# Patient Record
Sex: FEMALE | Race: WHITE | Employment: OTHER | URBAN - METROPOLITAN AREA
[De-identification: names, ages, dates, MRNs, and addresses within clinical notes are randomized per-mention and may not be internally consistent; named-entity substitution may affect disease eponyms.]

---

## 2017-01-06 ENCOUNTER — OFFICE VISIT (OUTPATIENT)
Dept: SURGERY | Age: 77
End: 2017-01-06

## 2017-01-06 VITALS
HEIGHT: 62 IN | WEIGHT: 139 LBS | BODY MASS INDEX: 25.58 KG/M2 | SYSTOLIC BLOOD PRESSURE: 160 MMHG | DIASTOLIC BLOOD PRESSURE: 88 MMHG

## 2017-01-06 DIAGNOSIS — D23.72: Primary | ICD-10-CM

## 2017-01-06 PROBLEM — D23.70: Status: ACTIVE | Noted: 2017-01-06

## 2017-01-06 PROCEDURE — 99202 OFFICE O/P NEW SF 15 MIN: CPT | Performed by: SURGERY

## 2017-01-06 ASSESSMENT — ENCOUNTER SYMPTOMS
GASTROINTESTINAL NEGATIVE: 1
RESPIRATORY NEGATIVE: 1
COLOR CHANGE: 1
EYES NEGATIVE: 1
ALLERGIC/IMMUNOLOGIC NEGATIVE: 1

## 2017-01-12 ENCOUNTER — PROCEDURE VISIT (OUTPATIENT)
Dept: SURGERY | Age: 77
End: 2017-01-12

## 2017-01-12 VITALS — SYSTOLIC BLOOD PRESSURE: 122 MMHG | DIASTOLIC BLOOD PRESSURE: 68 MMHG

## 2017-01-12 DIAGNOSIS — L98.9 SKIN LESION: Primary | ICD-10-CM

## 2017-01-12 DIAGNOSIS — D22.9 NEVUS: ICD-10-CM

## 2017-01-12 PROCEDURE — 11401 EXC TR-EXT B9+MARG 0.6-1 CM: CPT | Performed by: SURGERY

## 2017-01-12 PROCEDURE — 1036F TOBACCO NON-USER: CPT | Performed by: SURGERY

## 2017-01-12 PROCEDURE — 12031 INTMD RPR S/A/T/EXT 2.5 CM/<: CPT | Performed by: SURGERY

## 2017-01-23 ENCOUNTER — OFFICE VISIT (OUTPATIENT)
Dept: SURGERY | Age: 77
End: 2017-01-23

## 2017-01-23 VITALS — DIASTOLIC BLOOD PRESSURE: 60 MMHG | SYSTOLIC BLOOD PRESSURE: 112 MMHG

## 2017-01-23 DIAGNOSIS — D22.9 NEVUS: Primary | ICD-10-CM

## 2017-01-23 PROCEDURE — 99024 POSTOP FOLLOW-UP VISIT: CPT | Performed by: SURGERY

## 2017-01-30 RX ORDER — PROPRANOLOL HYDROCHLORIDE 40 MG/1
TABLET ORAL
Qty: 180 TABLET | Refills: 1 | Status: SHIPPED | OUTPATIENT
Start: 2017-01-30 | End: 2017-07-29 | Stop reason: SDUPTHER

## 2017-02-16 ENCOUNTER — TELEPHONE (OUTPATIENT)
Dept: INTERNAL MEDICINE CLINIC | Age: 77
End: 2017-02-16

## 2017-05-01 RX ORDER — ZOLPIDEM TARTRATE 5 MG/1
TABLET ORAL
Qty: 30 TABLET | Refills: 2 | Status: SHIPPED | OUTPATIENT
Start: 2017-05-01 | End: 2017-12-04 | Stop reason: SDUPTHER

## 2017-07-11 ENCOUNTER — OFFICE VISIT (OUTPATIENT)
Dept: INTERNAL MEDICINE CLINIC | Age: 77
End: 2017-07-11

## 2017-07-11 VITALS
HEIGHT: 62 IN | WEIGHT: 136.6 LBS | BODY MASS INDEX: 25.14 KG/M2 | SYSTOLIC BLOOD PRESSURE: 130 MMHG | HEART RATE: 60 BPM | DIASTOLIC BLOOD PRESSURE: 68 MMHG

## 2017-07-11 DIAGNOSIS — G47.62 NOCTURNAL LEG CRAMPS: ICD-10-CM

## 2017-07-11 DIAGNOSIS — E78.2 MIXED HYPERLIPIDEMIA: ICD-10-CM

## 2017-07-11 DIAGNOSIS — I10 ESSENTIAL HYPERTENSION: Primary | ICD-10-CM

## 2017-07-11 LAB
HCT VFR BLD CALC: 37 % (ref 36–48)
HEMOGLOBIN: 12.6 G/DL (ref 12–16)
MCH RBC QN AUTO: 32.8 PG (ref 26–34)
MCHC RBC AUTO-ENTMCNC: 34.1 G/DL (ref 31–36)
MCV RBC AUTO: 96.3 FL (ref 80–100)
PDW BLD-RTO: 14.7 % (ref 12.4–15.4)
PLATELET # BLD: 270 K/UL (ref 135–450)
PMV BLD AUTO: 9.9 FL (ref 5–10.5)
RBC # BLD: 3.84 M/UL (ref 4–5.2)
WBC # BLD: 7.8 K/UL (ref 4–11)

## 2017-07-11 PROCEDURE — 1036F TOBACCO NON-USER: CPT | Performed by: INTERNAL MEDICINE

## 2017-07-11 PROCEDURE — 4040F PNEUMOC VAC/ADMIN/RCVD: CPT | Performed by: INTERNAL MEDICINE

## 2017-07-11 PROCEDURE — G8427 DOCREV CUR MEDS BY ELIG CLIN: HCPCS | Performed by: INTERNAL MEDICINE

## 2017-07-11 PROCEDURE — G8399 PT W/DXA RESULTS DOCUMENT: HCPCS | Performed by: INTERNAL MEDICINE

## 2017-07-11 PROCEDURE — 1090F PRES/ABSN URINE INCON ASSESS: CPT | Performed by: INTERNAL MEDICINE

## 2017-07-11 PROCEDURE — G8420 CALC BMI NORM PARAMETERS: HCPCS | Performed by: INTERNAL MEDICINE

## 2017-07-11 PROCEDURE — 1123F ACP DISCUSS/DSCN MKR DOCD: CPT | Performed by: INTERNAL MEDICINE

## 2017-07-11 PROCEDURE — 99214 OFFICE O/P EST MOD 30 MIN: CPT | Performed by: INTERNAL MEDICINE

## 2017-07-12 LAB
ALBUMIN SERPL-MCNC: 3.9 G/DL (ref 3.4–5)
ANION GAP SERPL CALCULATED.3IONS-SCNC: 15 MMOL/L (ref 3–16)
BUN BLDV-MCNC: 19 MG/DL (ref 7–20)
CALCIUM SERPL-MCNC: 9.7 MG/DL (ref 8.3–10.6)
CHLORIDE BLD-SCNC: 96 MMOL/L (ref 99–110)
CHOLESTEROL, TOTAL: 168 MG/DL (ref 0–199)
CO2: 25 MMOL/L (ref 21–32)
CREAT SERPL-MCNC: 0.8 MG/DL (ref 0.6–1.2)
GFR AFRICAN AMERICAN: >60
GFR NON-AFRICAN AMERICAN: >60
GLUCOSE BLD-MCNC: 96 MG/DL (ref 70–99)
HDLC SERPL-MCNC: 31 MG/DL (ref 40–60)
LDL CHOLESTEROL CALCULATED: 83 MG/DL
PHOSPHORUS: 4.2 MG/DL (ref 2.5–4.9)
POTASSIUM SERPL-SCNC: 4.6 MMOL/L (ref 3.5–5.1)
SODIUM BLD-SCNC: 136 MMOL/L (ref 136–145)
TRIGL SERPL-MCNC: 272 MG/DL (ref 0–150)
TSH REFLEX FT4: 1.8 UIU/ML (ref 0.27–4.2)
VLDLC SERPL CALC-MCNC: 54 MG/DL

## 2017-07-31 RX ORDER — PROPRANOLOL HYDROCHLORIDE 40 MG/1
TABLET ORAL
Qty: 180 TABLET | Refills: 1 | Status: SHIPPED | OUTPATIENT
Start: 2017-07-31 | End: 2018-01-27 | Stop reason: SDUPTHER

## 2017-09-15 ENCOUNTER — TELEPHONE (OUTPATIENT)
Dept: INTERNAL MEDICINE CLINIC | Age: 77
End: 2017-09-15

## 2017-09-15 ENCOUNTER — OFFICE VISIT (OUTPATIENT)
Dept: INTERNAL MEDICINE CLINIC | Age: 77
End: 2017-09-15

## 2017-09-15 ENCOUNTER — HOSPITAL ENCOUNTER (OUTPATIENT)
Dept: OTHER | Age: 77
Discharge: HOME | End: 2017-09-15
Attending: INTERNAL MEDICINE | Admitting: INTERNAL MEDICINE

## 2017-09-15 VITALS
TEMPERATURE: 98.4 F | OXYGEN SATURATION: 94 % | BODY MASS INDEX: 24.11 KG/M2 | HEIGHT: 62 IN | HEART RATE: 60 BPM | SYSTOLIC BLOOD PRESSURE: 130 MMHG | DIASTOLIC BLOOD PRESSURE: 70 MMHG | WEIGHT: 131 LBS

## 2017-09-15 DIAGNOSIS — J18.9 CAP (COMMUNITY ACQUIRED PNEUMONIA): Primary | ICD-10-CM

## 2017-09-15 DIAGNOSIS — R05.9 COUGH: ICD-10-CM

## 2017-09-15 PROCEDURE — 1090F PRES/ABSN URINE INCON ASSESS: CPT | Performed by: INTERNAL MEDICINE

## 2017-09-15 PROCEDURE — G8427 DOCREV CUR MEDS BY ELIG CLIN: HCPCS | Performed by: INTERNAL MEDICINE

## 2017-09-15 PROCEDURE — 1036F TOBACCO NON-USER: CPT | Performed by: INTERNAL MEDICINE

## 2017-09-15 PROCEDURE — 4040F PNEUMOC VAC/ADMIN/RCVD: CPT | Performed by: INTERNAL MEDICINE

## 2017-09-15 PROCEDURE — 99213 OFFICE O/P EST LOW 20 MIN: CPT | Performed by: INTERNAL MEDICINE

## 2017-09-15 PROCEDURE — G8420 CALC BMI NORM PARAMETERS: HCPCS | Performed by: INTERNAL MEDICINE

## 2017-09-15 PROCEDURE — 1123F ACP DISCUSS/DSCN MKR DOCD: CPT | Performed by: INTERNAL MEDICINE

## 2017-09-15 PROCEDURE — G8399 PT W/DXA RESULTS DOCUMENT: HCPCS | Performed by: INTERNAL MEDICINE

## 2017-09-15 RX ORDER — DOXYCYCLINE HYCLATE 100 MG
100 TABLET ORAL 2 TIMES DAILY
Qty: 14 TABLET | Refills: 0 | Status: SHIPPED | OUTPATIENT
Start: 2017-09-15 | End: 2017-09-22

## 2017-09-15 RX ORDER — PREDNISONE 20 MG/1
40 TABLET ORAL DAILY
Qty: 10 TABLET | Refills: 0 | Status: SHIPPED | OUTPATIENT
Start: 2017-09-15 | End: 2017-09-20

## 2017-10-16 ENCOUNTER — OFFICE VISIT (OUTPATIENT)
Dept: INTERNAL MEDICINE CLINIC | Age: 77
End: 2017-10-16

## 2017-10-16 VITALS
DIASTOLIC BLOOD PRESSURE: 76 MMHG | HEIGHT: 62 IN | BODY MASS INDEX: 24.59 KG/M2 | WEIGHT: 133.6 LBS | HEART RATE: 56 BPM | SYSTOLIC BLOOD PRESSURE: 126 MMHG

## 2017-10-16 DIAGNOSIS — R40.20 LOSS OF CONSCIOUSNESS (HCC): Primary | ICD-10-CM

## 2017-10-16 PROCEDURE — G8484 FLU IMMUNIZE NO ADMIN: HCPCS | Performed by: INTERNAL MEDICINE

## 2017-10-16 PROCEDURE — 1123F ACP DISCUSS/DSCN MKR DOCD: CPT | Performed by: INTERNAL MEDICINE

## 2017-10-16 PROCEDURE — G8399 PT W/DXA RESULTS DOCUMENT: HCPCS | Performed by: INTERNAL MEDICINE

## 2017-10-16 PROCEDURE — G8427 DOCREV CUR MEDS BY ELIG CLIN: HCPCS | Performed by: INTERNAL MEDICINE

## 2017-10-16 PROCEDURE — G8420 CALC BMI NORM PARAMETERS: HCPCS | Performed by: INTERNAL MEDICINE

## 2017-10-16 PROCEDURE — 99214 OFFICE O/P EST MOD 30 MIN: CPT | Performed by: INTERNAL MEDICINE

## 2017-10-16 PROCEDURE — 4040F PNEUMOC VAC/ADMIN/RCVD: CPT | Performed by: INTERNAL MEDICINE

## 2017-10-16 PROCEDURE — 1036F TOBACCO NON-USER: CPT | Performed by: INTERNAL MEDICINE

## 2017-10-16 PROCEDURE — 1090F PRES/ABSN URINE INCON ASSESS: CPT | Performed by: INTERNAL MEDICINE

## 2017-10-16 PROCEDURE — 93000 ELECTROCARDIOGRAM COMPLETE: CPT | Performed by: INTERNAL MEDICINE

## 2017-10-16 RX ORDER — SIMVASTATIN 20 MG
TABLET ORAL
Qty: 90 TABLET | Refills: 3 | Status: SHIPPED | OUTPATIENT
Start: 2017-10-16 | End: 2018-10-11 | Stop reason: SDUPTHER

## 2017-10-16 RX ORDER — LISINOPRIL AND HYDROCHLOROTHIAZIDE 25; 20 MG/1; MG/1
TABLET ORAL
Qty: 90 TABLET | Refills: 3 | Status: SHIPPED | OUTPATIENT
Start: 2017-10-16 | End: 2018-10-22 | Stop reason: SDUPTHER

## 2017-10-16 NOTE — PROGRESS NOTES
Subjective:      Patient ID: Wilfred Barney is a 65 y.o. female. CC: loss of consciousness  HPI  The patient is presenting for evaluation for an episode of syncope. Onset: Saturday  Location: head  She was standing still when her symptoms occurred. She had been standing for at least several minutes. She had a prodrome where she felt funny. Associated symptoms: diaphoresis  She was unconscious for 5-10 seconds. She was immediately oriented after the event. She refused evaluation in the ER following the event. She had a prior episode of syncope about 3 years ago. Past Medical History:   Diagnosis Date    Asthma     Breast cyst 1983    Essential tremor     Hyperlipidemia     Ovarian cyst 1979    Pneumonia 2000    Stroke [facility])     mini      Review of Systems  She had incontinence of urine with the event  Neg for nausea or vomiting  Neg for chest pain or palpitations    Objective:   Physical Exam  /76 (Site: Left Arm, Position: Sitting, Cuff Size: Small Adult)   Pulse 56   Ht 5' 2\" (1.575 m)   Wt 133 lb 9.6 oz (60.6 kg)   BMI 24.44 kg/m²    Gen: WN/WD, no acute distress  Head: normocephalic, atraumatic  Eyes: no icterus, no conjunctival erythema. Pupils reactive to light. ENT: Moist mucous membranes, normal nose, the oropharynx is pink and moist  Neck: supple, no masses, no carotid bruits  CV: regular rate and rhythm, no murmurs, rubs, gallops. Peripheral pulses are 2+, symmetric  Resp: Normal effort, clear to auscultation bilaterally  Abd: +Bowel Sounds, soft, non tender to palpation. MSK: no joint swelling, no cyanosis or clubbing  Skin: warm, dry, no rashes visualized  Neuro: Cranial Nerves intact, sensation intact to light touch in all extremities, 5 out of 5 motor function in all extremities, there is no rigidity, there is no dysmetria with finger-nose-finger testing of the bilateral upper extremities  Psych: normal mood and affect. Appropriately oriented.      EKG obtained in the office and interpreted by me reveals NSR, rate 56 bpm, normal intervals, axis, t wave morphology, no ST segment deviation. Assessment:      1. Loss of consciousness (HCC)  EKG 12 Lead    Diff dx: orthostatic syncope, Arrhythmogenic syncope, vasovagal syncope. She thinks she may have been dehydrated that day which would favor orthostatic syncope. Holter monitor will be obtained to eval for arrhythmia. Encouraged fluid intake and calf exercises prior to standing. Plan:      See above.

## 2017-10-17 ENCOUNTER — HOSPITAL ENCOUNTER (OUTPATIENT)
Dept: NEUROLOGY | Age: 77
Discharge: HOME | End: 2017-10-17
Attending: INTERNAL MEDICINE | Admitting: INTERNAL MEDICINE

## 2017-10-17 DIAGNOSIS — R40.20 COMA (HCC): ICD-10-CM

## 2017-10-20 LAB
ACQUISITION DURATION: NORMAL S
AVERAGE HEART RATE: 58 BPM
EKG DIAGNOSIS: NORMAL
FASTEST SUPRAVENTRICULAR RATE: 113 BPM
HOLTER MAX HEART RATE: 85 BPM
HOOKUP DATE: NORMAL
HOOKUP TIME: NORMAL
LONGEST SUPRAVENTRICULAR RATE: 91 BPM
Lab: NORMAL
MAX HEART RATE TIME/DATE: NORMAL
MIN HEART RATE TIME/DATE: NORMAL
MIN HEART RATE: 48 BPM
NUMBER OF FASTEST SUPRAVENTRICULAR BEATS: 3
NUMBER OF LONGEST SUPRAVENTRICULAR BEATS: 23
NUMBER OF QRS COMPLEXES: NORMAL
NUMBER OF SUPRAVENTRICULAR BEATS IN RUNS: 35
NUMBER OF SUPRAVENTRICULAR COUPLETS: 9
NUMBER OF SUPRAVENTRICULAR ECTOPICS: 673
NUMBER OF SUPRAVENTRICULAR ISOLATED BEATS: 619
NUMBER OF SUPRAVENTRICULAR RUNS: 4
NUMBER OF VENTRICULAR BEATS IN RUNS: 0
NUMBER OF VENTRICULAR BIGEMINAL CYCLES: 0
NUMBER OF VENTRICULAR COUPLETS: 0
NUMBER OF VENTRICULAR ECTOPICS: 4
NUMBER OF VENTRICULAR ISOLATED BEATS: 4
NUMBER OF VENTRICULAR RUNS: 0

## 2018-01-05 ENCOUNTER — TELEPHONE (OUTPATIENT)
Dept: INTERNAL MEDICINE CLINIC | Age: 78
End: 2018-01-05

## 2018-01-05 RX ORDER — MV-MN/OM3/DHA/EPA/FISH/LUT/ZEA 250-5-1 MG
1 CAPSULE ORAL DAILY
Status: ON HOLD | COMMUNITY
End: 2020-08-12

## 2018-01-05 NOTE — TELEPHONE ENCOUNTER
Dr. Patricia Reyes: This patient has an upcoming appointment with you for Hyperlipidemia. In planning for that visit I have completed the following pre-visit planning:     Pre-Visit Planning Checklist:  Patient contacted: yes  Verified patient by name and date of birth: yes    Health Maintenance items reviewed:    No pre-visit planning health maintenance topics to review at this time    Labs and procedures pended:     Labs and procedures discussed with patient:   Reminded patient to check with their insurance company about coverage for lab tests and lab location:     Preliminary Medication Reconciliation: was performed. Reminded patient to bring medications to appointment: no    Reminded patient to arrive early: yes    Other notes:     Please complete the med-reconciliation and sign the appropriate labs as soon as possible.       Juve Nevarez  Pre-Services Specialist

## 2018-01-11 ENCOUNTER — OFFICE VISIT (OUTPATIENT)
Dept: INTERNAL MEDICINE CLINIC | Age: 78
End: 2018-01-11

## 2018-01-11 VITALS
HEART RATE: 64 BPM | DIASTOLIC BLOOD PRESSURE: 80 MMHG | WEIGHT: 134.6 LBS | HEIGHT: 62 IN | BODY MASS INDEX: 24.77 KG/M2 | SYSTOLIC BLOOD PRESSURE: 130 MMHG

## 2018-01-11 DIAGNOSIS — R82.90 URINE MALODOR: ICD-10-CM

## 2018-01-11 DIAGNOSIS — F51.01 PRIMARY INSOMNIA: ICD-10-CM

## 2018-01-11 DIAGNOSIS — Z23 NEED FOR INFLUENZA VACCINATION: ICD-10-CM

## 2018-01-11 DIAGNOSIS — E78.2 MIXED HYPERLIPIDEMIA: ICD-10-CM

## 2018-01-11 DIAGNOSIS — I10 ESSENTIAL HYPERTENSION: Primary | ICD-10-CM

## 2018-01-11 PROCEDURE — 90662 IIV NO PRSV INCREASED AG IM: CPT | Performed by: INTERNAL MEDICINE

## 2018-01-11 PROCEDURE — 99214 OFFICE O/P EST MOD 30 MIN: CPT | Performed by: INTERNAL MEDICINE

## 2018-01-11 PROCEDURE — G0008 ADMIN INFLUENZA VIRUS VAC: HCPCS | Performed by: INTERNAL MEDICINE

## 2018-01-11 PROCEDURE — 81002 URINALYSIS NONAUTO W/O SCOPE: CPT | Performed by: INTERNAL MEDICINE

## 2018-01-11 NOTE — PROGRESS NOTES
Chief Complaint   Patient presents with    Hypertension    Hyperlipidemia    Immunizations     would like to discuss influenza vaccine    Other     noticed her urine has an odor to it, no pressure, no burning or pain       HPI:  Shyanne Koch is a 65 y.o. (: 1940) here today for     Routine follow up for hypertension. Feels medications are working well. She does not routinely monitor her blood pressure at home. Routine follow up hyperlipidemia. Feels medication is working well without side effects. Denies myalgia. Chronic Insomnia. Zolpidem 2.5 - 5 mg most nights, provides relief. She feels it is working well without side effects. Patient reports malodor with urination for several months. She denies dysuria, urinary frequency or urgency. Social History   Substance Use Topics    Smoking status: Former Smoker     Packs/day: 1.00     Types: Cigarettes     Quit date: 2013    Smokeless tobacco: Never Used    Alcohol use Yes      Comment: very rare sometimes a glass of wine        ROS:  CV: Neg for chest pain  RESP: neg for dyspnea   GI: Neg for constipation or diarrhea  ENT: + Post nasal drainage for about 1 week, relieved with Advil sinus      OBJECTIVE:    /80   Pulse 64   Ht 5' 2\" (1.575 m)   Wt 134 lb 9.6 oz (61.1 kg)   BMI 24.62 kg/m²   BP Readings from Last 2 Encounters:   18 130/80   10/16/17 126/76     Wt Readings from Last 3 Encounters:   18 134 lb 9.6 oz (61.1 kg)   10/16/17 133 lb 9.6 oz (60.6 kg)   09/15/17 131 lb (59.4 kg)       GEN: WN/WD, NAD  CV: regular rate and rhythm, no murmurs rubs or gallops  Resp: normal effort, clear auscultation bilaterally  No peripheral edema   Abd: soft, nontender to palpation.   ENT: +erythema and drainage of the posterior OP  Neck: supple with no lymph nodes      Lab Results   Component Value Date    CREATININE 0.8 2017    BUN 19 2017     2017    K 4.6 2017    CL 96 (L) 2017    CO2 25

## 2018-01-29 RX ORDER — PROPRANOLOL HYDROCHLORIDE 40 MG/1
TABLET ORAL
Qty: 180 TABLET | Refills: 1 | Status: SHIPPED | OUTPATIENT
Start: 2018-01-29 | End: 2018-07-26 | Stop reason: SDUPTHER

## 2018-02-06 LAB
BILIRUBIN, POC: NORMAL
BLOOD URINE, POC: NORMAL
CLARITY, POC: NORMAL
COLOR, POC: NORMAL
GLUCOSE URINE, POC: NORMAL
KETONES, POC: NORMAL
LEUKOCYTE EST, POC: NORMAL
NITRITE, POC: POSITIVE
PH, POC: 5
PROTEIN, POC: NORMAL
SPECIFIC GRAVITY, POC: 1.02
UROBILINOGEN, POC: NORMAL

## 2018-02-06 RX ORDER — NITROFURANTOIN 25; 75 MG/1; MG/1
100 CAPSULE ORAL 2 TIMES DAILY
Qty: 10 CAPSULE | Refills: 0 | Status: SHIPPED | OUTPATIENT
Start: 2018-02-06 | End: 2018-02-11

## 2018-04-16 ENCOUNTER — TELEPHONE (OUTPATIENT)
Dept: INTERNAL MEDICINE CLINIC | Age: 78
End: 2018-04-16

## 2018-04-16 ENCOUNTER — OFFICE VISIT (OUTPATIENT)
Dept: INTERNAL MEDICINE CLINIC | Age: 78
End: 2018-04-16

## 2018-04-16 VITALS
DIASTOLIC BLOOD PRESSURE: 80 MMHG | OXYGEN SATURATION: 98 % | SYSTOLIC BLOOD PRESSURE: 160 MMHG | HEIGHT: 62 IN | BODY MASS INDEX: 24.77 KG/M2 | WEIGHT: 134.6 LBS | HEART RATE: 56 BPM

## 2018-04-16 DIAGNOSIS — R55 SYNCOPE, UNSPECIFIED SYNCOPE TYPE: Primary | ICD-10-CM

## 2018-04-16 DIAGNOSIS — R25.8 BRADYKINESIA: ICD-10-CM

## 2018-04-16 DIAGNOSIS — R25.1 TREMOR: ICD-10-CM

## 2018-04-16 PROCEDURE — 99214 OFFICE O/P EST MOD 30 MIN: CPT | Performed by: INTERNAL MEDICINE

## 2018-04-16 PROCEDURE — 93000 ELECTROCARDIOGRAM COMPLETE: CPT | Performed by: INTERNAL MEDICINE

## 2018-04-18 ENCOUNTER — OFFICE VISIT (OUTPATIENT)
Dept: NEUROLOGY | Age: 78
End: 2018-04-18

## 2018-04-18 VITALS
HEIGHT: 62 IN | BODY MASS INDEX: 24.84 KG/M2 | DIASTOLIC BLOOD PRESSURE: 87 MMHG | HEART RATE: 73 BPM | WEIGHT: 135 LBS | SYSTOLIC BLOOD PRESSURE: 167 MMHG

## 2018-04-18 DIAGNOSIS — R27.0 ATAXIA: ICD-10-CM

## 2018-04-18 DIAGNOSIS — R55 SYNCOPE AND COLLAPSE: ICD-10-CM

## 2018-04-18 DIAGNOSIS — G20 PARKINSONIAN TREMOR (HCC): Primary | ICD-10-CM

## 2018-04-18 DIAGNOSIS — R25.8 BRADYKINESIA: ICD-10-CM

## 2018-04-18 PROBLEM — G20.C PARKINSONIAN TREMOR: Status: ACTIVE | Noted: 2018-04-18

## 2018-04-18 PROCEDURE — 99215 OFFICE O/P EST HI 40 MIN: CPT | Performed by: PSYCHIATRY & NEUROLOGY

## 2018-04-26 ENCOUNTER — HOSPITAL ENCOUNTER (OUTPATIENT)
Dept: NEUROLOGY | Age: 78
Discharge: HOME | End: 2018-04-26
Admitting: PSYCHIATRY & NEUROLOGY

## 2018-04-26 DIAGNOSIS — R25.8 BRADYKINESIA: ICD-10-CM

## 2018-04-26 DIAGNOSIS — R27.0 ATAXIA: ICD-10-CM

## 2018-04-26 DIAGNOSIS — R55 SYNCOPE AND COLLAPSE: ICD-10-CM

## 2018-04-26 DIAGNOSIS — G20 PARKINSONIAN TREMOR (HCC): ICD-10-CM

## 2018-05-14 ENCOUNTER — OFFICE VISIT (OUTPATIENT)
Dept: INTERNAL MEDICINE CLINIC | Age: 78
End: 2018-05-14

## 2018-05-14 VITALS
DIASTOLIC BLOOD PRESSURE: 78 MMHG | BODY MASS INDEX: 25.03 KG/M2 | SYSTOLIC BLOOD PRESSURE: 158 MMHG | HEIGHT: 62 IN | HEART RATE: 60 BPM | WEIGHT: 136 LBS

## 2018-05-14 DIAGNOSIS — R53.82 CHRONIC FATIGUE: ICD-10-CM

## 2018-05-14 DIAGNOSIS — I10 ESSENTIAL HYPERTENSION: Primary | ICD-10-CM

## 2018-05-14 LAB
ALBUMIN SERPL-MCNC: 4.2 G/DL (ref 3.4–5)
ANION GAP SERPL CALCULATED.3IONS-SCNC: 20 MMOL/L (ref 3–16)
BUN BLDV-MCNC: 16 MG/DL (ref 7–20)
CALCIUM SERPL-MCNC: 9.2 MG/DL (ref 8.3–10.6)
CHLORIDE BLD-SCNC: 93 MMOL/L (ref 99–110)
CO2: 23 MMOL/L (ref 21–32)
CREAT SERPL-MCNC: 0.8 MG/DL (ref 0.6–1.2)
GFR AFRICAN AMERICAN: >60
GFR NON-AFRICAN AMERICAN: >60
GLUCOSE BLD-MCNC: 130 MG/DL (ref 70–99)
HCT VFR BLD CALC: 35.7 % (ref 36–48)
HEMOGLOBIN: 12.4 G/DL (ref 12–16)
MCH RBC QN AUTO: 33.4 PG (ref 26–34)
MCHC RBC AUTO-ENTMCNC: 34.7 G/DL (ref 31–36)
MCV RBC AUTO: 96.5 FL (ref 80–100)
PDW BLD-RTO: 14.6 % (ref 12.4–15.4)
PHOSPHORUS: 3.2 MG/DL (ref 2.5–4.9)
PLATELET # BLD: 252 K/UL (ref 135–450)
PMV BLD AUTO: 9.9 FL (ref 5–10.5)
POTASSIUM SERPL-SCNC: 3.9 MMOL/L (ref 3.5–5.1)
RBC # BLD: 3.69 M/UL (ref 4–5.2)
SODIUM BLD-SCNC: 136 MMOL/L (ref 136–145)
TSH SERPL DL<=0.05 MIU/L-ACNC: 1.82 UIU/ML (ref 0.27–4.2)
VITAMIN B-12: 269 PG/ML (ref 211–911)
WBC # BLD: 9 K/UL (ref 4–11)

## 2018-05-14 PROCEDURE — 99213 OFFICE O/P EST LOW 20 MIN: CPT | Performed by: INTERNAL MEDICINE

## 2018-05-14 RX ORDER — CETIRIZINE HYDROCHLORIDE 10 MG/1
10 TABLET ORAL DAILY
COMMUNITY
End: 2021-04-19

## 2018-05-14 RX ORDER — AMLODIPINE BESYLATE 5 MG/1
5 TABLET ORAL DAILY
Qty: 30 TABLET | Refills: 5 | Status: SHIPPED | OUTPATIENT
Start: 2018-05-14 | End: 2018-11-07 | Stop reason: SDUPTHER

## 2018-05-18 ENCOUNTER — OFFICE VISIT (OUTPATIENT)
Dept: NEUROLOGY | Age: 78
End: 2018-05-18

## 2018-05-18 VITALS
DIASTOLIC BLOOD PRESSURE: 72 MMHG | BODY MASS INDEX: 24.84 KG/M2 | HEIGHT: 62 IN | HEART RATE: 54 BPM | SYSTOLIC BLOOD PRESSURE: 145 MMHG | WEIGHT: 135 LBS

## 2018-05-18 DIAGNOSIS — G20 PARKINSONIAN TREMOR (HCC): ICD-10-CM

## 2018-05-18 DIAGNOSIS — R55 SYNCOPE AND COLLAPSE: Primary | ICD-10-CM

## 2018-05-18 PROCEDURE — 99213 OFFICE O/P EST LOW 20 MIN: CPT | Performed by: PSYCHIATRY & NEUROLOGY

## 2018-05-18 RX ORDER — LANOLIN ALCOHOL/MO/W.PET/CERES
1000 CREAM (GRAM) TOPICAL DAILY
COMMUNITY
End: 2021-04-19

## 2018-07-19 ENCOUNTER — OFFICE VISIT (OUTPATIENT)
Dept: INTERNAL MEDICINE CLINIC | Age: 78
End: 2018-07-19

## 2018-07-19 VITALS
HEART RATE: 56 BPM | HEIGHT: 62 IN | WEIGHT: 133.8 LBS | DIASTOLIC BLOOD PRESSURE: 70 MMHG | BODY MASS INDEX: 24.62 KG/M2 | SYSTOLIC BLOOD PRESSURE: 136 MMHG

## 2018-07-19 DIAGNOSIS — I10 ESSENTIAL HYPERTENSION: Primary | ICD-10-CM

## 2018-07-19 DIAGNOSIS — R73.09 ABNORMAL GLUCOSE: ICD-10-CM

## 2018-07-19 DIAGNOSIS — E53.8 VITAMIN B12 DEFICIENCY: ICD-10-CM

## 2018-07-19 DIAGNOSIS — E78.2 MIXED HYPERLIPIDEMIA: ICD-10-CM

## 2018-07-19 DIAGNOSIS — F51.01 PRIMARY INSOMNIA: ICD-10-CM

## 2018-07-19 LAB
CHOLESTEROL, TOTAL: 170 MG/DL (ref 0–199)
HDLC SERPL-MCNC: 37 MG/DL (ref 40–60)
LDL CHOLESTEROL CALCULATED: 99 MG/DL
TRIGL SERPL-MCNC: 170 MG/DL (ref 0–150)
VITAMIN B-12: 955 PG/ML (ref 211–911)
VLDLC SERPL CALC-MCNC: 34 MG/DL

## 2018-07-19 PROCEDURE — G8510 SCR DEP NEG, NO PLAN REQD: HCPCS | Performed by: INTERNAL MEDICINE

## 2018-07-19 PROCEDURE — 99214 OFFICE O/P EST MOD 30 MIN: CPT | Performed by: INTERNAL MEDICINE

## 2018-07-19 PROCEDURE — 3288F FALL RISK ASSESSMENT DOCD: CPT | Performed by: INTERNAL MEDICINE

## 2018-07-19 RX ORDER — ALBUTEROL SULFATE 90 UG/1
2 AEROSOL, METERED RESPIRATORY (INHALATION) EVERY 6 HOURS PRN
Qty: 1 INHALER | Refills: 1 | Status: SHIPPED | OUTPATIENT
Start: 2018-07-19 | End: 2019-11-12 | Stop reason: SDUPTHER

## 2018-07-19 ASSESSMENT — PATIENT HEALTH QUESTIONNAIRE - PHQ9
SUM OF ALL RESPONSES TO PHQ QUESTIONS 1-9: 0
SUM OF ALL RESPONSES TO PHQ9 QUESTIONS 1 & 2: 0
1. LITTLE INTEREST OR PLEASURE IN DOING THINGS: 0
2. FEELING DOWN, DEPRESSED OR HOPELESS: 0

## 2018-07-19 NOTE — PROGRESS NOTES
Chief Complaint   Patient presents with    Hypertension    Hyperlipidemia    Insomnia       HPI:  Andrae Mcginnis is a 65 y.o. (: 1940) here today   for management of hypertension, hyperlipidemia, and Insomnia. HTN: Taking Lisinopril and Amlodipine daily as directed. Does not check blood pressure outside the office. She reports she is noticing swelling in ankles and feet more in the evening. The swelling is better in the mornings. Reports no concerns with medications    HLD: She is taking and tolerating Simvastatin daily as directed. She denies any myalgia's. Insomnia: Chronic. Taking Zolpidem nightly which provides relief of her symptoms. She feels this works well with no side effects. PFSH: Smokes about 2/3 of a pack of cigs daily    She is taking oral B12 daily. Social History   Substance Use Topics    Smoking status: Current Every Day Smoker     Packs/day: 1.00     Types: Cigarettes     Last attempt to quit: 2013    Smokeless tobacco: Never Used    Alcohol use Yes      Comment: very rare sometimes a glass of wine        ROS:  CV: Neg for chest pain  RESP: neg for dyspnea   GI: + for constipation   Neg for diarrhea  : Neg for urinary problems  Denies any syncopal episodes     OBJECTIVE:    Ht 5' 2\" (1.575 m)   Wt 133 lb 12.8 oz (60.7 kg)   BMI 24.47 kg/m²   BP Readings from Last 2 Encounters:   18 (!) 145/72   18 (!) 158/78     Wt Readings from Last 3 Encounters:   18 133 lb 12.8 oz (60.7 kg)   18 135 lb (61.2 kg)   18 136 lb (61.7 kg)       GEN: WN/WD, NAD  CV: regular rate and rhythm, no murmurs rubs or gallops  Resp: normal effort, clear auscultation bilaterally  No peripheral edema   Abd: soft, nontender to palpation.      Lab Results   Component Value Date    CREATININE 0.8 2018    BUN 16 2018     2018    K 3.9 2018    CL 93 (L) 2018    CO2 23 2018      Lab Results   Component Value Date    TSHFT4 1.80 07/11/2017    TSH 1.82 05/14/2018      No results found for: LABA1C  No results found for: EAG   Lab Results   Component Value Date    CHOL 168 07/11/2017    CHOL 186 06/28/2016    CHOL 165 06/09/2015     Lab Results   Component Value Date    TRIG 272 (H) 07/11/2017    TRIG 198 (H) 06/28/2016    TRIG 208 (H) 06/09/2015     Lab Results   Component Value Date    HDL 31 (L) 07/11/2017    HDL 35 (L) 06/28/2016    HDL 34 (L) 06/09/2015     Lab Results   Component Value Date    LDLCALC 83 07/11/2017    LDLCALC 111 (H) 06/28/2016    LDLCALC 89 06/09/2015     Lab Results   Component Value Date    LABVLDL 54 07/11/2017    LABVLDL 40 06/28/2016    LABVLDL 42 06/09/2015     No results found for: CHOLHDLRATIO     Recommended Shingrix vaccine. ASSESSMENT/PLAN:    1. Essential hypertension  Blood pressure under good control. Continue current medications    2. Mixed hyperlipidemia  Lipids well controlled   Continue Simvastatin  Lipid panel due today     3. Primary insomnia  Tolerating Zolpidem well and receiving effective relief. Continue medication     4. Abnormal glucose:  Based no last blood test.  A1C today    5. VIt B12 deficiency:  On oral b12. Will recheck level today    6. Discussed bowel regimen for chronic constipation     RTO in 6 months       Scribe attestation: I, Debbie Haney MA, am scribing for and in the presence of Neel Shirley MD. Electronically signed by Debbie Haney MA on 7/19/2018 at 9:20 AM      Provider attestation: Raj Walters MD, personally performed the services scribed by the user listed above in my presence, and it is both accurate and complete. I agree with the Chief Complaint, ROS and Past Histories independently gathered by the clinical support staff and the remaining scribed note accurately describes my personal service to the patient.     Deirdre Sarkar MD   7/19/18   9:43 AM

## 2018-07-20 LAB
ESTIMATED AVERAGE GLUCOSE: 128.4 MG/DL
HBA1C MFR BLD: 6.1 %

## 2018-07-26 RX ORDER — PROPRANOLOL HYDROCHLORIDE 40 MG/1
TABLET ORAL
Qty: 180 TABLET | Refills: 1 | Status: SHIPPED | OUTPATIENT
Start: 2018-07-26 | End: 2019-01-22 | Stop reason: SDUPTHER

## 2018-09-19 DIAGNOSIS — F51.01 PRIMARY INSOMNIA: Primary | ICD-10-CM

## 2018-09-20 RX ORDER — ZOLPIDEM TARTRATE 5 MG/1
5 TABLET ORAL NIGHTLY PRN
Qty: 30 TABLET | Refills: 2 | Status: SHIPPED | OUTPATIENT
Start: 2018-09-20 | End: 2018-12-19 | Stop reason: SDUPTHER

## 2018-10-11 RX ORDER — SIMVASTATIN 20 MG
TABLET ORAL
Qty: 90 TABLET | Refills: 3 | Status: SHIPPED | OUTPATIENT
Start: 2018-10-11 | End: 2019-10-07 | Stop reason: SDUPTHER

## 2018-10-22 RX ORDER — LISINOPRIL AND HYDROCHLOROTHIAZIDE 25; 20 MG/1; MG/1
TABLET ORAL
Qty: 90 TABLET | Refills: 3 | Status: SHIPPED | OUTPATIENT
Start: 2018-10-22 | End: 2019-01-16

## 2018-11-07 RX ORDER — AMLODIPINE BESYLATE 5 MG/1
TABLET ORAL
Qty: 30 TABLET | Refills: 5 | Status: SHIPPED | OUTPATIENT
Start: 2018-11-07 | End: 2019-04-25 | Stop reason: SDUPTHER

## 2018-11-16 ENCOUNTER — OFFICE VISIT (OUTPATIENT)
Dept: NEUROLOGY | Age: 78
End: 2018-11-16
Payer: MEDICARE

## 2018-11-16 VITALS
HEIGHT: 62 IN | WEIGHT: 133 LBS | BODY MASS INDEX: 24.48 KG/M2 | DIASTOLIC BLOOD PRESSURE: 67 MMHG | SYSTOLIC BLOOD PRESSURE: 146 MMHG | HEART RATE: 58 BPM

## 2018-11-16 DIAGNOSIS — G25.0 ESSENTIAL TREMOR: Primary | ICD-10-CM

## 2018-11-16 PROCEDURE — 99213 OFFICE O/P EST LOW 20 MIN: CPT | Performed by: PSYCHIATRY & NEUROLOGY

## 2018-11-16 NOTE — PROGRESS NOTES
Shasha Merritt   Neurology followup    Subjective:   CC/HP  History was obtained from the patient and her son-in-law. Patient continues to have tremors in her hands. These get worse when she is under stress. There is no family history of similar tremors. No further episodes of syncope or TIA. REVIEW OF SYSTEMS    Constitutional:  []   Chills   [x]  Fatigue   []  Fevers   []  Malaise   []  Weight loss     [] Denies all of the above    Respiratory:   []  Cough    []  Shortness of breath         [x] Denies all of the above     Cardiovascular:   []  Chest pain    []  Exertional chest pressure/discomfort           [] Palpitations    []  Syncope     [x] Denies all of the above        Past Medical History:   Diagnosis Date    Asthma     Breast cyst 1983    Essential tremor     Hyperlipidemia     Ovarian cyst 1979    Pneumonia 2000    Stroke [facility])     mini     Family History   Problem Relation Age of Onset    Heart Disease Mother     Stroke Mother     Heart Disease Father     High Blood Pressure Father     Mental Illness Father      Social History     Social History    Marital status:      Spouse name: N/A    Number of children: N/A    Years of education: N/A     Social History Main Topics    Smoking status: Current Every Day Smoker     Packs/day: 0.75     Years: 2.00     Types: Cigarettes     Last attempt to quit: 4/5/2013    Smokeless tobacco: Never Used    Alcohol use Yes      Comment: very rare sometimes a glass of wine    Drug use: No    Sexual activity: Yes     Partners: Male      Comment:      Other Topics Concern    None     Social History Narrative    None        Objective:  Exam:  BP (!) 146/67   Pulse 58   Ht 5' 2\" (1.575 m)   Wt 133 lb (60.3 kg)   BMI 24.33 kg/m²   This is a well-nourished patient in no acute distress  Patient is awake, alert and oriented x3. Speech is normal.  Pupils are equal round reacting to light. Extraocular movements intact.  Face

## 2018-12-26 ENCOUNTER — TELEPHONE (OUTPATIENT)
Dept: INTERNAL MEDICINE CLINIC | Age: 78
End: 2018-12-26

## 2018-12-26 NOTE — TELEPHONE ENCOUNTER
Called and spoke to patients daughter. Did recommend ER if not feeling well. She would like the message to be held for Dr. Iesha Rhodes to review tomorrow.

## 2018-12-28 NOTE — TELEPHONE ENCOUNTER
Pt called questioning if it will be OK for her to be alone now.   Please give her a call back at [phone]

## 2018-12-28 NOTE — TELEPHONE ENCOUNTER
She reports she was sitting in the chair eating and she had not felt this before. Her stomach began hurting while she was eating and family tried to get her to the couch to lay her down. Negative for Nausea or vomiting but felt like it was in her throat ready to come up but never did. She reports her legs were not working and she passed out. Her son lowered her to the floor. BP was 120/66 P 63 a few minutes later it was 125/69 P 68. Was out a few seconds and then was taken to the couch. Just didn't feel good after the episode. [place] morning she experienced her stomach hurting again but never passed out. She has not had any the issue with her stomach hurting since katie day. Please advise what she should do and also if she is ok to go home by herself. She is currently staying at her sons home.

## 2019-01-03 ENCOUNTER — HOSPITAL ENCOUNTER (OUTPATIENT)
Age: 79
Discharge: HOME | End: 2019-01-03
Payer: MEDICARE

## 2019-01-03 ENCOUNTER — HOSPITAL ENCOUNTER (OUTPATIENT)
Dept: GENERAL RADIOLOGY | Age: 79
Discharge: HOME | End: 2019-01-03
Payer: MEDICARE

## 2019-01-03 ENCOUNTER — OFFICE VISIT (OUTPATIENT)
Dept: INTERNAL MEDICINE CLINIC | Age: 79
End: 2019-01-03
Payer: MEDICARE

## 2019-01-03 VITALS
HEIGHT: 62 IN | WEIGHT: 132 LBS | DIASTOLIC BLOOD PRESSURE: 56 MMHG | BODY MASS INDEX: 24.29 KG/M2 | HEART RATE: 64 BPM | SYSTOLIC BLOOD PRESSURE: 150 MMHG

## 2019-01-03 DIAGNOSIS — R40.20 LOSS OF CONSCIOUSNESS (HCC): ICD-10-CM

## 2019-01-03 DIAGNOSIS — G20 PARKINSON'S DISEASE (HCC): ICD-10-CM

## 2019-01-03 DIAGNOSIS — R40.20 LOSS OF CONSCIOUSNESS (HCC): Primary | ICD-10-CM

## 2019-01-03 LAB
A/G RATIO: 1.5 (ref 1.1–2.2)
ALBUMIN SERPL-MCNC: 4.3 G/DL (ref 3.4–5)
ALP BLD-CCNC: 56 U/L (ref 40–129)
ALT SERPL-CCNC: 11 U/L (ref 10–40)
ANION GAP SERPL CALCULATED.3IONS-SCNC: 15 MMOL/L (ref 3–16)
AST SERPL-CCNC: 19 U/L (ref 15–37)
BILIRUB SERPL-MCNC: <0.2 MG/DL (ref 0–1)
BUN BLDV-MCNC: 30 MG/DL (ref 7–20)
CALCIUM SERPL-MCNC: 9.6 MG/DL (ref 8.3–10.6)
CHLORIDE BLD-SCNC: 103 MMOL/L (ref 99–110)
CO2: 24 MMOL/L (ref 21–32)
CREAT SERPL-MCNC: 1 MG/DL (ref 0.6–1.2)
GFR AFRICAN AMERICAN: >60
GFR NON-AFRICAN AMERICAN: 53
GLOBULIN: 2.8 G/DL
GLUCOSE BLD-MCNC: 127 MG/DL (ref 70–99)
HCT VFR BLD CALC: 38.6 % (ref 36–48)
HEMOGLOBIN: 13.2 G/DL (ref 12–16)
MCH RBC QN AUTO: 33.6 PG (ref 26–34)
MCHC RBC AUTO-ENTMCNC: 34.3 G/DL (ref 31–36)
MCV RBC AUTO: 98.1 FL (ref 80–100)
PDW BLD-RTO: 14.2 % (ref 12.4–15.4)
PLATELET # BLD: 251 K/UL (ref 135–450)
PMV BLD AUTO: 10.3 FL (ref 5–10.5)
POTASSIUM SERPL-SCNC: 3.9 MMOL/L (ref 3.5–5.1)
RBC # BLD: 3.93 M/UL (ref 4–5.2)
SODIUM BLD-SCNC: 142 MMOL/L (ref 136–145)
TOTAL PROTEIN: 7.1 G/DL (ref 6.4–8.2)
WBC # BLD: 8.5 K/UL (ref 4–11)

## 2019-01-03 PROCEDURE — 99214 OFFICE O/P EST MOD 30 MIN: CPT | Performed by: INTERNAL MEDICINE

## 2019-01-03 PROCEDURE — 71046 X-RAY EXAM CHEST 2 VIEWS: CPT

## 2019-01-04 ENCOUNTER — TELEPHONE (OUTPATIENT)
Dept: INTERNAL MEDICINE CLINIC | Age: 79
End: 2019-01-04

## 2019-01-08 ENCOUNTER — OFFICE VISIT (OUTPATIENT)
Dept: NEUROLOGY | Age: 79
End: 2019-01-08
Payer: MEDICARE

## 2019-01-08 VITALS
BODY MASS INDEX: 24.84 KG/M2 | DIASTOLIC BLOOD PRESSURE: 59 MMHG | HEIGHT: 62 IN | SYSTOLIC BLOOD PRESSURE: 140 MMHG | HEART RATE: 60 BPM | WEIGHT: 135 LBS

## 2019-01-08 DIAGNOSIS — F41.9 ANXIETY: ICD-10-CM

## 2019-01-08 DIAGNOSIS — R55 SYNCOPE AND COLLAPSE: ICD-10-CM

## 2019-01-08 DIAGNOSIS — G25.0 BENIGN ESSENTIAL TREMOR: Primary | ICD-10-CM

## 2019-01-08 PROCEDURE — 99214 OFFICE O/P EST MOD 30 MIN: CPT | Performed by: PSYCHIATRY & NEUROLOGY

## 2019-01-15 ENCOUNTER — HOSPITAL ENCOUNTER (OUTPATIENT)
Dept: NON INVASIVE DIAGNOSTICS | Age: 79
Discharge: HOME | End: 2019-01-15
Payer: MEDICARE

## 2019-01-15 LAB
LV EF: 58 %
LVEF MODALITY: NORMAL

## 2019-01-15 PROCEDURE — 93306 TTE W/DOPPLER COMPLETE: CPT

## 2019-01-16 ENCOUNTER — OFFICE VISIT (OUTPATIENT)
Dept: CARDIOLOGY CLINIC | Age: 79
End: 2019-01-16
Payer: MEDICARE

## 2019-01-16 VITALS
HEIGHT: 62 IN | HEART RATE: 56 BPM | OXYGEN SATURATION: 94 % | SYSTOLIC BLOOD PRESSURE: 130 MMHG | DIASTOLIC BLOOD PRESSURE: 56 MMHG | BODY MASS INDEX: 25.17 KG/M2 | WEIGHT: 136.8 LBS

## 2019-01-16 DIAGNOSIS — R40.20 LOC (LOSS OF CONSCIOUSNESS) (HCC): Primary | ICD-10-CM

## 2019-01-16 DIAGNOSIS — G20 PARKINSONIAN TREMOR (HCC): ICD-10-CM

## 2019-01-16 PROCEDURE — 99204 OFFICE O/P NEW MOD 45 MIN: CPT | Performed by: INTERNAL MEDICINE

## 2019-01-16 RX ORDER — HYDROCHLOROTHIAZIDE 12.5 MG/1
12.5 TABLET ORAL DAILY PRN
Qty: 30 TABLET | Refills: 3 | Status: SHIPPED | OUTPATIENT
Start: 2019-01-16 | End: 2019-03-11 | Stop reason: SDUPTHER

## 2019-01-16 RX ORDER — LISINOPRIL 40 MG/1
40 TABLET ORAL DAILY
Qty: 30 TABLET | Refills: 5 | Status: SHIPPED | OUTPATIENT
Start: 2019-01-16 | End: 2019-06-01 | Stop reason: SDUPTHER

## 2019-01-17 ENCOUNTER — TELEPHONE (OUTPATIENT)
Dept: INTERNAL MEDICINE CLINIC | Age: 79
End: 2019-01-17

## 2019-01-21 ENCOUNTER — OFFICE VISIT (OUTPATIENT)
Dept: INTERNAL MEDICINE CLINIC | Age: 79
End: 2019-01-21
Payer: MEDICARE

## 2019-01-21 VITALS
HEIGHT: 62 IN | DIASTOLIC BLOOD PRESSURE: 70 MMHG | HEART RATE: 60 BPM | SYSTOLIC BLOOD PRESSURE: 122 MMHG | WEIGHT: 138.6 LBS | BODY MASS INDEX: 25.51 KG/M2

## 2019-01-21 DIAGNOSIS — F51.01 PRIMARY INSOMNIA: ICD-10-CM

## 2019-01-21 DIAGNOSIS — E78.2 MIXED HYPERLIPIDEMIA: ICD-10-CM

## 2019-01-21 DIAGNOSIS — F41.9 ANXIETY: ICD-10-CM

## 2019-01-21 DIAGNOSIS — I10 ESSENTIAL HYPERTENSION: Primary | ICD-10-CM

## 2019-01-21 PROBLEM — R40.20 LOC (LOSS OF CONSCIOUSNESS) (HCC): Status: RESOLVED | Noted: 2019-01-16 | Resolved: 2019-01-21

## 2019-01-21 PROBLEM — D23.70: Status: RESOLVED | Noted: 2017-01-06 | Resolved: 2019-01-21

## 2019-01-21 PROBLEM — D22.9 NEVUS: Status: RESOLVED | Noted: 2017-01-12 | Resolved: 2019-01-21

## 2019-01-21 PROCEDURE — 99214 OFFICE O/P EST MOD 30 MIN: CPT | Performed by: INTERNAL MEDICINE

## 2019-01-22 RX ORDER — PROPRANOLOL HYDROCHLORIDE 40 MG/1
TABLET ORAL
Qty: 180 TABLET | Refills: 3 | Status: SHIPPED | OUTPATIENT
Start: 2019-01-22 | End: 2020-01-17

## 2019-01-30 ENCOUNTER — TELEPHONE (OUTPATIENT)
Dept: CARDIOLOGY CLINIC | Age: 79
End: 2019-01-30

## 2019-03-01 ENCOUNTER — TELEPHONE (OUTPATIENT)
Dept: CARDIOLOGY CLINIC | Age: 79
End: 2019-03-01

## 2019-03-08 ENCOUNTER — OFFICE VISIT (OUTPATIENT)
Dept: NEUROLOGY | Age: 79
End: 2019-03-08
Payer: MEDICARE

## 2019-03-08 VITALS
WEIGHT: 139 LBS | BODY MASS INDEX: 25.58 KG/M2 | DIASTOLIC BLOOD PRESSURE: 62 MMHG | HEIGHT: 62 IN | SYSTOLIC BLOOD PRESSURE: 132 MMHG | HEART RATE: 54 BPM

## 2019-03-08 DIAGNOSIS — F51.01 PRIMARY INSOMNIA: ICD-10-CM

## 2019-03-08 DIAGNOSIS — G25.0 ESSENTIAL TREMOR: Primary | ICD-10-CM

## 2019-03-08 DIAGNOSIS — G20 PARKINSONIAN TREMOR (HCC): ICD-10-CM

## 2019-03-08 DIAGNOSIS — F32.A DEPRESSION, UNSPECIFIED DEPRESSION TYPE: ICD-10-CM

## 2019-03-08 PROCEDURE — 99214 OFFICE O/P EST MOD 30 MIN: CPT | Performed by: PSYCHIATRY & NEUROLOGY

## 2019-03-11 ENCOUNTER — OFFICE VISIT (OUTPATIENT)
Dept: CARDIOLOGY CLINIC | Age: 79
End: 2019-03-11
Payer: MEDICARE

## 2019-03-11 VITALS
WEIGHT: 141.1 LBS | BODY MASS INDEX: 25.96 KG/M2 | OXYGEN SATURATION: 96 % | HEART RATE: 58 BPM | DIASTOLIC BLOOD PRESSURE: 54 MMHG | HEIGHT: 62 IN | SYSTOLIC BLOOD PRESSURE: 120 MMHG

## 2019-03-11 DIAGNOSIS — E78.2 MIXED HYPERLIPIDEMIA: ICD-10-CM

## 2019-03-11 DIAGNOSIS — I10 ESSENTIAL HYPERTENSION: Primary | ICD-10-CM

## 2019-03-11 PROCEDURE — 99214 OFFICE O/P EST MOD 30 MIN: CPT | Performed by: INTERNAL MEDICINE

## 2019-03-11 RX ORDER — HYDROCHLOROTHIAZIDE 12.5 MG/1
12.5 TABLET ORAL DAILY PRN
Qty: 30 TABLET | Refills: 3 | Status: SHIPPED | OUTPATIENT
Start: 2019-03-11 | End: 2020-04-06 | Stop reason: SDUPTHER

## 2019-03-13 ENCOUNTER — HOSPITAL ENCOUNTER (OUTPATIENT)
Dept: GENERAL RADIOLOGY | Age: 79
Discharge: HOME | End: 2019-03-13
Payer: MEDICARE

## 2019-03-13 ENCOUNTER — HOSPITAL ENCOUNTER (OUTPATIENT)
Age: 79
Discharge: HOME | End: 2019-03-13
Payer: MEDICARE

## 2019-03-13 ENCOUNTER — OFFICE VISIT (OUTPATIENT)
Dept: INTERNAL MEDICINE CLINIC | Age: 79
End: 2019-03-13
Payer: MEDICARE

## 2019-03-13 ENCOUNTER — HOSPITAL ENCOUNTER (OUTPATIENT)
Dept: CT IMAGING | Age: 79
Discharge: HOME | End: 2019-03-13
Payer: MEDICARE

## 2019-03-13 VITALS
HEIGHT: 62 IN | WEIGHT: 141.2 LBS | HEART RATE: 68 BPM | DIASTOLIC BLOOD PRESSURE: 82 MMHG | BODY MASS INDEX: 25.98 KG/M2 | SYSTOLIC BLOOD PRESSURE: 136 MMHG

## 2019-03-13 DIAGNOSIS — R55 SYNCOPE, UNSPECIFIED SYNCOPE TYPE: Primary | ICD-10-CM

## 2019-03-13 DIAGNOSIS — R55 SYNCOPE, UNSPECIFIED SYNCOPE TYPE: ICD-10-CM

## 2019-03-13 DIAGNOSIS — M79.672 LEFT FOOT PAIN: ICD-10-CM
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
## 2019-03-13 DIAGNOSIS — R07.89 LEFT-SIDED CHEST WALL PAIN: ICD-10-CM

## 2019-03-13 PROCEDURE — 71101 X-RAY EXAM UNILAT RIBS/CHEST: CPT

## 2019-03-13 PROCEDURE — 73630 X-RAY EXAM OF FOOT: CPT

## 2019-03-13 PROCEDURE — 70450 CT HEAD/BRAIN W/O DYE: CPT

## 2019-03-13 PROCEDURE — 99214 OFFICE O/P EST MOD 30 MIN: CPT | Performed by: NURSE PRACTITIONER

## 2019-03-14 ASSESSMENT — ENCOUNTER SYMPTOMS
APNEA: 0
ABDOMINAL PAIN: 0
DIARRHEA: 0
EYE PAIN: 0
SINUS PRESSURE: 0
NAUSEA: 0
RHINORRHEA: 0
SHORTNESS OF BREATH: 0
COUGH: 0
CHEST TIGHTNESS: 0
ABDOMINAL DISTENTION: 0
CONSTIPATION: 0
EYE REDNESS: 0
BLOOD IN STOOL: 0
WHEEZING: 0
BACK PAIN: 0
VOMITING: 0

## 2019-03-18 ENCOUNTER — TELEPHONE (OUTPATIENT)
Dept: CARDIOLOGY CLINIC | Age: 79
End: 2019-03-18

## 2019-03-18 DIAGNOSIS — R55 SYNCOPE, UNSPECIFIED SYNCOPE TYPE: ICD-10-CM

## 2019-03-18 DIAGNOSIS — R40.20 LOC (LOSS OF CONSCIOUSNESS) (HCC): Primary | ICD-10-CM

## 2019-03-20 ENCOUNTER — APPOINTMENT (OUTPATIENT)
Dept: NEUROLOGY | Age: 79
End: 2019-03-20
Payer: MEDICARE

## 2019-03-20 ENCOUNTER — HOSPITAL ENCOUNTER (OUTPATIENT)
Dept: VASCULAR LAB | Age: 79
Discharge: HOME | End: 2019-03-20
Payer: MEDICARE
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
## 2019-03-20 DIAGNOSIS — R55 SYNCOPE, VASOVAGAL: Primary | ICD-10-CM

## 2019-03-20 DIAGNOSIS — R55 SYNCOPE, UNSPECIFIED SYNCOPE TYPE: ICD-10-CM

## 2019-03-20 PROCEDURE — 93880 EXTRACRANIAL BILAT STUDY: CPT

## 2019-04-22 ENCOUNTER — HOSPITAL ENCOUNTER (EMERGENCY)
Age: 79
Discharge: HOME | End: 2019-04-22
Attending: EMERGENCY MEDICINE
Payer: MEDICARE

## 2019-04-22 ENCOUNTER — APPOINTMENT (OUTPATIENT)
Dept: GENERAL RADIOLOGY | Age: 79
End: 2019-04-22
Payer: MEDICARE

## 2019-04-22 ENCOUNTER — APPOINTMENT (OUTPATIENT)
Dept: CT IMAGING | Age: 79
End: 2019-04-22
Payer: MEDICARE

## 2019-04-22 VITALS
RESPIRATION RATE: 20 BRPM | DIASTOLIC BLOOD PRESSURE: 47 MMHG | WEIGHT: 139 LBS | BODY MASS INDEX: 24.63 KG/M2 | HEIGHT: 63 IN | OXYGEN SATURATION: 93 % | TEMPERATURE: 97.7 F | HEART RATE: 64 BPM | SYSTOLIC BLOOD PRESSURE: 116 MMHG

## 2019-04-22 DIAGNOSIS — S42.021A CLOSED DISPLACED FRACTURE OF SHAFT OF RIGHT CLAVICLE, INITIAL ENCOUNTER: Primary | ICD-10-CM

## 2019-04-22 DIAGNOSIS — S22.41XA CLOSED FRACTURE OF MULTIPLE RIBS OF RIGHT SIDE, INITIAL ENCOUNTER: ICD-10-CM

## 2019-04-22 DIAGNOSIS — Z23 TETANUS TOXOID VACCINATION ADMINISTERED AT CURRENT VISIT: ICD-10-CM

## 2019-04-22 DIAGNOSIS — S09.90XA INJURY OF HEAD, INITIAL ENCOUNTER: ICD-10-CM

## 2019-04-22 DIAGNOSIS — S01.01XA LACERATION OF SCALP, INITIAL ENCOUNTER: ICD-10-CM
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
## 2019-04-22 PROCEDURE — 90715 TDAP VACCINE 7 YRS/> IM: CPT | Performed by: PHYSICIAN ASSISTANT

## 2019-04-22 PROCEDURE — 73030 X-RAY EXAM OF SHOULDER: CPT

## 2019-04-22 PROCEDURE — 90471 IMMUNIZATION ADMIN: CPT | Performed by: PHYSICIAN ASSISTANT

## 2019-04-22 PROCEDURE — 71250 CT THORAX DX C-: CPT

## 2019-04-22 PROCEDURE — 6360000002 HC RX W HCPCS: Performed by: PHYSICIAN ASSISTANT

## 2019-04-22 PROCEDURE — 96372 THER/PROPH/DIAG INJ SC/IM: CPT

## 2019-04-22 PROCEDURE — 70450 CT HEAD/BRAIN W/O DYE: CPT

## 2019-04-22 PROCEDURE — 4500000024 HC ED LEVEL 4 PROCEDURE

## 2019-04-22 PROCEDURE — 72125 CT NECK SPINE W/O DYE: CPT

## 2019-04-22 PROCEDURE — 6370000000 HC RX 637 (ALT 250 FOR IP): Performed by: PHYSICIAN ASSISTANT

## 2019-04-22 PROCEDURE — 99284 EMERGENCY DEPT VISIT MOD MDM: CPT

## 2019-04-22 RX ORDER — OXYCODONE HYDROCHLORIDE AND ACETAMINOPHEN 5; 325 MG/1; MG/1
1 TABLET ORAL ONCE
Status: COMPLETED | OUTPATIENT
Start: 2019-04-22 | End: 2019-04-22

## 2019-04-22 RX ORDER — HYDROCODONE BITARTRATE AND ACETAMINOPHEN 5; 325 MG/1; MG/1
1 TABLET ORAL ONCE
Status: COMPLETED | OUTPATIENT
Start: 2019-04-22 | End: 2019-04-22

## 2019-04-22 RX ORDER — ONDANSETRON 4 MG/1
8 TABLET, ORALLY DISINTEGRATING ORAL ONCE
Status: COMPLETED | OUTPATIENT
Start: 2019-04-22 | End: 2019-04-22

## 2019-04-22 RX ORDER — OXYCODONE HYDROCHLORIDE AND ACETAMINOPHEN 5; 325 MG/1; MG/1
.5-1 TABLET ORAL EVERY 6 HOURS PRN
Qty: 10 TABLET | Refills: 0 | Status: SHIPPED | OUTPATIENT
Start: 2019-04-22 | End: 2019-04-25

## 2019-04-22 RX ORDER — DOCUSATE SODIUM 100 MG/1
100 CAPSULE, LIQUID FILLED ORAL 2 TIMES DAILY PRN
Qty: 30 CAPSULE | Refills: 0 | Status: SHIPPED | OUTPATIENT
Start: 2019-04-22 | End: 2021-04-19

## 2019-04-22 RX ORDER — LIDOCAINE HYDROCHLORIDE AND EPINEPHRINE BITARTRATE 20; .01 MG/ML; MG/ML
INJECTION, SOLUTION SUBCUTANEOUS
Status: DISCONTINUED
Start: 2019-04-22 | End: 2019-04-22 | Stop reason: HOSPADM

## 2019-04-22 RX ADMIN — TETANUS TOXOID, REDUCED DIPHTHERIA TOXOID AND ACELLULAR PERTUSSIS VACCINE, ADSORBED 0.5 ML: 5; 2.5; 8; 8; 2.5 SUSPENSION INTRAMUSCULAR at 18:27

## 2019-04-22 RX ADMIN — HYDROCODONE BITARTRATE AND ACETAMINOPHEN 1 TABLET: 5; 325 TABLET ORAL at 18:25

## 2019-04-22 RX ADMIN — OXYCODONE HYDROCHLORIDE AND ACETAMINOPHEN 1 TABLET: 5; 325 TABLET ORAL at 19:26

## 2019-04-22 RX ADMIN — ONDANSETRON 8 MG: 4 TABLET, ORALLY DISINTEGRATING ORAL at 20:08

## 2019-04-22 ASSESSMENT — PAIN DESCRIPTION - PROGRESSION: CLINICAL_PROGRESSION: GRADUALLY IMPROVING

## 2019-04-22 ASSESSMENT — PAIN DESCRIPTION - LOCATION
LOCATION: SHOULDER
LOCATION: RIB CAGE

## 2019-04-22 ASSESSMENT — ENCOUNTER SYMPTOMS
ABDOMINAL PAIN: 0
SHORTNESS OF BREATH: 1
VOMITING: 0
BACK PAIN: 0
NAUSEA: 0

## 2019-04-22 ASSESSMENT — PAIN SCALES - GENERAL
PAINLEVEL_OUTOF10: 10
PAINLEVEL_OUTOF10: 7
PAINLEVEL_OUTOF10: 10
PAINLEVEL_OUTOF10: 10

## 2019-04-22 ASSESSMENT — PAIN DESCRIPTION - PAIN TYPE
TYPE: ACUTE PAIN
TYPE: ACUTE PAIN

## 2019-04-22 ASSESSMENT — PAIN DESCRIPTION - ORIENTATION: ORIENTATION: RIGHT

## 2019-04-22 NOTE — ED PROVIDER NOTES
with ambulation. R scalp laceration repaired with staples by BUD under my supervision, see his note for details. Tetanus updated. HCT neg otherwise. Incentive spirometry and pain control. Ortho consult, to see Dr. Catherine Mcconnell tomorrow morning and f/u with PCP for the remainder of her injuries and for staple removal.    During the patient's ED course, the patient was given:  Medications   lidocaine-EPINEPHrine 2 percent-1:014903 injection (has no administration in time range)   Tetanus-Diphth-Acell Pertussis (BOOSTRIX) injection 0.5 mL (0.5 mLs Intramuscular Given 4/22/19 1827)   HYDROcodone-acetaminophen (NORCO) 5-325 MG per tablet 1 tablet (1 tablet Oral Given 4/22/19 1825)   oxyCODONE-acetaminophen (PERCOCET) 5-325 MG per tablet 1 tablet (1 tablet Oral Given 4/22/19 1926)   ondansetron (ZOFRAN-ODT) disintegrating tablet 8 mg (8 mg Oral Given 4/22/19 2008)        CLINICAL IMPRESSION  1. Closed displaced fracture of shaft of right clavicle, initial encounter    2. Closed fracture of multiple ribs of right side, initial encounter    3. Injury of head, initial encounter    4. Laceration of scalp, initial encounter    5. Tetanus toxoid vaccination administered at current visit    6. Fall, initial encounter        Angie Sadler was discharged to home in stable condition. I have discussed the findings of today's workup with the patient and addressed the patient's questions and concerns. Important warning signs as well as new or worsening symptoms which would necessitate immediate return to the ED were discussed. The plan is to discharge from the ED at this time, and the patient is in stable condition. The patient acknowledged understanding is agreeable with this plan. Patient was given scripts for the following medications. I counseled patient how to take these medications.    Discharge Medication List as of 4/22/2019  9:11 PM      START taking these medications    Details   oxyCODONE-acetaminophen (PERCOCET) 5-325 MG per tablet Take 0.5-1 tablets by mouth every 6 hours as needed for Pain for up to 3 days. WARNING:  May cause drowsiness. May impair ability to operate vehicles or machinery. Do not use in combination with alcohol., Disp-10 tablet, R-0Print      docusate sodium (COLACE) 100 MG capsule Take 1 capsule by mouth 2 times daily as needed for Constipation, Disp-30 capsule, R-0Print           Follow-up with:  Solomon Alejandra MD  [address] [place] [address] Rd  [phone]    Schedule an appointment as soon as possible for a visit in 1 day  For re-check    Chan Saravia MD  [facility] [id number] [phone]    In 1 week  For suture removal    [facility] Emergency Department  [address] [address]  [phone]    As needed      This chart was created using Dragon dictation software. Efforts were made by me to ensure accuracy, however some errors may be present due to limitations of this technology.             Adri Hu MD  04/22/19 [id number]

## 2019-04-22 NOTE — ED PROVIDER NOTES
[address] [facility]  eMERGENCY dEPARTMENT eNCOUnter        Pt Name: Stephanie Caceres  MRN: [id number]  Caitlyngfjason 1940  Date of evaluation: 4/22/2019  Provider: Cailin Sal PA-C  PCP: Baylee Weber MD    This patient was seen and evaluated by the attending physician Dr Jeancarlos Mcmahon. CHIEF COMPLAINT       Chief Complaint   Patient presents with   Shane Drain Fall     pt brought in by family states she was at a campgrounds on a swing when she got up and lost her balance and fell.  pain to right clavicle and lac to right side  of head. Denies loc       HISTORY OF PRESENT ILLNESS   (Location/Symptom, Timing/Onset, Context/Setting, Quality, Duration, Modifying Factors, Severity)  Note limiting factors. Stephanie Caceres is a 77 y.o. female that presents to the emergency department after she got up from a swing at a campground and lost her balance falling forward approximately 6 feet down a gradual hill hitting some  stones that made a fire pit. She is right-hand dominant complaining of some right shoulder pain along with headache. She hit her head. She does not remember the fall. This was witnessed by family and there is no loss of consciousness. She has been able to ambulate since this happened. She's been acting normally. Denies numbness, abdominal pain, visual changes, nausea, vomiting. Denies any pain in her left arm or legs. States she does have some pain because into the right breast and right-sided chest that is worse when she takes a deep breath and she feels slightly short of breath with this. Does take 81 mg aspirin on a daily basis. Denies any other use of anticoagulants. Rates the pain a 10 out of 10. Denies any other symptoms. Nursing Notes were all reviewed and agreed with or any disagreements were addressed  in the HPI.     REVIEW OF SYSTEMS    (2-9 systems for level 4, 10 or more for level 5)     Review of Systems   Constitutional: Negative for chills and fever. Eyes: Negative for visual disturbance. Respiratory: Positive for shortness of breath. Cardiovascular: Positive for chest pain. Gastrointestinal: Negative for abdominal pain, nausea and vomiting. Musculoskeletal: Positive for arthralgias. Negative for back pain and neck pain. Skin: Positive for wound. Neurological: Positive for headaches. Negative for numbness. Psychiatric/Behavioral: Negative for confusion. Positives and Pertinent negatives as per HPI. Except as noted abovein the ROS, all other systems were reviewed and negative.        PAST MEDICAL HISTORY     Past Medical History:   Diagnosis Date    Asthma     Breast cyst     Essential tremor     Hyperlipidemia     Ovarian cyst     Pneumonia 2000    Stroke [facility])     mini         SURGICAL HISTORY     Past Surgical History:   Procedure Laterality Date    CATARACT REMOVAL       SECTION      HYSTERECTOMY  1981    INNER EAR SURGERY  2007         [address]       Discharge Medication List as of 2019  9:11 PM      CONTINUE these medications which have NOT CHANGED    Details   hydrochlorothiazide (HYDRODIURIL) 12.5 MG tablet Take 1 tablet by mouth daily as needed (swelling), Disp-30 tablet, R-3Normal      sertraline (ZOLOFT) 50 MG tablet TAKE 1 TABLET BY MOUTH EVERY DAY, Disp-90 tablet, R-1Normal      propranolol (INDERAL) 40 MG tablet TAKE 1 TABLET TWICE A DAY, Disp-180 tablet, R-3Normal      lisinopril (PRINIVIL;ZESTRIL) 40 MG tablet Take 1 tablet by mouth daily, Disp-30 tablet, R-5Normal      amLODIPine (NORVASC) 5 MG tablet TAKE 1 TABLET BY MOUTH EVERY DAY, Disp-30 tablet, R-5Normal      simvastatin (ZOCOR) 20 MG tablet TAKE 1 TABLET NIGHTLY, Disp-90 tablet, R-3Normal      albuterol sulfate HFA (PROVENTIL HFA) 108 (90 Base) MCG/ACT inhaler Inhale 2 puffs into the lungs every 6 hours as needed for Wheezing, Disp-1 Inhaler, R-1Normal      vitamin B-12 (CYANOCOBALAMIN) 1000 partner violence:     Fear of current or ex partner: None     Emotionally abused: None     Physically abused: None     Forced sexual activity: None   Other Topics Concern    None   Social History Narrative    None       SCREENINGS             PHYSICAL EXAM    (up to 7 for level 4, 8 or more for level 5)     ED Triage Vitals [04/22/19 1723]   BP Temp Temp Source Pulse Resp SpO2 Height Weight   (!) 134/38 97.7 °F (36.5 °C) Oral 56 20 97 % 5' 2.5\" (1.588 m) 139 lb (63 kg)       Physical Exam   Constitutional: She is oriented to person, place, and time. She appears well-developed and well-nourished. HENT:   Right Ear: External ear normal.   Left Ear: External ear normal.   Mouth/Throat: No oropharyngeal exudate. There is a 4 cm laceration over the right temple. No raccoon eyes or anaya sign. No CSF rhinorrhea   Eyes: Pupils are equal, round, and reactive to light. EOM are normal. Right eye exhibits no discharge. Left eye exhibits no discharge. Neck: Normal range of motion. Cardiovascular: Normal rate, regular rhythm, normal heart sounds and intact distal pulses. Exam reveals no gallop and no friction rub. No murmur heard. 2+ radial pulse in right wrist   Pulmonary/Chest: Effort normal and breath sounds normal. No stridor. No respiratory distress. She has no wheezes. She has no rales. She exhibits tenderness. Mild tenderness to palpate over the right anterior chest with some ecchymosis over the right clavicle and supraclavicular region. No crepitus. No flail chest.   Abdominal: Soft. Bowel sounds are normal. She exhibits no distension and no mass. There is no tenderness. There is no rebound and no guarding. No hernia. Musculoskeletal: She exhibits edema and tenderness. She exhibits no deformity. Tenderness over the right clavicle with some ecchymosis. No midline tenderness or step-off in the neck or back. Gross full range of motion of bilateral lower extremities is in left upper extremity. Neurological: She is alert and oriented to person, place, and time. No cranial nerve deficit. Skin: Skin is warm and dry. No rash noted. She is not diaphoretic. No erythema. Psychiatric: She has a normal mood and affect. Her behavior is normal.   Nursing note and vitals reviewed. DIAGNOSTIC RESULTS   LABS:    Labs Reviewed - No data to display    All other labs were within normal range or not returned as of this dictation. EKG: All EKG's are interpreted by the Emergency Department Physician who either signs orCo-signs this chart in the absence of a cardiologist.  Please see their note for interpretation of EKG. RADIOLOGY:   Non-plain film images such as CT, Ultrasound and MRI are read by the radiologist. [place] radiographic images are visualized andpreliminarily interpreted by the  ED Provider with the below findings:        Interpretation perthe Radiologist below, if available at the time of this note:    CT CHEST WO CONTRAST   Final Result   Comminuted displaced distal right clavicular fracture is again seen. There are nondisplaced right anterior 3rd through 5th rib fractures. No   pneumothorax or acute intrathoracic abnormality. CT Cervical Spine WO Contrast   Final Result   Multilevel degenerative changes. No acute abnormality of the cervical spine. CT Head WO Contrast   Final Result   No acute intracranial abnormality. XR SHOULDER RIGHT (MIN 2 VIEWS)   Preliminary Result   Acute traumatic fracture of the mid to distal 3rd of the right clavicle with   approximately 8 mm of displacement. No acute glenohumeral joint abnormality. No results found.       PROCEDURES   Unless otherwise noted below, none     Lac Repair  Performed by: Kevin Reed PA-C  Authorized by: Leslie Vallecillo MD     Consent:     Consent obtained:  Verbal    Consent given by:  Patient    Risks discussed:  Infection, pain, poor cosmetic result, need for additional repair, poor wound healing and retained foreign body  Anesthesia (see MAR for exact dosages): Anesthesia method:  Local infiltration    Local anesthetic:  Lidocaine 1% WITH epi  Laceration details:     Location:  Scalp    Scalp location:  R temporal    Length (cm):  4  Repair type:     Repair type:  Simple  Pre-procedure details:     Preparation:  Patient was prepped and draped in usual sterile fashion  Treatment:     Area cleansed with:  Saline    Amount of cleaning:  Standard    Irrigation solution:  Sterile saline  Skin repair:     Repair method:  Staples    Number of staples:  4  Approximation:     Approximation:  Close  Post-procedure details:     Patient tolerance of procedure: Tolerated well, no immediate complications        CRITICAL CARE TIME   N/A    CONSULTS:  None      EMERGENCY DEPARTMENT COURSE and DIFFERENTIALDIAGNOSIS/MDM:   Vitals:    Vitals:    04/22/19 1723 04/22/19 2009 04/22/19 2110   BP: (!) 134/38 (!) 131/94 (!) 116/47   Pulse: 56 61 64   Resp: 20 20 20   Temp: 97.7 °F (36.5 °C)     TempSrc: Oral     SpO2: 97% 93% 93%   Weight: 139 lb (63 kg)     Height: 5' 2.5\" (1.588 m)         Patient was given thefollowing medications:  Medications   lidocaine-EPINEPHrine 2 percent-1:949260 injection (has no administration in time range)   Tetanus-Diphth-Acell Pertussis (BOOSTRIX) injection 0.5 mL (0.5 mLs Intramuscular Given 4/22/19 1827)   HYDROcodone-acetaminophen (NORCO) 5-325 MG per tablet 1 tablet (1 tablet Oral Given 4/22/19 1825)   oxyCODONE-acetaminophen (PERCOCET) 5-325 MG per tablet 1 tablet (1 tablet Oral Given 4/22/19 1926)   ondansetron (ZOFRAN-ODT) disintegrating tablet 8 mg (8 mg Oral Given 4/22/19 2008)         Patient presented after head injury with some right shoulder pain and right-sided chest pain after falling and hitting her head against some  stones on a fire pit. He had 3 rib fractures on the right side with comminuted right clavicular fracture.   This is a closed injury and she is distally neurovascularly intact. CT the head and neck are unremarkable. Tetanus vaccination was updated. Laceration was apparent with staples with close approximation wound edges. Patient had better pain control with Percocet here and was able to ambulate maintaining an oxygen saturation around 92-93% on room air. Shared decision making was using I discussed with the patient option of admission or going home. Patient would prefer to go home and states she actually felt better when she was walking. I did discuss this with Dr. Damon Montana and he can see the patient tomorrow. She will follow-up with orthopedics tomorrow. Was educated on suture care. Will return if any worsening symptoms or problems. FINAL IMPRESSION      1. Closed displaced fracture of shaft of right clavicle, initial encounter    2. Closed fracture of multiple ribs of right side, initial encounter    3. Injury of head, initial encounter    4. Laceration of scalp, initial encounter    5. Tetanus toxoid vaccination administered at current visit    6. Fall, initial encounter          DISPOSITION/PLAN   DISPOSITION Decision To Discharge 04/22/2019 09:07:37 PM      PATIENT REFERREDTO:  Kathya Mcadams MD  [address] [place] [address]  [phone]    Schedule an appointment as soon as possible for a visit in 1 day  For re-check    Joaquín Gardiner MD  [address] [place] [address]  [phone]    In 1 week  For suture removal    [facility] Emergency Department  [address] [facility]  [phone]    As needed      DISCHARGE MEDICATIONS:  Discharge Medication List as of 4/22/2019  9:11 PM      START taking these medications    Details   oxyCODONE-acetaminophen (PERCOCET) 5-325 MG per tablet Take 0.5-1 tablets by mouth every 6 hours as needed for Pain for up to 3 days. WARNING:  May cause drowsiness. May impair ability to operate vehicles or machinery.   Do not use in combination with alcohol., Disp-10 tablet, R-0Print      docusate sodium (COLACE) 100 MG capsule Take 1 capsule by mouth 2 times daily as needed for Constipation, Disp-30 capsule, R-0Print             DISCONTINUED MEDICATIONS:  Discharge Medication List as of 4/22/2019  9:11 PM                 (Please note that portions ofthis note were completed with a voice recognition program.  Efforts were made to edit the dictations but occasionally words are mis-transcribed.)    Deann Hudson PA-C (electronically signed)            Deann Hudson PA-C  04/22/19 2961

## 2019-04-23 ENCOUNTER — OFFICE VISIT (OUTPATIENT)
Dept: ORTHOPEDIC SURGERY | Age: 79
End: 2019-04-23
Payer: MEDICARE

## 2019-04-23 VITALS
SYSTOLIC BLOOD PRESSURE: 132 MMHG | RESPIRATION RATE: 16 BRPM | WEIGHT: 139 LBS | BODY MASS INDEX: 25.58 KG/M2 | HEIGHT: 62 IN | DIASTOLIC BLOOD PRESSURE: 54 MMHG | HEART RATE: 59 BPM

## 2019-04-23 DIAGNOSIS — S42.021A CLOSED DISPLACED FRACTURE OF SHAFT OF RIGHT CLAVICLE, INITIAL ENCOUNTER: Primary | ICD-10-CM

## 2019-04-23 PROCEDURE — 93228 REMOTE 30 DAY ECG REV/REPORT: CPT | Performed by: INTERNAL MEDICINE

## 2019-04-23 PROCEDURE — 23500 CLTX CLAVICULAR FX W/O MNPJ: CPT | Performed by: ORTHOPAEDIC SURGERY

## 2019-04-23 PROCEDURE — 99203 OFFICE O/P NEW LOW 30 MIN: CPT | Performed by: ORTHOPAEDIC SURGERY

## 2019-04-23 RX ORDER — HYDROCODONE BITARTRATE AND ACETAMINOPHEN 5; 325 MG/1; MG/1
1 TABLET ORAL EVERY 8 HOURS PRN
Qty: 20 TABLET | Refills: 0 | Status: SHIPPED | OUTPATIENT
Start: 2019-04-23 | End: 2019-04-26

## 2019-04-23 NOTE — ED NOTES
Attempted to ambulate pt with pulse ox. Pt got to the door and wanted to get back in bed due to feeling nauseated. Pulse ox just to door went to 93%. BJ PA aware.      Edgar Saldana RN  04/22/19 2006

## 2019-04-23 NOTE — ED NOTES
Instructed pt and family on incentive spirometer. Pt was given demonstration and stated that her  had to do it in the past. Pt verbalized understanding.       Winston Yoon RN  04/22/19 6771

## 2019-04-23 NOTE — PROGRESS NOTES
CHIEF COMPLAINT: Left shoulder pain/ minimally displaced distal shaft clavicle fracture. DATE OF INJURY: 2019, DOT 2019    HISTORY:  Ms. Carole Serrano is a 77 y.o.  female right handed who presents today for evaluation of a right shoulder injury. The patient reports that this injury occurred when she fell. She was first seen and evaluated in , when she was x-rayed and splinted, and asked to f/u with Orthopedics. The patient denies any other injuries. Movement makes the pain worse, the sling and resting makes the pain better. No numbness or tingling sensation. Past Medical History:   Diagnosis Date    Asthma     Breast cyst     Essential tremor     Hyperlipidemia     Ovarian cyst     Pneumonia     Stroke [facility])     mini       Past Surgical History:   Procedure Laterality Date    CATARACT REMOVAL  2012     SECTION      HYSTERECTOMY  1981    INNER EAR SURGERY  2007       Social History     Socioeconomic History    Marital status:       Spouse name: Not on file    Number of children: Not on file    Years of education: Not on file    Highest education level: Not on file   Occupational History    Not on file   Social Needs    Financial resource strain: Not on file    Food insecurity:     Worry: Not on file     Inability: Not on file    Transportation needs:     Medical: Not on file     Non-medical: Not on file   Tobacco Use    Smoking status: Current Every Day Smoker     Packs/day: 1.00     Years: 2.00     Pack years: 2.00     Types: Cigarettes    Smokeless tobacco: Never Used   Substance and Sexual Activity    Alcohol use: Yes     Comment: very rare sometimes a glass of wine    Drug use: No    Sexual activity: Yes     Partners: Male     Comment:    Lifestyle    Physical activity:     Days per week: Not on file     Minutes per session: Not on file    Stress: Not on file   Relationships    Social connections:     Talks on phone: Not on file Gets together: Not on file     Attends Anabaptist service: Not on file     Active member of club or organization: Not on file     Attends meetings of clubs or organizations: Not on file     Relationship status: Not on file    Intimate partner violence:     Fear of current or ex partner: Not on file     Emotionally abused: Not on file     Physically abused: Not on file     Forced sexual activity: Not on file   Other Topics Concern    Not on file   Social History Narrative    Not on file       Family History   Problem Relation Age of Onset    Heart Disease Mother     Stroke Mother     Heart Disease Father     High Blood Pressure Father     Mental Illness Father        Current Outpatient Medications on File Prior to Visit   Medication Sig Dispense Refill    oxyCODONE-acetaminophen (PERCOCET) 5-325 MG per tablet Take 0.5-1 tablets by mouth every 6 hours as needed for Pain for up to 3 days. WARNING:  May cause drowsiness. May impair ability to operate vehicles or machinery. Do not use in combination with alcohol.  10 tablet 0    docusate sodium (COLACE) 100 MG capsule Take 1 capsule by mouth 2 times daily as needed for Constipation 30 capsule 0    hydrochlorothiazide (HYDRODIURIL) 12.5 MG tablet Take 1 tablet by mouth daily as needed (swelling) 30 tablet 3    sertraline (ZOLOFT) 50 MG tablet TAKE 1 TABLET BY MOUTH EVERY DAY 90 tablet 1    propranolol (INDERAL) 40 MG tablet TAKE 1 TABLET TWICE A  tablet 3    lisinopril (PRINIVIL;ZESTRIL) 40 MG tablet Take 1 tablet by mouth daily 30 tablet 5    amLODIPine (NORVASC) 5 MG tablet TAKE 1 TABLET BY MOUTH EVERY DAY 30 tablet 5    simvastatin (ZOCOR) 20 MG tablet TAKE 1 TABLET NIGHTLY 90 tablet 3    albuterol sulfate HFA (PROVENTIL HFA) 108 (90 Base) MCG/ACT inhaler Inhale 2 puffs into the lungs every 6 hours as needed for Wheezing 1 Inhaler 1    vitamin B-12 (CYANOCOBALAMIN) 1000 MCG tablet Take 1,000 mcg by mouth daily      cetirizine (ZYRTEC) 10 MG tablet Take 10 mg by mouth daily      Multiple Vitamins-Minerals (OCUVITE ADULT 50+) CAPS Take 1 capsule by mouth daily      Flaxseed, Linseed, (FLAXSEED OIL) 1000 MG CAPS Take 1 tablet by mouth daily      ibuprofen (ADVIL;MOTRIN) 600 MG tablet Take 1 tablet by mouth every 6 hours as needed for Pain 60 tablet 1    Glucosamine-Chondroit-Vit C-Mn (GLUCOSAMINE-CHONDROITIN) TABS Take 1 tablet by mouth 2 times daily. No current facility-administered medications on file prior to visit. Pertinent items are noted in HPI  Review of systems reviewed from Patient History Form dated on 4/23/2019 and available in the patient's chart under the Media tab. No change noted. PHYSICAL EXAMINATION:  Ms. Tisha Orozco is a very pleasant 77 y.o.  female who presents today in no acute distress, awake, alert, and oriented. She is well dressed, nourished and  groomed. Patient with normal affect. Height is  5' 2\" (1.575 m), weight is 139 lb (63 kg), Body mass index is 25.42 kg/m². Resting respiratory rate is 16. On evaluation of her bilateral upper extremity, there is no deformity right shoulder. There is moderate swelling and moderate ecchymosis. She is tender to palpation over the clavicle, and otherwise nontender over the remainder of the extremity. Range of motion is decreased secondary to pain over the right shoulder. The skin overlying the right shoulder is intact without evidence of lesion, laceration or skin tenting. Distal pulses are 2+ and symmetric bilaterally. Sensation is grossly intact to light touch and symmetric bilaterally. IMAGING:  Xrays dated 4/22/2019, 2 views of right clavicle were reviewed, and showed minimally displaced clavicle fracture. IMPRESSION:  Right minimally displaced clavicle fracture.     PLAN:  I discussed that the overall alignment of this fracture is good and that we can try to treat this non-operatively in a sling right shoulder, with no heavy impact

## 2019-04-24 DIAGNOSIS — R55 SYNCOPE, UNSPECIFIED SYNCOPE TYPE: ICD-10-CM

## 2019-04-24 NOTE — RESULT ENCOUNTER NOTE
Spoke with son-in-law Shoshana Mohan) and reviewed normal results of event monitor (reviewed by [facility]). [facility]DREN fell two days ago while at the [facility] and was discharged from the ER with a clavicle fracture. He also states she fell this am around 0300 when she got up to go to the bathroom. She is currently at the ER at [facility] and will be admitted. Ray Kilpatrick states he will call me at a later date for follow up.

## 2019-05-09 ENCOUNTER — TELEPHONE (OUTPATIENT)
Dept: ORTHOPEDIC SURGERY | Age: 79
End: 2019-05-09

## 2019-05-09 NOTE — TELEPHONE ENCOUNTER
Forrest Fearing with [facility] needed to know if the patient needed to comeback sooner than 6/11/19 with dr. Silvestre Quach.   [phone]

## 2019-05-09 NOTE — TELEPHONE ENCOUNTER
Spoke with Joceline Crump and explained to her that it is not necessary unless patient is having increased pain or problems

## 2019-05-21 ENCOUNTER — OFFICE VISIT (OUTPATIENT)
Dept: INTERNAL MEDICINE CLINIC | Age: 79
End: 2019-05-21
Payer: MEDICARE

## 2019-05-21 VITALS
BODY MASS INDEX: 24.37 KG/M2 | WEIGHT: 132.4 LBS | DIASTOLIC BLOOD PRESSURE: 70 MMHG | HEIGHT: 62 IN | HEART RATE: 68 BPM | SYSTOLIC BLOOD PRESSURE: 122 MMHG

## 2019-05-21 DIAGNOSIS — D22.9 ATYPICAL NEVUS: ICD-10-CM

## 2019-05-21 DIAGNOSIS — M80.00XD AGE-RELATED OSTEOPOROSIS WITH CURRENT PATHOLOGICAL FRACTURE WITH ROUTINE HEALING, SUBSEQUENT ENCOUNTER: Primary | ICD-10-CM

## 2019-05-21 DIAGNOSIS — K59.03 THERAPEUTIC OPIOID INDUCED CONSTIPATION: ICD-10-CM

## 2019-05-21 DIAGNOSIS — T40.2X5A THERAPEUTIC OPIOID INDUCED CONSTIPATION: ICD-10-CM

## 2019-05-21 PROCEDURE — 99214 OFFICE O/P EST MOD 30 MIN: CPT | Performed by: INTERNAL MEDICINE

## 2019-05-21 RX ORDER — TIZANIDINE 2 MG/1
2 TABLET ORAL 3 TIMES DAILY PRN
Qty: 30 TABLET | Refills: 0 | Status: SHIPPED | OUTPATIENT
Start: 2019-05-21 | End: 2020-12-21 | Stop reason: ALTCHOICE

## 2019-05-21 RX ORDER — ALENDRONATE SODIUM 70 MG/1
70 TABLET ORAL
Qty: 12 TABLET | Refills: 1 | Status: SHIPPED | OUTPATIENT
Start: 2019-05-21 | End: 2019-10-31 | Stop reason: SDUPTHER

## 2019-05-21 RX ORDER — OMEPRAZOLE 20 MG/1
20 CAPSULE, DELAYED RELEASE ORAL
Qty: 30 CAPSULE | Refills: 0 | Status: SHIPPED | OUTPATIENT
Start: 2019-05-21 | End: 2019-06-12 | Stop reason: SDUPTHER

## 2019-05-21 RX ORDER — NAPROXEN 500 MG/1
500 TABLET ORAL 2 TIMES DAILY PRN
Qty: 60 TABLET | Refills: 0 | Status: SHIPPED | OUTPATIENT
Start: 2019-05-21 | End: 2019-06-17 | Stop reason: SDUPTHER

## 2019-05-21 NOTE — PROGRESS NOTES
Chief Complaint   Patient presents with    Follow-up     Pt had several falls and broke collarbone, ribs and injured L1         HPI:  The patient is presenting to follow-up a recent nursing home stay and hospitalization. I have reviewed records. She initially presented to [facility] emergency room on April 24 after she fell. She was found to have an L1 compression fracture and a clavicular fracture. She was treated with pain medication, physical therapy, and occupational therapy. She improved and was discharged to [facility] on April 29. She returned home on May 17. Today she continues to complain of pain. She has pain in the area of her clavicle fracture. She also has pain in her right lower back that radiates to her right lower abdomen. She gets relief with oxycodone 5 mg. She is taking this only at bedtime. She reports the pain is severe. It is aggravated by certain movements. She has associated constipation related to the oxycodone. Her last bowel movement was Sunday. [facility]  She is living with family members  She is not using EtOH      ROS:  Neg for dysuria or hematuria  +constipation, not responding to dulcolax. Neg for dyspnea or cough  Neg for chest pain  She has an enlarging nevus on her right forearm    EXAM:  /70 (Site: Left Upper Arm, Position: Sitting, Cuff Size: Small Adult)   Pulse 68   Ht 5' 2\" (1.575 m)   Wt 132 lb 6.4 oz (60.1 kg)   BMI 24.22 kg/m²    Gen. : Elderly female not in distress  Cardiovascular: Regular rate and rhythm, no murmurs, no peripheral edema  Respiratory: Effort is normal, breath sounds are clear  GI: Abdomen is soft and nontender to palpation  Neuro: Cranial nerves are intact, there are no focal motor deficits  MSK: Left arm is in a sling. There is no tenderness upon palpation of the spinous processes.   Skin: irregular brown nevus on right forearm        Lab Results   Component Value Date    CREATININE 1.0 01/03/2019 BUN 30 (H) 01/03/2019     01/03/2019    K 3.9 01/03/2019     01/03/2019    CO2 24 01/03/2019       A/P  1. Age-related osteoporosis with current pathological fracture with routine healing, subsequent encounter  Recent fall leading to left clavicle fracture and L1 compression fracture. Fractures are being managed nonoperatively. She has follow-up scheduled with orthopedic surgery. We discussed that weaning off of oxycodone as quickly as possible will likely be of benefit to the patient. I have prescribed tizanidine and Naprosyn as alternative pain medications. She will also continue with acetaminophen for pain. She is going to try to wean off of oxycodone. She will continue PT and OT at home. I will also add omeprazole to take while she is using Naprosyn daily. I will also have her begin alendronate to protect against a future fracture. Once she has recovered, we should obtain a DEXA scan. 2. Therapeutic opioid induced constipation  Last bowel movement was 2 days ago. This may be contributing to some of her lower abdominal pain as well. I have recommended a bowel regimen to include MiraLAX twice daily, docusate twice daily, and bisacodyl suppository as needed. I have also encouraged her to be active during the day and increase her water intake. 3. Atypical nevus  She has an irregular nevus which is increasing in size. A referral was provided for excisional biopsy. - Lit Gipson MD, General Surgery, [facility]    Return in 4 weeks for ongoing management of the above issues. .             30 minutes spent with patient- >50 % continuity of care and/or counseling.

## 2019-05-24 ENCOUNTER — OFFICE VISIT (OUTPATIENT)
Dept: SURGERY | Age: 79
End: 2019-05-24
Payer: MEDICARE

## 2019-05-24 VITALS
DIASTOLIC BLOOD PRESSURE: 68 MMHG | HEIGHT: 62 IN | WEIGHT: 132 LBS | BODY MASS INDEX: 24.29 KG/M2 | SYSTOLIC BLOOD PRESSURE: 120 MMHG

## 2019-05-24 DIAGNOSIS — L98.9 SKIN LESION OF WRIST: Primary | ICD-10-CM

## 2019-05-24 PROCEDURE — 99213 OFFICE O/P EST LOW 20 MIN: CPT | Performed by: SURGERY

## 2019-05-24 ASSESSMENT — ENCOUNTER SYMPTOMS
EYES NEGATIVE: 1
BACK PAIN: 1
RESPIRATORY NEGATIVE: 1
ABDOMINAL DISTENTION: 1
ALLERGIC/IMMUNOLOGIC NEGATIVE: 1

## 2019-05-30 ENCOUNTER — TELEPHONE (OUTPATIENT)
Dept: PHARMACY | Facility: CLINIC | Age: 79
End: 2019-05-30

## 2019-05-30 NOTE — TELEPHONE ENCOUNTER
Génesis, can please confirm with pharmacy and/or patient that alendronate has been picked up and/or started? Appears rx sent to [facility] #:[phone]. Thank you,  Catalino Matthews, PharmD, [address]  Direct: [phone]  Department, toll free: [phone], option 7     ==================================================================  CLINICAL PHARMACY CONSULT: RECENT FRACTURE    The [facility]  Team has an initiative to help manage osteoporosis in women with recent fractures. This initiative was started as another strategy to help meet this quality measure for our insurers by reducing the risk of osteoporosis-related fractures. Darline Mckee is a 77 y.o. old female patient who recently had a fracture of clavicle and ribs (4/22/19 ED visit) and L1 compression fracture (4/24/19 [facility] Admission). Note, Current Outpatient Medications Include:   Medication Sig Dispense Refill    alendronate (FOSAMAX) 70 MG tablet Take 1 tablet by mouth every 7 days 12 tablet 1     4/24/19 vitamin D-25OH per [facility] CareEverywhere: 15.7 (L)    Lab Results   Component Value Date    CALCIUM 9.6 01/03/2019    PHOS 3.2 05/14/2018     CrCl cannot be calculated (Patient's most recent lab result is older than the maximum 120 days allowed. ). Assessment/Plan:   - NOF recommends to initiate pharmacologic treatment in those with hip or vertebral fracture - appears alendronate ordered at 5/21/19 hospital f/u appt.   - Continue calcium and vitamin D (per [facility] medication list: calcium-vitamin D 500-200mg daily + ergocalciferol 50,000u every 7 days)

## 2019-05-31 NOTE — TELEPHONE ENCOUNTER
Called [facility] to verify rx was picked up.     Spoke with Clark Mitts and rx was sold 5/21

## 2019-06-03 RX ORDER — LISINOPRIL 40 MG/1
TABLET ORAL
Qty: 90 TABLET | Refills: 3 | Status: SHIPPED | OUTPATIENT
Start: 2019-06-03 | End: 2020-07-27

## 2019-06-04 ENCOUNTER — OFFICE VISIT (OUTPATIENT)
Dept: ORTHOPEDIC SURGERY | Age: 79
End: 2019-06-04

## 2019-06-04 VITALS — RESPIRATION RATE: 16 BRPM | WEIGHT: 132 LBS | HEIGHT: 62 IN | BODY MASS INDEX: 24.29 KG/M2

## 2019-06-04 DIAGNOSIS — S42.021A CLOSED DISPLACED FRACTURE OF SHAFT OF RIGHT CLAVICLE, INITIAL ENCOUNTER: Primary | ICD-10-CM

## 2019-06-04 PROCEDURE — 99024 POSTOP FOLLOW-UP VISIT: CPT | Performed by: NURSE PRACTITIONER

## 2019-06-04 PROCEDURE — APPNB30 APP NON BILLABLE TIME 0-30 MINS: Performed by: NURSE PRACTITIONER

## 2019-06-04 NOTE — PROGRESS NOTES
CHIEF COMPLAINT: Left shoulder pain/ minimally displaced distal shaft clavicle fracture. DATE OF INJURY: 2019, DOT 2019    HISTORY:  Ms. Elizabeth Mooney is a 77 y.o.  female right handed who presents today for follow up evaluation of a right shoulder injury, which occurred when she fell. Denies any pain today and rates a 0/10 VAS. She was first seen and evaluated in , when she was x-rayed and splinted, and asked to f/u with Orthopedics. The patient denies any other injuries. No numbness or tingling sensation. The week after she was diagnosed with clavicle fracture, she fell again and was found to have an L1 compression fracture by [facility]. She is currently in physical therapy for her lumbar spine. Past Medical History:   Diagnosis Date    Asthma     Breast cyst     Essential tremor     Hyperlipidemia     Ovarian cyst     Pneumonia 2000    Stroke [facility])     mini       Past Surgical History:   Procedure Laterality Date    CATARACT REMOVAL       SECTION      HYSTERECTOMY  1981    INNER EAR SURGERY         Social History     Socioeconomic History    Marital status:       Spouse name: Not on file    Number of children: Not on file    Years of education: Not on file    Highest education level: Not on file   Occupational History    Not on file   Social Needs    Financial resource strain: Not on file    Food insecurity:     Worry: Not on file     Inability: Not on file    Transportation needs:     Medical: Not on file     Non-medical: Not on file   Tobacco Use    Smoking status: Current Every Day Smoker     Packs/day: 1.00     Years: 2.00     Pack years: 2.00     Types: Cigarettes    Smokeless tobacco: Never Used   Substance and Sexual Activity    Alcohol use: Yes     Comment: very rare sometimes a glass of wine    Drug use: No    Sexual activity: Yes     Partners: Male     Comment:    Lifestyle    Physical activity:     Days per week: Not on file     Minutes per session: Not on file    Stress: Not on file   Relationships    Social connections:     Talks on phone: Not on file     Gets together: Not on file     Attends Voodoo service: Not on file     Active member of club or organization: Not on file     Attends meetings of clubs or organizations: Not on file     Relationship status: Not on file    Intimate partner violence:     Fear of current or ex partner: Not on file     Emotionally abused: Not on file     Physically abused: Not on file     Forced sexual activity: Not on file   Other Topics Concern    Not on file   Social History Narrative    Not on file       Family History   Problem Relation Age of Onset    Heart Disease Mother     Stroke Mother     Heart Disease Father     High Blood Pressure Father     Mental Illness Father        Current Outpatient Medications on File Prior to Visit   Medication Sig Dispense Refill    lisinopril (PRINIVIL;ZESTRIL) 40 MG tablet TAKE 1 TABLET BY MOUTH EVERY DAY 90 tablet 3    tiZANidine (ZANAFLEX) 2 MG tablet Take 1 tablet by mouth 3 times daily as needed (pain) 30 tablet 0    naproxen (NAPROSYN) 500 MG tablet Take 1 tablet by mouth 2 times daily as needed for Pain 60 tablet 0    omeprazole (PRILOSEC) 20 MG delayed release capsule Take 1 capsule by mouth every morning (before breakfast) 30 capsule 0    alendronate (FOSAMAX) 70 MG tablet Take 1 tablet by mouth every 7 days 12 tablet 1    amLODIPine (NORVASC) 5 MG tablet TAKE 1 TABLET BY MOUTH EVERY DAY 30 tablet 5    docusate sodium (COLACE) 100 MG capsule Take 1 capsule by mouth 2 times daily as needed for Constipation 30 capsule 0    hydrochlorothiazide (HYDRODIURIL) 12.5 MG tablet Take 1 tablet by mouth daily as needed (swelling) 30 tablet 3    sertraline (ZOLOFT) 50 MG tablet TAKE 1 TABLET BY MOUTH EVERY DAY 90 tablet 1    propranolol (INDERAL) 40 MG tablet TAKE 1 TABLET TWICE A  tablet 3    simvastatin (ZOCOR) 20 MG tablet TAKE 1 TABLET NIGHTLY 90 tablet 3    albuterol sulfate HFA (PROVENTIL HFA) 108 (90 Base) MCG/ACT inhaler Inhale 2 puffs into the lungs every 6 hours as needed for Wheezing 1 Inhaler 1    vitamin B-12 (CYANOCOBALAMIN) 1000 MCG tablet Take 1,000 mcg by mouth daily      cetirizine (ZYRTEC) 10 MG tablet Take 10 mg by mouth daily      Multiple Vitamins-Minerals (OCUVITE ADULT 50+) CAPS Take 1 capsule by mouth daily      Flaxseed, Linseed, (FLAXSEED OIL) 1000 MG CAPS Take 1 tablet by mouth daily      ibuprofen (ADVIL;MOTRIN) 600 MG tablet Take 1 tablet by mouth every 6 hours as needed for Pain 60 tablet 1    Glucosamine-Chondroit-Vit C-Mn (GLUCOSAMINE-CHONDROITIN) TABS Take 1 tablet by mouth 2 times daily. No current facility-administered medications on file prior to visit. Pertinent items are noted in HPI  Review of systems reviewed from Patient History Form dated on 4/23/2019 and available in the patient's chart under the Media tab. No change noted. PHYSICAL EXAMINATION:  Ms. Elizabeth Mooney is a very pleasant 77 y.o.  female who presents today in no acute distress, awake, alert, and oriented. She is well dressed, nourished and  groomed. Patient with normal affect. Height is  5' 2\" (1.575 m), weight is 132 lb (59.9 kg), Body mass index is 24.14 kg/m². Resting respiratory rate is 16. On evaluation of her bilateral upper extremity, there is no deformity right shoulder. There is no swelling and no ecchymosis. She is nontender to palpation over the clavicle, and otherwise nontender over the remainder of the extremity. Range of motion is good right shoulder. The skin overlying the right shoulder is intact without evidence of lesion, laceration or skin tenting. Distal pulses are 2+ and symmetric bilaterally. Sensation is grossly intact to light touch and symmetric bilaterally.     IMAGING:  Xrays dated today, 2 views of right clavicle were reviewed, and showed minimally displaced clavicle fracture,stable. IMPRESSION:  Right minimally displaced clavicle fracture. PLAN:  I discussed that the overall alignment of this fracture is stable. She can discontinue the sling right shoulder, WBAT with no heavy impact activities, no heavy lifting, and can work on passive and active ROM. We discussed the risk of nonunion and or malunion. We will see her  back in 2 months at which time we will get a new xray of the right clavicle. She still smokes, and and we discussed with the patient the risks of smoking on general health and also on bone and soft tissue healing (delay and non-union), and promised to cut down or stop smoking.             Eugene Corrales, APRN - CNP

## 2019-06-06 ENCOUNTER — TELEPHONE (OUTPATIENT)
Dept: INTERNAL MEDICINE CLINIC | Age: 79
End: 2019-06-06

## 2019-06-06 DIAGNOSIS — S32.010G CLOSED COMPRESSION FRACTURE OF L1 LUMBAR VERTEBRA WITH DELAYED HEALING, SUBSEQUENT ENCOUNTER: Primary | ICD-10-CM

## 2019-06-06 NOTE — TELEPHONE ENCOUNTER
Pt sustained a lumbar fx in April.  She is still having pain and would like a referral to a spine specialist.

## 2019-06-06 NOTE — TELEPHONE ENCOUNTER
I called and spoke with pt's . He states they spoke with her ortho and they do not have a back specialist in that office. They offered [facility] but he states that is too far for them to drive. They are requesting a referral to a specialist close to [place] [facility]. Dr Inez Gomez usually refers to Dr. Raul Hammond.  I will place the referral.

## 2019-06-06 NOTE — TELEPHONE ENCOUNTER
Verify she is going to PT for her lumbar spine. She is seeing ortho for her clavicle fracture, she can see ortho for her spine (maybe a different provider though).   If she wants to see neurosurgery okay for referral.  Thanks, Macy Cervantes

## 2019-06-13 ENCOUNTER — PROCEDURE VISIT (OUTPATIENT)
Dept: SURGERY | Age: 79
End: 2019-06-13
Payer: MEDICARE

## 2019-06-13 VITALS — SYSTOLIC BLOOD PRESSURE: 118 MMHG | DIASTOLIC BLOOD PRESSURE: 70 MMHG | WEIGHT: 132 LBS | BODY MASS INDEX: 24.14 KG/M2

## 2019-06-13 DIAGNOSIS — D22.9 ATYPICAL NEVI: Primary | ICD-10-CM

## 2019-06-13 DIAGNOSIS — L98.9 SKIN LESION: Primary | ICD-10-CM

## 2019-06-13 PROCEDURE — 11401 EXC TR-EXT B9+MARG 0.6-1 CM: CPT | Performed by: SURGERY

## 2019-06-13 PROCEDURE — 11400 EXC TR-EXT B9+MARG 0.5 CM<: CPT | Performed by: SURGERY

## 2019-06-13 NOTE — PROGRESS NOTES
Office Procedure    Pre op Dx-multiple nevi of the R wrist and forearm    Post op Dx-same    Procedure-Excision of 8 mm skin lesion R forearm, excision of 5 mm skin lesion R forearm, excision of 2 mm skin lesion R wrist     Surgeon-Dr. Piedad Resendiz    Anesthesia-local    EBL-min    Operative indications and consent- 77year old female with multiple suspicious nevi of the R arm. Patient explained risks,benefits, complications and alternatives. Procedure-Patient prepped and draped in the usual sterile fashion. Sitting position. All areas were infiltrated with 1% lidocaine. An 8 mm pigmented skin lesion was excised on the dorsum of the R forearm. A 5 mm scaling skin lesion was excised from the distal aspect of the dorsum of the R forearm. A 2 mm skin lesion was excised on the dorsum of the R wrist. Cautery was applied to all sites. Band aids were then applied.

## 2019-06-24 ENCOUNTER — HOSPITAL ENCOUNTER (OUTPATIENT)
Age: 79
Discharge: HOME | End: 2019-06-24
Payer: MEDICARE

## 2019-06-24 ENCOUNTER — OFFICE VISIT (OUTPATIENT)
Dept: INTERNAL MEDICINE CLINIC | Age: 79
End: 2019-06-24
Payer: MEDICARE

## 2019-06-24 ENCOUNTER — HOSPITAL ENCOUNTER (OUTPATIENT)
Dept: GENERAL RADIOLOGY | Age: 79
Discharge: HOME | End: 2019-06-24
Payer: MEDICARE

## 2019-06-24 VITALS
DIASTOLIC BLOOD PRESSURE: 66 MMHG | BODY MASS INDEX: 23.92 KG/M2 | HEIGHT: 62 IN | WEIGHT: 130 LBS | SYSTOLIC BLOOD PRESSURE: 122 MMHG | HEART RATE: 64 BPM

## 2019-06-24 DIAGNOSIS — M25.551 BILATERAL HIP PAIN: ICD-10-CM

## 2019-06-24 DIAGNOSIS — M54.50 CHRONIC MIDLINE LOW BACK PAIN WITHOUT SCIATICA: Primary | ICD-10-CM

## 2019-06-24 DIAGNOSIS — G89.29 CHRONIC MIDLINE LOW BACK PAIN WITHOUT SCIATICA: Primary | ICD-10-CM

## 2019-06-24 DIAGNOSIS — R82.90 ABNORMAL URINE ODOR: ICD-10-CM

## 2019-06-24 DIAGNOSIS — M25.552 BILATERAL HIP PAIN: ICD-10-CM

## 2019-06-24 LAB
BILIRUBIN, POC: ABNORMAL
BLOOD URINE, POC: ABNORMAL
CLARITY, POC: ABNORMAL
COLOR, POC: YELLOW
GLUCOSE URINE, POC: ABNORMAL
KETONES, POC: ABNORMAL
LEUKOCYTE EST, POC: ABNORMAL
NITRITE, POC: ABNORMAL
PH, POC: 5.5
PROTEIN, POC: ABNORMAL
SPECIFIC GRAVITY, POC: 1.02
UROBILINOGEN, POC: 0.2

## 2019-06-24 PROCEDURE — 81002 URINALYSIS NONAUTO W/O SCOPE: CPT | Performed by: INTERNAL MEDICINE

## 2019-06-24 PROCEDURE — 73522 X-RAY EXAM HIPS BI 3-4 VIEWS: CPT

## 2019-06-24 PROCEDURE — 99214 OFFICE O/P EST MOD 30 MIN: CPT | Performed by: INTERNAL MEDICINE

## 2019-06-24 NOTE — PROGRESS NOTES
CC:back pain, urinary changes    HPI:    She is presenting for management of back pain. Located in the low back, in the middle and right side. The pain radiates to the right side of the abdomen. Quality is sharp and uncomfortable. Severity is up to 10/10; currently it is 4/10. Aggravated by walking, bending over  Alleviated with laying down, ice, oxycodone 2.5mg nightly and ibuprofen 600mg. She reports urinary urgency, malodor and dark color for the past several months. She denies associated dysuria. [address] [facility] [facility] [address]  L1 Compression fracture in April. Past Surgical History:   Procedure Laterality Date    CATARACT REMOVAL       SECTION      HYSTERECTOMY      INNER EAR SURGERY          ROS:  She is having daily BM's; she is using laxative PRN  Neg for dyspnea  Neg for syncope since last visit  Neg for chest pain        EXAM:  /66 (Site: Left Upper Arm, Position: Sitting, Cuff Size: Small Adult)   Pulse 64   Ht 5' 2\" (1.575 m)   Wt 130 lb (59 kg)   BMI 23.78 kg/m²    Wt Readings from Last 3 Encounters:   19 130 lb (59 kg)   19 132 lb (59.9 kg)   19 132 lb (59.9 kg)   GEN: elderly female, not in distress  CV: regular rate and rhythm, no murmurs rubs or gallops  Resp: normal effort, clear auscultation bilaterally  No peripheral edema   GI: +BS, soft, RUQ and epigastric tenderness without rebound or guarding. There are no palpable abdominal masses.   Neuro: Lower extremity motor and sensory function are intact  Skin: there is no rash on the back or abdomen in the area of pain          Lab Results   Component Value Date    CREATININE 1.0 2019    BUN 30 (H) 2019     2019    K 3.9 2019     2019    CO2 24 2019     Lab Results   Component Value Date    WBC 8.5 2019    HGB 13.2 2019    HCT 38.6 2019    MCV 98.1 2019     2019     Xray hip and pelvis done today showed L5/S1 degen

## 2019-06-26 LAB — URINE CULTURE, ROUTINE: NORMAL

## 2019-07-01 ENCOUNTER — OFFICE VISIT (OUTPATIENT)
Dept: SURGERY | Age: 79
End: 2019-07-01

## 2019-07-01 VITALS — SYSTOLIC BLOOD PRESSURE: 130 MMHG | BODY MASS INDEX: 23.78 KG/M2 | DIASTOLIC BLOOD PRESSURE: 74 MMHG | WEIGHT: 130 LBS

## 2019-07-01 DIAGNOSIS — L98.9 SKIN LESION: Primary | ICD-10-CM

## 2019-07-01 PROCEDURE — 99024 POSTOP FOLLOW-UP VISIT: CPT | Performed by: SURGERY

## 2019-07-01 NOTE — LETTER
[address]  [address] [facility] [address] [id number]  Phone: [phone]  Fax: [phone]    Silvia Crespo        July 1, 2019       Patient: Do Jj   MR Number: [id number]   YOB: 1940   Date of Visit: 7/1/2019       Dear Dr. Elizabeth Villalta: Thank you for the request for consultation for [facility]. Below are the relevant portions of my assessment and plan of care. Assessment:     69-year-old female status post excision of skin lesions x3 of the right forearm. All 3 of the lesions were seborrheic keratosis on pathologic examination. She is doing well postoperatively. Plan:     Follow-up as needed. If you have questions, please do not hesitate to call me.      Sincerely,        Lynnette Andrew MD    CC providers:  Brandy Mcgovern MD  [address] [place] [address]  VIA In Basket

## 2019-07-01 NOTE — PROGRESS NOTES
Subjective:      Patient ID: Latina Kehr is a 77 y.o. female. HPI    Review of Systems    Objective:   Physical Exam  Wounds healing well  Assessment:      77-year-old female status post excision of skin lesions x3 of the right forearm. All 3 of the lesions were seborrheic keratosis on pathologic examination. She is doing well postoperatively. Plan:      Follow-up as needed.         Ravindra Rivera MD

## 2019-07-02 ENCOUNTER — HOSPITAL ENCOUNTER (OUTPATIENT)
Age: 79
Discharge: HOME | End: 2019-07-02
Payer: MEDICARE

## 2019-07-02 ENCOUNTER — HOSPITAL ENCOUNTER (OUTPATIENT)
Dept: GENERAL RADIOLOGY | Age: 79
Discharge: HOME | End: 2019-07-02
Payer: MEDICARE

## 2019-07-02 DIAGNOSIS — R52 PAIN: ICD-10-CM

## 2019-07-02 PROCEDURE — 72100 X-RAY EXAM L-S SPINE 2/3 VWS: CPT

## 2019-07-16 ENCOUNTER — OFFICE VISIT (OUTPATIENT)
Dept: ORTHOPEDIC SURGERY | Age: 79
End: 2019-07-16

## 2019-07-16 VITALS — HEIGHT: 62 IN | WEIGHT: 130 LBS | BODY MASS INDEX: 23.92 KG/M2 | RESPIRATION RATE: 16 BRPM

## 2019-07-16 DIAGNOSIS — S42.021A CLOSED DISPLACED FRACTURE OF SHAFT OF RIGHT CLAVICLE, INITIAL ENCOUNTER: Primary | ICD-10-CM

## 2019-07-16 PROCEDURE — 99024 POSTOP FOLLOW-UP VISIT: CPT | Performed by: ORTHOPAEDIC SURGERY

## 2019-07-16 NOTE — PROGRESS NOTES
CHIEF COMPLAINT: Left shoulder pain/ minimally displaced distal shaft clavicle fracture. DATE OF INJURY: 2019, DOT 2019    HISTORY:  Ms. Diony Emerson is a 77 y.o.  female right handed who presents today for 3 months f/u evaluation of a right shoulder injury. The patient reports that this injury occurred when she fell. She was first seen and evaluated in , when she was x-rayed and splinted, and asked to f/u with Orthopedics. The patient denies any other injuries. She reported no pain 0/10 today. No numbness or tingling sensation. Past Medical History:   Diagnosis Date    Asthma     Breast cyst     Essential tremor     Hyperlipidemia     Ovarian cyst     Pneumonia 2000    Stroke [facility])     mini       Past Surgical History:   Procedure Laterality Date    CATARACT REMOVAL  2012     SECTION      HYSTERECTOMY  1981    INNER EAR SURGERY  2007       Social History     Socioeconomic History    Marital status:       Spouse name: Not on file    Number of children: Not on file    Years of education: Not on file    Highest education level: Not on file   Occupational History    Not on file   Social Needs    Financial resource strain: Not on file    Food insecurity:     Worry: Not on file     Inability: Not on file    Transportation needs:     Medical: Not on file     Non-medical: Not on file   Tobacco Use    Smoking status: Current Every Day Smoker     Packs/day: 1.00     Years: 2.00     Pack years: 2.00     Types: Cigarettes    Smokeless tobacco: Never Used   Substance and Sexual Activity    Alcohol use: Yes     Comment: very rare sometimes a glass of wine    Drug use: No    Sexual activity: Yes     Partners: Male     Comment:    Lifestyle    Physical activity:     Days per week: Not on file     Minutes per session: Not on file    Stress: Not on file   Relationships    Social connections:     Talks on phone: Not on file     Gets together: Not on

## 2019-07-17 ENCOUNTER — TELEPHONE (OUTPATIENT)
Dept: INTERNAL MEDICINE CLINIC | Age: 79
End: 2019-07-17

## 2019-07-30 ENCOUNTER — OFFICE VISIT (OUTPATIENT)
Dept: INTERNAL MEDICINE CLINIC | Age: 79
End: 2019-07-30
Payer: MEDICARE

## 2019-07-30 VITALS
BODY MASS INDEX: 22.93 KG/M2 | DIASTOLIC BLOOD PRESSURE: 80 MMHG | HEART RATE: 56 BPM | HEIGHT: 62 IN | SYSTOLIC BLOOD PRESSURE: 138 MMHG | WEIGHT: 124.6 LBS

## 2019-07-30 DIAGNOSIS — M54.50 CHRONIC MIDLINE LOW BACK PAIN WITHOUT SCIATICA: Primary | ICD-10-CM

## 2019-07-30 DIAGNOSIS — G89.29 CHRONIC MIDLINE LOW BACK PAIN WITHOUT SCIATICA: Primary | ICD-10-CM

## 2019-07-30 PROCEDURE — 99213 OFFICE O/P EST LOW 20 MIN: CPT | Performed by: INTERNAL MEDICINE

## 2019-09-16 ENCOUNTER — TELEPHONE (OUTPATIENT)
Dept: INTERNAL MEDICINE CLINIC | Age: 79
End: 2019-09-16

## 2019-09-16 NOTE — TELEPHONE ENCOUNTER
Pt is having issues with sciatic nerve on left side x 2 wks ago. Taking ibuprofen and tylenol. Ice helps   It will go away for a day or two them comes back. Spinal injury in April. Pt asking what she can do for this.      [facility] on file

## 2019-09-19 ENCOUNTER — OFFICE VISIT (OUTPATIENT)
Dept: NEUROLOGY | Age: 79
End: 2019-09-19
Payer: MEDICARE

## 2019-09-19 VITALS
BODY MASS INDEX: 23.22 KG/M2 | HEART RATE: 58 BPM | WEIGHT: 123 LBS | HEIGHT: 61 IN | SYSTOLIC BLOOD PRESSURE: 191 MMHG | DIASTOLIC BLOOD PRESSURE: 82 MMHG

## 2019-09-19 DIAGNOSIS — R29.6 RECURRENT FALLS: ICD-10-CM

## 2019-09-19 DIAGNOSIS — F32.A DEPRESSION, UNSPECIFIED DEPRESSION TYPE: ICD-10-CM

## 2019-09-19 DIAGNOSIS — G25.0 ESSENTIAL TREMOR: Primary | ICD-10-CM

## 2019-09-19 DIAGNOSIS — R55 SYNCOPE AND COLLAPSE: ICD-10-CM

## 2019-09-19 PROCEDURE — 99214 OFFICE O/P EST MOD 30 MIN: CPT | Performed by: PSYCHIATRY & NEUROLOGY

## 2019-09-19 NOTE — PATIENT INSTRUCTIONS
Please call with any questions or concerns:   [facility]  @ [phone]. LAB RESULTS:  Please obtain any labs or diagnostic tests as discussed today. You should call the office to check the results. Please allow  3 to 7 days for us to get these results. MEDICATION LIST:  Please bring an accurate list of your medications to every visit. APPOINTMENT CONFIRMATION:  We will call you the day before your scheduled appointment to confirm. If we are unable to reach you, you MUST call back by the end of the day to confirm the appointment or we may be forced to cancel.

## 2019-10-07 RX ORDER — SIMVASTATIN 20 MG
TABLET ORAL
Qty: 90 TABLET | Refills: 3 | Status: SHIPPED | OUTPATIENT
Start: 2019-10-07 | End: 2020-10-01

## 2019-10-07 RX ORDER — NAPROXEN 500 MG/1
TABLET ORAL
Qty: 180 TABLET | Refills: 0 | OUTPATIENT
Start: 2019-10-07

## 2019-10-31 RX ORDER — ALENDRONATE SODIUM 70 MG/1
TABLET ORAL
Qty: 12 TABLET | Refills: 3 | Status: SHIPPED | OUTPATIENT
Start: 2019-10-31 | End: 2020-09-18

## 2019-11-12 ENCOUNTER — OFFICE VISIT (OUTPATIENT)
Dept: INTERNAL MEDICINE CLINIC | Age: 79
End: 2019-11-12
Payer: MEDICARE

## 2019-11-12 VITALS
SYSTOLIC BLOOD PRESSURE: 130 MMHG | BODY MASS INDEX: 23.6 KG/M2 | HEIGHT: 61 IN | DIASTOLIC BLOOD PRESSURE: 72 MMHG | WEIGHT: 125 LBS | HEART RATE: 64 BPM

## 2019-11-12 DIAGNOSIS — R07.9 LEFT-SIDED CHEST PAIN: Primary | ICD-10-CM

## 2019-11-12 DIAGNOSIS — Z23 NEED FOR INFLUENZA VACCINATION: ICD-10-CM

## 2019-11-12 DIAGNOSIS — F32.1 CURRENT MODERATE EPISODE OF MAJOR DEPRESSIVE DISORDER WITHOUT PRIOR EPISODE (HCC): ICD-10-CM

## 2019-11-12 DIAGNOSIS — E78.2 MIXED HYPERLIPIDEMIA: ICD-10-CM

## 2019-11-12 DIAGNOSIS — I10 ESSENTIAL HYPERTENSION: ICD-10-CM

## 2019-11-12 PROCEDURE — G0008 ADMIN INFLUENZA VIRUS VAC: HCPCS | Performed by: INTERNAL MEDICINE

## 2019-11-12 PROCEDURE — 3288F FALL RISK ASSESSMENT DOCD: CPT | Performed by: INTERNAL MEDICINE

## 2019-11-12 PROCEDURE — 99214 OFFICE O/P EST MOD 30 MIN: CPT | Performed by: INTERNAL MEDICINE

## 2019-11-12 PROCEDURE — 90653 IIV ADJUVANT VACCINE IM: CPT | Performed by: INTERNAL MEDICINE

## 2019-11-12 RX ORDER — ALBUTEROL SULFATE 90 UG/1
2 AEROSOL, METERED RESPIRATORY (INHALATION) EVERY 6 HOURS PRN
Qty: 1 INHALER | Refills: 1 | Status: SHIPPED | OUTPATIENT
Start: 2019-11-12

## 2019-11-12 RX ORDER — SERTRALINE HYDROCHLORIDE 100 MG/1
TABLET, FILM COATED ORAL
Qty: 30 TABLET | Refills: 1 | Status: SHIPPED | OUTPATIENT
Start: 2019-11-12 | End: 2019-12-16 | Stop reason: SDUPTHER

## 2019-11-19 ENCOUNTER — HOSPITAL ENCOUNTER (OUTPATIENT)
Dept: NON INVASIVE DIAGNOSTICS | Age: 79
Discharge: HOME | End: 2019-11-19
Payer: MEDICARE

## 2019-11-19 DIAGNOSIS — R07.9 LEFT-SIDED CHEST PAIN: ICD-10-CM

## 2019-11-19 LAB
LV EF: 64 %
LVEF MODALITY: NORMAL

## 2019-11-19 PROCEDURE — 6360000002 HC RX W HCPCS: Performed by: INTERNAL MEDICINE

## 2019-11-19 PROCEDURE — 78452 HT MUSCLE IMAGE SPECT MULT: CPT | Performed by: INTERNAL MEDICINE

## 2019-11-19 PROCEDURE — 3430000000 HC RX DIAGNOSTIC RADIOPHARMACEUTICAL: Performed by: INTERNAL MEDICINE

## 2019-11-19 PROCEDURE — A9502 TC99M TETROFOSMIN: HCPCS | Performed by: INTERNAL MEDICINE

## 2019-11-19 PROCEDURE — 93017 CV STRESS TEST TRACING ONLY: CPT | Performed by: INTERNAL MEDICINE

## 2019-11-19 RX ORDER — AMINOPHYLLINE DIHYDRATE 25 MG/ML
100 INJECTION, SOLUTION INTRAVENOUS ONCE
Status: COMPLETED | OUTPATIENT
Start: 2019-11-19 | End: 2019-11-19

## 2019-11-19 RX ADMIN — TETROFOSMIN 30 MILLICURIE: 1.38 INJECTION, POWDER, LYOPHILIZED, FOR SOLUTION INTRAVENOUS at 08:09

## 2019-11-19 RX ADMIN — TETROFOSMIN 10 MILLICURIE: 1.38 INJECTION, POWDER, LYOPHILIZED, FOR SOLUTION INTRAVENOUS at 07:15

## 2019-11-19 RX ADMIN — REGADENOSON 0.4 MG: 0.08 INJECTION, SOLUTION INTRAVENOUS at 08:12

## 2019-11-19 RX ADMIN — AMINOPHYLLINE 100 MG: 25 INJECTION, SOLUTION INTRAVENOUS at 08:16

## 2019-12-02 RX ORDER — AMLODIPINE BESYLATE 5 MG/1
TABLET ORAL
Qty: 90 TABLET | Refills: 1 | Status: SHIPPED | OUTPATIENT
Start: 2019-12-02 | End: 2020-06-12

## 2019-12-09 RX ORDER — OMEPRAZOLE 20 MG/1
CAPSULE, DELAYED RELEASE ORAL
Qty: 90 CAPSULE | Refills: 1 | Status: ON HOLD | OUTPATIENT
Start: 2019-12-09 | End: 2020-07-08

## 2019-12-17 RX ORDER — SERTRALINE HYDROCHLORIDE 100 MG/1
TABLET, FILM COATED ORAL
Qty: 30 TABLET | Refills: 0 | Status: SHIPPED | OUTPATIENT
Start: 2019-12-17 | End: 2020-02-17

## 2020-01-07 ENCOUNTER — OFFICE VISIT (OUTPATIENT)
Dept: INTERNAL MEDICINE CLINIC | Age: 80
End: 2020-01-07
Payer: MEDICARE

## 2020-01-07 VITALS
DIASTOLIC BLOOD PRESSURE: 80 MMHG | HEIGHT: 61 IN | HEART RATE: 56 BPM | SYSTOLIC BLOOD PRESSURE: 152 MMHG | BODY MASS INDEX: 23.22 KG/M2 | WEIGHT: 123 LBS

## 2020-01-07 PROCEDURE — 99213 OFFICE O/P EST LOW 20 MIN: CPT | Performed by: INTERNAL MEDICINE

## 2020-01-17 RX ORDER — PROPRANOLOL HYDROCHLORIDE 40 MG/1
TABLET ORAL
Qty: 180 TABLET | Refills: 4 | Status: SHIPPED | OUTPATIENT
Start: 2020-01-17 | End: 2021-04-12

## 2020-01-21 ENCOUNTER — HOSPITAL ENCOUNTER (OUTPATIENT)
Dept: PHYSICAL THERAPY | Age: 80
Setting detail: THERAPIES SERIES
Discharge: HOME | End: 2020-01-21
Payer: MEDICARE

## 2020-01-21 PROCEDURE — 97110 THERAPEUTIC EXERCISES: CPT

## 2020-01-21 PROCEDURE — 97112 NEUROMUSCULAR REEDUCATION: CPT

## 2020-01-21 PROCEDURE — 97162 PT EVAL MOD COMPLEX 30 MIN: CPT

## 2020-01-21 NOTE — FLOWSHEET NOTE
carrying, lifting, house/yardwork, driving, computer work. Modalities:  [] (57655) Vasopneumatic compression: Utilized vasopneumatic compression to decrease edema / swelling for the purpose of improving mobility and quad tone / recruitment which will allow for increased overall function including but not limited to self-care, transfers, ambulation, and ascending / descending stairs. Modalities:      Charges:  Timed Code Treatment Minutes: 40   Total Treatment Minutes: 53     [] EVAL - LOW (69281)   [x] EVAL - MOD (45944)  [] EVAL - HIGH (91840)  [] RE-EVAL (46529)  [x] XY(39302) x  1     [] Ionto  [x] NMR (35262) x 2      [] Vaso  [] Manual (52037) x       [] Ultrasound  [] TA x        [] Mech Traction (34862)  [] Aquatic Therapy x     [] ES (un) (98322):   [] Home Management Training x  [] ES(attended) (48993)   [] Dry Needling 1-2 muscles (27446):  [] Dry Needling 3+ muscles (848684  [] Group:      [] Other:     GOALS:  Patient stated goal: To walk and stand better  []? Progressing: []? Met: []? Not Met: []? Adjusted     Therapist goals for Patient:   Short Term Goals: To be achieved in: 2 weeks  1. Independent in HEP and progression per patient tolerance, in order to prevent re-injury. []? Progressing: []? Met: []? Not Met: []? Adjusted  2. Patient will improve TUG by 2 sec to  facilitate improvement in movement, function, and ADLs as indicated by Functional Deficits. []? Progressing: []? Met: []? Not Met: []? Adjusted     Long Term Goals: To be achieved in: 6 weeks/ DC   1. Patient improve sit to stand test by 4x to assist with reaching prior level of function and reduce fall risk . []? Progressing: []? Met: []? Not Met: []? Adjusted  2. Patient will demonstrate an increase in  LE Strength to at least +4/5 as well as good proximal hip strength and control to allow for proper functional mobility as indicated by patients Functional Deficits. []? Progressing: []? Met: []? Not Met: []? Adjusted  3.

## 2020-01-21 NOTE — PLAN OF CARE
[id number]  [address] [facility], [address]  Phone: [phone]   Fax: [phone]                                                       Physical Therapy Certification    Dear Referring Practitioner: Alanna Paul MD ,    We had the pleasure of evaluating the following patient for physical therapy services at [address] [place] [address]. A summary of our findings can be found in the initial assessment below. This includes our plan of care. If you have any questions or concerns regarding these findings, please do not hesitate to contact me at the office phone number checked above. Thank you for the referral.       Physician Signature:_______________________________Date:__________________  By signing above (or electronic signature), therapists plan is approved by physician      Patient: Anand Hines   : 1940   MRN: [id number]  Referring Physician: Referring Practitioner: Alanna Paul MD       Evaluation Date: 2020      Medical Diagnosis Information:  Diagnosis: Imbalance   Treatment Diagnosis: Difficulty walking, imbalance , BLE muscle weakness                                          Insurance information: PT Insurance Information: A[employer]na Medicare      Precautions/ Contra-indications: Back pain  Latex Allergy:  [x]NO      []YES  Preferred Language for Healthcare:   [x]English       []other:    SUBJECTIVE: Patient stated complaint: Patient reports that she had two falls in the month . Says the first fall was on [place] she tripped on her way to the car, falling on the concrete on right knee. The second occurred in the house on the landing in side the house . Reports that 4-5 years ago  she noticed the hearing in the left ear got worse requiring a hearing aide. 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  States her balance started declining about (F41.9)  []Depression (F32.9)   []Other:   [x]Other:    Multiple Falls  Stroke  Inner ear surgery         Barriers to/and or personal factors that will affect rehab potential:              [x]Age  []Sex    []Smoker              []Motivation/Lack of Motivation                        [x]Co-Morbidities              []Cognitive Function, education/learning barriers              []Environmental, home barriers              []profession/work barriers  []past PT/medical experience  []other:  Justification: Past hx of stroke, hearing loss, inner surgery     Falls Risk Assessment (30 days):   [] Falls Risk assessed and no intervention required.   [x] Falls Risk assessed and Patient requires intervention due to being higher risk   TUG score (>12s at risk): 18.5sec    [] Falls education provided, including        ASSESSMENT:   Functional Impairments:     []Noted lumbar/proximal hip/LE joint hypomobility   []Decreased LE functional ROM   [x]Decreased core/proximal hip strength and neuromuscular control   [x]Decreased LE functional strength   []Reduced balance/proprioceptive control   []other:      Functional Activity Limitations (from functional questionnaire and intake)   [x]Reduced ability to tolerate prolonged functional positions   []Reduced ability or difficulty with changes of positions or transfers between positions   [x]Reduced ability to maintain good posture and demonstrate good body mechanics with sitting, bending, and lifting   []Reduced ability to sleep   [] Reduced ability or tolerance with driving and/or computer work   []Reduced ability to perform lifting, carrying tasks   [x]Reduced ability to squat   []Reduced ability to forward bend   []Reduced ability to ambulate prolonged functional periods/distances/surfaces   []Reduced ability to ascend/descend stairs   []Reduced ability to run, hop, cut or jump   []other:    Participation Restrictions   []Reduced participation in self care activities   [x]Reduced participation in home management activities   []Reduced participation in work activities   [x]Reduced participation in social activities. []Reduced participation in sport/recreation activities. Classification :    []Signs/symptoms consistent with post-surgical status including decreased ROM, strength and function.    []Signs/symptoms consistent with joint sprain/strain   []Signs/symptoms consistent with patella-femoral syndrome   []Signs/symptoms consistent with knee OA/hip OA   []Signs/symptoms consistent with internal derangement of knee/Hip   []Signs/symptoms consistent with functional hip weakness/NMR control      []Signs/symptoms consistent with tendinitis/tendinosis    []signs/symptoms consistent with pathology which may benefit from Dry needling      []other:      Prognosis/Rehab Potential:      [x]Excellent   []Good    []Fair   []Poor    Tolerance of evaluation/treatment:    [x]Excellent   []Good    []Fair   []Poor    Physical Therapy Evaluation Complexity Justification  [x] A history of present problem with:  [] no personal factors and/or comorbidities that impact the plan of care;  [x]1-2 personal factors and/or comorbidities that impact the plan of care  []3 personal factors and/or comorbidities that impact the plan of care  [x] An examination of body systems using standardized tests and measures addressing any of the following: body structures and functions (impairments), activity limitations, and/or participation restrictions;:  [x] a total of 1-2 or more elements   [] a total of 3 or more elements   [] a total of 4 or more elements   [x] A clinical presentation with:  [x] stable and/or uncomplicated characteristics   [] evolving clinical presentation with changing characteristics  [] unstable and unpredictable characteristics;   [x] Clinical decision making of [] low , [x] moderate, [] high complexity using standardized patient assessment instrument and/or measurable assessment of functional outcome. [] EVAL (LOW) 38293 (typically 15 minutes face-to-face)  [x] EVAL (MOD) 62866 (typically 30 minutes face-to-face)  [] EVAL (HIGH) 61423 (typically 45 minutes face-to-face)  [] RE-EVAL     PLAN:   Frequency/Duration:  2x\ days per week for 6 Weeks:  Interventions:  [x]  Therapeutic exercise including: strength training, ROM, for Lower extremity and core   [x]  NMR activation and proprioception for LE, Glutes and Core   [x]  Manual therapy as indicated for LE, Hip and spine to include: Dry Needling/IASTM, STM, PROM, Gr I-IV mobilizations, manipulation. [x] Modalities as needed that may include: thermal agents, E-stim, Biofeedback, US, iontophoresis as indicated  [x] Patient education on joint protection, postural re-education, activity modification, progression of HEP. HEP instruction: Written HEP instructions provided and reviewed . SEE FLOWSHEET      GOALS:  Patient stated goal: To walk and stand better  [] Progressing: [] Met: [] Not Met: [] Adjusted    Therapist goals for Patient:   Short Term Goals: To be achieved in: 2 weeks  1. Independent in HEP and progression per patient tolerance, in order to prevent re-injury. [] Progressing: [] Met: [] Not Met: [] Adjusted  2. Patient will improve TUG by 2 sec to  facilitate improvement in movement, function, and ADLs as indicated by Functional Deficits. [] Progressing: [] Met: [] Not Met: [] Adjusted    Long Term Goals: To be achieved in: 6 weeks/ DC   1. Patient improve sit to stand test by 4x to assist with reaching prior level of function and reduce fall risk . [] Progressing: [] Met: [] Not Met: [] Adjusted  2. Patient will demonstrate an increase in  LE Strength to at least +4/5 as well as good proximal hip strength and control to allow for proper functional mobility as indicated by patients Functional Deficits. [] Progressing: [] Met: [] Not Met: [] Adjusted  3. Patient to report 0 falls in a month period improving confidence with mobility. [] Progressing: [] Met: [] Not Met: [] Adjusted  4. Patient to ambulate with 4 wheeled walker on level and unlevel surfaces without loss of balance.    [] Progressing: [] Met: [] Not Met: [] Adjusted     Electronically signed by:  Domi West PT

## 2020-01-27 ENCOUNTER — HOSPITAL ENCOUNTER (OUTPATIENT)
Dept: PHYSICAL THERAPY | Age: 80
Setting detail: THERAPIES SERIES
Discharge: HOME | End: 2020-01-27
Payer: MEDICARE

## 2020-01-27 PROCEDURE — 97112 NEUROMUSCULAR REEDUCATION: CPT

## 2020-01-27 PROCEDURE — 97110 THERAPEUTIC EXERCISES: CPT

## 2020-01-27 NOTE — FLOWSHEET NOTE
[address] [facility] Physical Therapy  Phone: [phone]   Fax: [phone]    Physical Therapy Daily Treatment Note  Date:  2020    Patient Name:  Ashlee Her    :  1940  MRN: [id number]  Medical/Treatment Diagnosis Information:  · Diagnosis: Imbalance  · Treatment Diagnosis: Difficulty walking, imbalance , BLE muscle weakness   Insurance/Certification information:  PT Insurance Information: A[employer]na Medicare   Physician Information:  Referring Practitioner: Ben Silva MD   Plan of care signed (Y/N): []  Yes [x]  No     Date of Patient follow up with Physician: TBD     Progress Report: []  Yes  [x]  No     Date Range for reporting period:  Beginning 20  Ending    Progress report due (10 Rx/or 30 days whichever is less):      Recertification due (POC duration/ or 90 days whichever is less):  visit    Visit # Insurance Allowable Auth required?  Date Range    [place] []  Yes  [x]  No NA     Latex Allergy:  [x]NO      []YES  Preferred Language for Healthcare:   [x]English       []other:    Functional Scale: TU.5  Date assessed:20    Pain level:  2/10     SUBJECTIVE:  Patient states that she had performing her HEP    OBJECTIVE: See eval      RESTRICTIONS/PRECAUTIONS:  Multiple Falls     Exercises/Interventions:     Therapeutic Exercises (28545) Resistance / level Sets/sec Reps Notes   Nustep/ Bike   5  min     IB       Balance training:   NBOS on Airex   Turn Twist on Airex  Stepping over cones (6)  Tandem       4 laps         Limited UE support SBA                                              Therapeutic Activities (56874)                                          Neuromuscular Re-ed (85540)                                                 Manual Intervention (76999)       MET to correct Right anteriorly rotated inominate , right inominate outflare  x5 min                                            Pt. Education:  -patient educated on diagnosis, prognosis and expectations for rehab  -all patient questions were answered    HEP instruction:  -patient provided with written and illustrated instructions for HEP: 1/21/20: 3 way SLR     Therapeutic Exercise and NMR EXR  [x] (99856) Provided verbal/tactile cueing for activities related to strengthening, flexibility, endurance, ROM for improvements in  [] LE / Lumbar: LE, proximal hip, and core control with self care, mobility, lifting, ambulation. [] UE / Cervical: cervical, postural, scapular, scapulothoracic and UE control with self care, reaching, carrying, lifting, house/yardwork, driving, computer work.  [] (96393) Provided verbal/tactile cueing for activities related to improving balance, coordination, kinesthetic sense, posture, motor skill, proprioception to assist with   [] LE / lumbar: LE, proximal hip, and core control in self care, mobility, lifting, ambulation and eccentric single leg control. [] UE / cervical: cervical, scapular, scapulothoracic and UE control with self care, reaching, carrying, lifting, house/yardwork, driving, computer work.   [] (43997) Therapist is in constant attendance of 2 or more patients providing skilled therapy interventions, but not providing any significant amount of measurable one-on-one time to either patient, for improvements in  [] LE / lumbar: LE, proximal hip, and core control in self care, mobility, lifting, ambulation and eccentric single leg control. [] UE / cervical: cervical, scapular, scapulothoracic and UE control with self care, reaching, carrying, lifting, house/yardwork, driving, computer work.      NMR and Therapeutic Activities:    [] (45315 or 69770) Provided verbal/tactile cueing for activities related to improving balance, coordination, kinesthetic sense, posture, motor skill, proprioception and motor activation to allow for proper function of   [] LE: / Lumbar core, proximal hip and LE with self care and ADLs  [] UE / swelling/inflammation/restriction, improving soft tissue extensibility and allowing for proper ROM for normal function with   [] LE / lumbar: self care, mobility, lifting and ambulation. [] UE / Cervical: self care, reaching, carrying, lifting, house/yardwork, driving, computer work. Modalities:  [] (15041) Vasopneumatic compression: Utilized vasopneumatic compression to decrease edema / swelling for the purpose of improving mobility and quad tone / recruitment which will allow for increased overall function including but not limited to self-care, transfers, ambulation, and ascending / descending stairs. Modalities:      Charges:  Timed Code Treatment Minutes: 31   Total Treatment Minutes: 31     [] EVAL - LOW (77382)   [] EVAL - MOD (74441)  [] EVAL - HIGH (07764)  [] RE-EVAL (72252)  [x] OP(33045) x  1     [] Ionto  [x] NMR (45851) x  1    [] Vaso  [] Manual (35089) x       [] Ultrasound  [] TA x        [] Mech Traction (60732)  [] Aquatic Therapy x     [] ES (un) (77606):   [] Home Management Training x  [] ES(attended) (83323)   [] Dry Needling 1-2 muscles (35964):  [] Dry Needling 3+ muscles (459180  [] Group:      [] Other:     GOALS:  Patient stated goal: To walk and stand better  []? Progressing: []? Met: []? Not Met: []? Adjusted     Therapist goals for Patient:   Short Term Goals: To be achieved in: 2 weeks  1. Independent in HEP and progression per patient tolerance, in order to prevent re-injury. []? Progressing: []? Met: []? Not Met: []? Adjusted  2. Patient will improve TUG by 2 sec to  facilitate improvement in movement, function, and ADLs as indicated by Functional Deficits. []? Progressing: []? Met: []? Not Met: []? Adjusted     Long Term Goals: To be achieved in: 6 weeks/ DC   1. Patient improve sit to stand test by 4x to assist with reaching prior level of function and reduce fall risk . []? Progressing: []? Met: []? Not Met: []? Adjusted  2.  Patient will demonstrate an increase plan (see comments)  [] Plan of care initiated [] Hold pending MD visit [] Discharge    Electronically signed by: Corrie Fraga PT, DPT    Note: If patient does not return for scheduled/ recommended follow up visits, his note will serve as a discharge from care along with most recent update on progress.

## 2020-01-31 ENCOUNTER — HOSPITAL ENCOUNTER (OUTPATIENT)
Dept: PHYSICAL THERAPY | Age: 80
Setting detail: THERAPIES SERIES
Discharge: HOME | End: 2020-01-31
Payer: MEDICARE

## 2020-01-31 PROCEDURE — 97112 NEUROMUSCULAR REEDUCATION: CPT

## 2020-01-31 PROCEDURE — 97110 THERAPEUTIC EXERCISES: CPT

## 2020-01-31 NOTE — FLOWSHEET NOTE
distraction, inferior/lat mobs   X 5 min                                     Pt. Education:  -patient educated on diagnosis, prognosis and expectations for rehab  -all patient questions were answered    HEP instruction:  -patient provided with written and illustrated instructions for HEP: 1/21/20: 3 way SLR     Therapeutic Exercise and NMR EXR  [x] (91521) Provided verbal/tactile cueing for activities related to strengthening, flexibility, endurance, ROM for improvements in  [] LE / Lumbar: LE, proximal hip, and core control with self care, mobility, lifting, ambulation. [] UE / Cervical: cervical, postural, scapular, scapulothoracic and UE control with self care, reaching, carrying, lifting, house/yardwork, driving, computer work.  [] (29126) Provided verbal/tactile cueing for activities related to improving balance, coordination, kinesthetic sense, posture, motor skill, proprioception to assist with   [] LE / lumbar: LE, proximal hip, and core control in self care, mobility, lifting, ambulation and eccentric single leg control. [] UE / cervical: cervical, scapular, scapulothoracic and UE control with self care, reaching, carrying, lifting, house/yardwork, driving, computer work.   [] (34954) Therapist is in constant attendance of 2 or more patients providing skilled therapy interventions, but not providing any significant amount of measurable one-on-one time to either patient, for improvements in  [] LE / lumbar: LE, proximal hip, and core control in self care, mobility, lifting, ambulation and eccentric single leg control. [] UE / cervical: cervical, scapular, scapulothoracic and UE control with self care, reaching, carrying, lifting, house/yardwork, driving, computer work.      NMR and Therapeutic Activities:    [] (11385 or 50485) Provided verbal/tactile cueing for activities related to improving balance, coordination, kinesthetic sense, posture, motor skill, proprioception and motor activation to allow for proper function of   [] LE: / Lumbar core, proximal hip and LE with self care and ADLs  [] UE / Cervical: cervical, postural, scapular, scapulothoracic and UE control with self care, carrying, lifting, driving, computer work.   [] (15507) Gait Re-education- Provided training and instruction to the patient for proper LE, core and proximal hip recruitment and positioning and eccentric body weight control with ambulation re-education including up and down stairs     Home Management Training / Self Care:  [] (54705) Provided self-care/home management training related to activities of daily living and compensatory training, and/or use of adaptive equipment for improvement with: ADLs and compensatory training, meal preparation, safety procedures and instruction in use of adaptive equipment, including bathing, grooming, dressing, personal hygiene, basic household cleaning and chores.      Home Exercise Program:    [x] (46312) Reviewed/Progressed HEP activities related to strengthening, flexibility, endurance, ROM of   [] LE / Lumbar: core, proximal hip and LE for functional self-care, mobility, lifting and ambulation/stair navigation   [] UE / Cervical: cervical, postural, scapular, scapulothoracic and UE control with self care, reaching, carrying, lifting, house/yardwork, driving, computer work  [] (09236)Reviewed/Progressed HEP activities related to improving balance, coordination, kinesthetic sense, posture, motor skill, proprioception of   [] LE: core, proximal hip and LE for self care, mobility, lifting, and ambulation/stair navigation    [] UE / Cervical: cervical, postural,  scapular, scapulothoracic and UE control with self care, reaching, carrying, lifting, house/yardwork, driving, computer work    Manual Treatments:  PROM / STM / Oscillations-Mobs:  G-I, II, III, IV (PA's, Inf., Post.)  [] (92368) Provided manual therapy to mobilize LE, proximal hip and/or LS spine soft tissue/joints for the purpose of modulating medical complications  [] Other:     Prognosis: [x] Good [] Fair  [] Poor    Patient Requires Follow-up: [x] Yes  [] No    Plan for next treatment session:    PLAN: See eval. PT 2x / week for 6 weeks. [x] Continue per plan of care [] Alter current plan (see comments)  [] Plan of care initiated [] Hold pending MD visit [] Discharge    Electronically signed by: Syed Dukes PT, DPT    Note: If patient does not return for scheduled/ recommended follow up visits, his note will serve as a discharge from care along with most recent update on progress.

## 2020-02-03 ENCOUNTER — HOSPITAL ENCOUNTER (OUTPATIENT)
Dept: PHYSICAL THERAPY | Age: 80
Setting detail: THERAPIES SERIES
Discharge: HOME | End: 2020-02-03
Payer: MEDICARE

## 2020-02-03 PROCEDURE — 97110 THERAPEUTIC EXERCISES: CPT

## 2020-02-03 PROCEDURE — 97112 NEUROMUSCULAR REEDUCATION: CPT

## 2020-02-03 NOTE — FLOWSHEET NOTE
[address] [facility] Physical Therapy  Phone: [phone]   Fax: [phone]    Physical Therapy Daily Treatment Note  Date:  2/3/2020    Patient Name:  Desire Strauss    :  1940  MRN: [id number]  Medical/Treatment Diagnosis Information:  · Diagnosis: Imbalance  · Treatment Diagnosis: Difficulty walking, imbalance , BLE muscle weakness   Insurance/Certification information:  PT Insurance Information: Aetna Medicare   Physician Information:  Referring Practitioner: Aleksander Rodriguez MD   Plan of care signed (Y/N): []  Yes [x]  No     Date of Patient follow up with Physician: TBD     Progress Report: []  Yes  [x]  No     Date Range for reporting period:  Beginning 20  Ending    Progress report due (10 Rx/or 30 days whichever is less):      Recertification due (POC duration/ or 90 days whichever is less):  visit    Visit # Insurance Allowable Auth required?  Date Range    [place] []  Yes  [x]  No NA     Latex Allergy:  [x]NO      []YES  Preferred Language for Healthcare:   [x]English       []other:    Functional Scale: TU.5  Date assessed:20    Pain level:  2/10     SUBJECTIVE:  Patient states that over the weekend her hip really hurt     OBJECTIVE: See eval      RESTRICTIONS/PRECAUTIONS:  Multiple Falls     Exercises/Interventions:     Therapeutic Exercises (74442) Resistance / level Sets/sec Reps Notes   Nustep/    5  min     IB/ HR  / 1/10     Balance training:   NBOS on Airex   Turn Twist on Airex  Stepping over cones (6)  Tandem on Airex     Limited UE support SBA    2/3 CGA    HSS  Hip flexor stretch        Mini-Squats                                   Therapeutic Activities (96868)                                          Neuromuscular Re-ed (76815)                                                 Manual Intervention (99864)       MET to correct Right anteriorly rotated inominate , right inominate outflare       Gentle Right hip distraction, inferior/lat mobs   X 5 min                                     Pt. Education: 2/3: Discussed Positioned at night and using pillow between knee's.   -patient educated on diagnosis, prognosis and expectations for rehab  -all patient questions were answered    HEP instruction:  -patient provided with written and illustrated instructions for HEP: 1/21/20: 3 way SLR     Therapeutic Exercise and NMR EXR  [x] (77683) Provided verbal/tactile cueing for activities related to strengthening, flexibility, endurance, ROM for improvements in  [] LE / Lumbar: LE, proximal hip, and core control with self care, mobility, lifting, ambulation. [] UE / Cervical: cervical, postural, scapular, scapulothoracic and UE control with self care, reaching, carrying, lifting, house/yardwork, driving, computer work.  [] (69695) Provided verbal/tactile cueing for activities related to improving balance, coordination, kinesthetic sense, posture, motor skill, proprioception to assist with   [] LE / lumbar: LE, proximal hip, and core control in self care, mobility, lifting, ambulation and eccentric single leg control. [] UE / cervical: cervical, scapular, scapulothoracic and UE control with self care, reaching, carrying, lifting, house/yardwork, driving, computer work.   [] (19490) Therapist is in constant attendance of 2 or more patients providing skilled therapy interventions, but not providing any significant amount of measurable one-on-one time to either patient, for improvements in  [] LE / lumbar: LE, proximal hip, and core control in self care, mobility, lifting, ambulation and eccentric single leg control. [] UE / cervical: cervical, scapular, scapulothoracic and UE control with self care, reaching, carrying, lifting, house/yardwork, driving, computer work.      NMR and Therapeutic Activities:    [] (86554 or [address]) Provided verbal/tactile cueing for activities related to improving balance, coordination, mobilize LE, proximal hip and/or LS spine soft tissue/joints for the purpose of modulating pain, promoting relaxation,  increasing ROM, reducing/eliminating soft tissue swelling/inflammation/restriction, improving soft tissue extensibility and allowing for proper ROM for normal function with   [] LE / lumbar: self care, mobility, lifting and ambulation. [] UE / Cervical: self care, reaching, carrying, lifting, house/yardwork, driving, computer work. Modalities:  [] (48970) Vasopneumatic compression: Utilized vasopneumatic compression to decrease edema / swelling for the purpose of improving mobility and quad tone / recruitment which will allow for increased overall function including but not limited to self-care, transfers, ambulation, and ascending / descending stairs. Modalities:   1/27, 1/31, 2/3  MHP to Right hip supine with bolster underneath BLEs x 10 min     Charges:  Timed Code Treatment Minutes: 34   Total Treatment Minutes: 44     [] EVAL - LOW (01849)   [] EVAL - MOD (30083)  [] EVAL - HIGH (95534)  [] RE-EVAL (65003)  [x] UE(71247) x  1     [] Ionto  [x] NMR (39505) x  1    [] Vaso  [] Manual (83993) x       [] Ultrasound  [] TA x        [] Mech Traction (84850)  [] Aquatic Therapy x     [] ES (un) (09145):   [] Home Management Training x  [] ES(attended) (78930)   [] Dry Needling 1-2 muscles (52583):  [] Dry Needling 3+ muscles (711278  [] Group:      [] Other:     GOALS:  Patient stated goal: To walk and stand better  []? Progressing: []? Met: []? Not Met: []? Adjusted     Therapist goals for Patient:   Short Term Goals: To be achieved in: 2 weeks  1. Independent in HEP and progression per patient tolerance, in order to prevent re-injury. []? Progressing: []? Met: []? Not Met: []? Adjusted  2. Patient will improve TUG by 2 sec to  facilitate improvement in movement, function, and ADLs as indicated by Functional Deficits. []? Progressing: []? Met: []? Not Met: []?  Adjusted     Long Term complications  [] Other:     Prognosis: [x] Good [] Fair  [] Poor    Patient Requires Follow-up: [x] Yes  [] No    Plan for next treatment session: Will hip strengthening exercises. PLAN: See eval. PT 2x / week for 6 weeks. [x] Continue per plan of care [] Alter current plan (see comments)  [] Plan of care initiated [] Hold pending MD visit [] Discharge    Electronically signed by: Denia Cunningham PT, DPT    Note: If patient does not return for scheduled/ recommended follow up visits, his note will serve as a discharge from care along with most recent update on progress.

## 2020-02-07 ENCOUNTER — TELEPHONE (OUTPATIENT)
Dept: INTERNAL MEDICINE CLINIC | Age: 80
End: 2020-02-07

## 2020-02-07 ENCOUNTER — HOSPITAL ENCOUNTER (OUTPATIENT)
Dept: PHYSICAL THERAPY | Age: 80
Setting detail: THERAPIES SERIES
Discharge: HOME | End: 2020-02-07
Payer: MEDICARE

## 2020-02-07 PROCEDURE — 97112 NEUROMUSCULAR REEDUCATION: CPT

## 2020-02-07 PROCEDURE — 97110 THERAPEUTIC EXERCISES: CPT

## 2020-02-07 NOTE — TELEPHONE ENCOUNTER
[facility]  [facility] PAIN AND IS PROGRESSIVELY GETTING WORSE WANTS TO GET REFERAL TO Sonja Horowitz [phone]

## 2020-02-07 NOTE — FLOWSHEET NOTE
[address] [facility] Physical Therapy  Phone: [phone]   Fax: [phone]    Physical Therapy Daily Treatment Note  Date:  2020    Patient Name:  Donnell Mace    :  1940  MRN: [id number]  Medical/Treatment Diagnosis Information:  · Diagnosis: Imbalance  · Treatment Diagnosis: Difficulty walking, imbalance , BLE muscle weakness   Insurance/Certification information:  PT Insurance Information: Aetna Medicare   Physician Information:  Referring Practitioner: Trever King MD   Plan of care signed (Y/N): []  Yes [x]  No     Date of Patient follow up with Physician: DANNIE     Progress Report: []  Yes  [x]  No     Date Range for reporting period:  Beginning 20  Ending    Progress report due (10 Rx/or 30 days whichever is less):      Recertification due (POC duration/ or 90 days whichever is less): 10th visit    Visit # Insurance Allowable Auth required? Date Range    [place] []  Yes  [x]  No NA     Latex Allergy:  [x]NO      []YES  Preferred Language for Healthcare:   [x]English       []other:    Functional Scale: TU.5  Date assessed:20    Pain level:  2/10     SUBJECTIVE:  Patient states that over the last two days her hip had been hurting .      OBJECTIVE:       RESTRICTIONS/PRECAUTIONS:  Multiple Falls     Exercises/Interventions:     Therapeutic Exercises (15108) Resistance / level Sets/sec Reps Notes   Nustep/    5  min     IB/ HR  / 1/10     Balance training:   NBOS on Airex EO/ EC   Turn Twist on Airex  Stepping over cones (6)  Tandem on Airex     Limited UE support SBA    2/3 CGA    HSS  Hip flexor stretch        Mini-Squats       Attempted side stepping but was unable to due increase hip pain                             Therapeutic Activities (74588)                                          Neuromuscular Re-ed (02612)                                                 Manual Intervention (15066)       MET to correct Right anteriorly rotated inominate , right inominate outflare       Gentle Right hip distraction, inferior/lat mobs                                        Pt. Education: 2/3: Discussed Positioned at night and using pillow between knee's.   -patient educated on diagnosis, prognosis and expectations for rehab  -all patient questions were answered    HEP instruction:  -patient provided with written and illustrated instructions for HEP: 1/21/20: 3 way SLR     Therapeutic Exercise and NMR EXR  [x] (02037) Provided verbal/tactile cueing for activities related to strengthening, flexibility, endurance, ROM for improvements in  [] LE / Lumbar: LE, proximal hip, and core control with self care, mobility, lifting, ambulation. [] UE / Cervical: cervical, postural, scapular, scapulothoracic and UE control with self care, reaching, carrying, lifting, house/yardwork, driving, computer work.  [] (54681) Provided verbal/tactile cueing for activities related to improving balance, coordination, kinesthetic sense, posture, motor skill, proprioception to assist with   [] LE / lumbar: LE, proximal hip, and core control in self care, mobility, lifting, ambulation and eccentric single leg control. [] UE / cervical: cervical, scapular, scapulothoracic and UE control with self care, reaching, carrying, lifting, house/yardwork, driving, computer work.   [] (55526) Therapist is in constant attendance of 2 or more patients providing skilled therapy interventions, but not providing any significant amount of measurable one-on-one time to either patient, for improvements in  [] LE / lumbar: LE, proximal hip, and core control in self care, mobility, lifting, ambulation and eccentric single leg control. [] UE / cervical: cervical, scapular, scapulothoracic and UE control with self care, reaching, carrying, lifting, house/yardwork, driving, computer work.      NMR and Therapeutic Activities:    [] (74927 or 76435) Provided verbal/tactile cueing for activities related to improving balance, coordination, kinesthetic sense, posture, motor skill, proprioception and motor activation to allow for proper function of   [] LE: / Lumbar core, proximal hip and LE with self care and ADLs  [] UE / Cervical: cervical, postural, scapular, scapulothoracic and UE control with self care, carrying, lifting, driving, computer work.   [] (52871) Gait Re-education- Provided training and instruction to the patient for proper LE, core and proximal hip recruitment and positioning and eccentric body weight control with ambulation re-education including up and down stairs     Home Management Training / Self Care:  [] (11973) Provided self-care/home management training related to activities of daily living and compensatory training, and/or use of adaptive equipment for improvement with: ADLs and compensatory training, meal preparation, safety procedures and instruction in use of adaptive equipment, including bathing, grooming, dressing, personal hygiene, basic household cleaning and chores.      Home Exercise Program:    [x] (86730) Reviewed/Progressed HEP activities related to strengthening, flexibility, endurance, ROM of   [] LE / Lumbar: core, proximal hip and LE for functional self-care, mobility, lifting and ambulation/stair navigation   [] UE / Cervical: cervical, postural, scapular, scapulothoracic and UE control with self care, reaching, carrying, lifting, house/yardwork, driving, computer work  [] (55218)Reviewed/Progressed HEP activities related to improving balance, coordination, kinesthetic sense, posture, motor skill, proprioception of   [] LE: core, proximal hip and LE for self care, mobility, lifting, and ambulation/stair navigation    [] UE / Cervical: cervical, postural,  scapular, scapulothoracic and UE control with self care, reaching, carrying, lifting, house/yardwork, driving, computer work    Manual Treatments:  PROM / STM / Oscillations-Mobs:  G-I, II, III, IV Progressing: []? Met: []? Not Met: []? Adjusted     Long Term Goals: To be achieved in: 6 weeks/ DC   1. Patient improve sit to stand test by 4x to assist with reaching prior level of function and reduce fall risk . []? Progressing: []? Met: []? Not Met: []? Adjusted  2. Patient will demonstrate an increase in  LE Strength to at least +4/5 as well as good proximal hip strength and control to allow for proper functional mobility as indicated by patients Functional Deficits. []? Progressing: []? Met: []? Not Met: []? Adjusted  3. Patient to report 0 falls in a month period improving confidence with mobility. []? Progressing: []? Met: []? Not Met: []? Adjusted  4. Patient to ambulate with 4 wheeled walker on level and unlevel surfaces without loss of balance. []? Progressing: []? Met: []? Not Met: []? Adjusted          Overall Progression Towards Functional goals/ Treatment Progress Update:  [] Patient is progressing as expected towards functional goals listed. [] Progression is slowed due to complexities/Impairments listed. [] Progression has been slowed due to co-morbidities. [x] Plan just implemented, too soon to assess goals progression <30days   [] Goals require adjustment due to lack of progress  [] Patient is not progressing as expected and requires additional follow up with physician  [] Other    Persisting Functional Limitations/Impairments:  []Sleeping []Sitting               []Standing []Transfers        []Walking []Kneeling               []Stairs []Squatting / bending   []ADLs []Reaching  []Lifting  []Housework  []Driving []Job related tasks  []Sports/Recreation []Other:        ASSESSMENT:  Patient only able to tolerate minimal  Static balance and exercise activity this date due to excessive right hip pain. Pt advised to call her PCP to get ortho consult referral for right hip pain . Patient will be on hold until ortho consult is completed.      Treatment/Activity Tolerance:  [] Patient able to complete tx [] Patient limited by fatigue  [] Patient limited by pain  [] Patient limited by other medical complications  [] Other:     Prognosis: [x] Good [] Fair  [] Poor    Patient Requires Follow-up: [x] Yes  [] No    Plan for next treatment session: Will hip strengthening exercises. PLAN: See eval. PT 2x / week for 6 weeks. [x] Continue per plan of care [] Alter current plan (see comments)  [] Plan of care initiated [] Hold pending MD visit [] Discharge    Electronically signed by: Ely Barnes PT, DPT    Note: If patient does not return for scheduled/ recommended follow up visits, his note will serve as a discharge from care along with most recent update on progress.

## 2020-02-10 NOTE — TELEPHONE ENCOUNTER
It does not look like I have seen her for this pain. I referred her to PT for balance issues. Please offer her an appointment so that this can be addressed.

## 2020-02-10 NOTE — TELEPHONE ENCOUNTER
Called and lm on vmail that she needs to call to schedule an appt with either dr. Randolph Lawler or one of the nurse practitioners.  He can see her next Monday at 4pm

## 2020-02-17 ENCOUNTER — OFFICE VISIT (OUTPATIENT)
Dept: INTERNAL MEDICINE CLINIC | Age: 80
End: 2020-02-17
Payer: MEDICARE

## 2020-02-17 VITALS
DIASTOLIC BLOOD PRESSURE: 58 MMHG | HEART RATE: 60 BPM | SYSTOLIC BLOOD PRESSURE: 118 MMHG | BODY MASS INDEX: 23.17 KG/M2 | WEIGHT: 122.6 LBS

## 2020-02-17 PROCEDURE — 99213 OFFICE O/P EST LOW 20 MIN: CPT | Performed by: INTERNAL MEDICINE

## 2020-02-17 RX ORDER — SERTRALINE HYDROCHLORIDE 100 MG/1
TABLET, FILM COATED ORAL
Qty: 30 TABLET | Refills: 2 | Status: SHIPPED | OUTPATIENT
Start: 2020-02-17 | End: 2020-05-11

## 2020-02-17 NOTE — PROGRESS NOTES
CC: Right hip pain    HPI:  The patient is presenting with right hip pain. Location: right groin region  Onset: about 10 months ago; worsening over the past 2 months  Radiation: none   Aggravating factors: walking, standing  Alleviating factors :APAP, ibuprofen, marijuana (edible)  Severity: is is reported as severe       ROS:  +bilateral leg weakness  Neg for numbness    EXAM:  BP (!) 118/58   Pulse 60   Wt 122 lb 9.6 oz (55.6 kg)   BMI 23.17 kg/m²      General: Nondistressed  Neuro: Sensory function intact light touch in the bilateral lower extremities. She has 4 out of 5 bilateral motor function lower extremities  MSK: There is mild tenderness over the greater trochanter of the right hip. She has full range of motion about the right hip. There is no pain with internal or external rotation about the right hip        A/P   Diagnosis Orders   1. Right hip pain  XR HIP RIGHT (2-3 VIEWS)   Patient has pain in the right hip with walking and standing suggestive of hip arthritis. I have recommended plain films of the right hip for further evaluation of this problem. If plain films confirm the presence of arthritis as the most likely cause of her pain, I will plan to refer her to orthopedic surgery. If plain films are negative, consider films of the lumbar spine to evaluate for degenerative disc disease.

## 2020-02-19 ENCOUNTER — HOSPITAL ENCOUNTER (OUTPATIENT)
Age: 80
Discharge: HOME | End: 2020-02-19
Payer: MEDICARE

## 2020-02-19 ENCOUNTER — HOSPITAL ENCOUNTER (OUTPATIENT)
Dept: GENERAL RADIOLOGY | Age: 80
Discharge: HOME | End: 2020-02-19
Payer: MEDICARE

## 2020-02-19 LAB
ALBUMIN SERPL-MCNC: 4.2 G/DL (ref 3.4–5)
ANION GAP SERPL CALCULATED.3IONS-SCNC: 14 MMOL/L (ref 3–16)
BUN BLDV-MCNC: 37 MG/DL (ref 7–20)
CALCIUM SERPL-MCNC: 9.6 MG/DL (ref 8.3–10.6)
CHLORIDE BLD-SCNC: 101 MMOL/L (ref 99–110)
CHOLESTEROL, TOTAL: 184 MG/DL (ref 0–199)
CO2: 24 MMOL/L (ref 21–32)
CREAT SERPL-MCNC: 1.2 MG/DL (ref 0.6–1.2)
GFR AFRICAN AMERICAN: 52
GFR NON-AFRICAN AMERICAN: 43
GLUCOSE BLD-MCNC: 89 MG/DL (ref 70–99)
HDLC SERPL-MCNC: 37 MG/DL (ref 40–60)
LDL CHOLESTEROL CALCULATED: 109 MG/DL
PHOSPHORUS: 3.9 MG/DL (ref 2.5–4.9)
POTASSIUM SERPL-SCNC: 3.9 MMOL/L (ref 3.5–5.1)
SODIUM BLD-SCNC: 139 MMOL/L (ref 136–145)
TRIGL SERPL-MCNC: 189 MG/DL (ref 0–150)
VLDLC SERPL CALC-MCNC: 38 MG/DL

## 2020-02-19 PROCEDURE — 80061 LIPID PANEL: CPT

## 2020-02-19 PROCEDURE — 36415 COLL VENOUS BLD VENIPUNCTURE: CPT

## 2020-02-19 PROCEDURE — 73502 X-RAY EXAM HIP UNI 2-3 VIEWS: CPT

## 2020-02-19 PROCEDURE — 80069 RENAL FUNCTION PANEL: CPT

## 2020-02-24 ENCOUNTER — PATIENT MESSAGE (OUTPATIENT)
Dept: INTERNAL MEDICINE CLINIC | Age: 80
End: 2020-02-24

## 2020-02-24 RX ORDER — NAPROXEN 500 MG/1
500 TABLET ORAL 2 TIMES DAILY PRN
Qty: 60 TABLET | Refills: 0 | Status: SHIPPED | OUTPATIENT
Start: 2020-02-24 | End: 2020-12-21

## 2020-02-25 NOTE — TELEPHONE ENCOUNTER
From: Orlando Moralez  To: Radha Askew MD  Sent: 2/24/2020 6:08 PM EST  Subject: Test Results Question    Based upon your comments I don't think the pain will just go away. Dr. Sheila Ugalde treated my shoulder and I was not impressed with him. Who would you recommend?    Thank you,   Dee Bowles

## 2020-03-09 ENCOUNTER — OFFICE VISIT (OUTPATIENT)
Dept: ORTHOPEDIC SURGERY | Age: 80
End: 2020-03-09
Payer: MEDICARE

## 2020-03-09 ENCOUNTER — TELEPHONE (OUTPATIENT)
Dept: INTERNAL MEDICINE CLINIC | Age: 80
End: 2020-03-09

## 2020-03-09 VITALS
SYSTOLIC BLOOD PRESSURE: 153 MMHG | HEIGHT: 60 IN | WEIGHT: 123 LBS | BODY MASS INDEX: 24.15 KG/M2 | DIASTOLIC BLOOD PRESSURE: 65 MMHG | HEART RATE: 49 BPM

## 2020-03-09 PROCEDURE — 99213 OFFICE O/P EST LOW 20 MIN: CPT | Performed by: ORTHOPAEDIC SURGERY

## 2020-03-09 NOTE — PROGRESS NOTES
Dania Mays MD  [address]    History of Present Illness:  Chief Complaint   Patient presents with    Hip Pain     Right hip pain x 10 mos. Fell twice April, 2019. Rochelle Hernandez is a [de-identified] y.o. female here for evaluation of right hip pain that has persisted constantly for the past 10 months. Pain assessment has been completed and is documented below. Patient was referred here for orthopaedic evaluation by Dr. Sarah Patterson. Patient reports she fell twice in April 2019. She also reports a history of a L2 compression fracture. She rates her current pain a 1-10/10 in severity. Pain is primarily located in the inguinal area with some radiation to the back. Pain is aggravated by walking or standing for extended periods of time. Patient says her left leg is weaker than her right. She has been taking naproxen and motrin for pain relief, and reports mild relief, but says the pain usually returns. She has been doing physical therapy for her hip. She denies known injury or trauma.      Pain Assessment  Location of Pain: Pelvis  Location Modifiers: Right  Severity of Pain: 1  Quality of Pain: Aching, Sharp  Duration of Pain: Persistent  Frequency of Pain: Constant  Date Pain First Started: (May 2019)  Aggravating Factors: Walking  Limiting Behavior: Yes  Relieving Factors: Rest  Result of Injury: Yes  Work-Related Injury: No  Are there other pain locations you wish to document?: No    Medication Review:  Current Outpatient Medications   Medication Sig Dispense Refill    naproxen (NAPROSYN) 500 MG tablet Take 1 tablet by mouth 2 times daily as needed for Pain 60 tablet 0    sertraline (ZOLOFT) 100 MG tablet TAKE 1 TABLET BY MOUTH EVERY DAY 30 tablet 2    propranolol (INDERAL) 40 MG tablet TAKE 1 TABLET TWICE A  tablet 4    omeprazole Ovarian cyst 1979    Pneumonia 2000    Stroke [facility])     mini        Past Surgical History:   Procedure Laterality Date    CATARACT REMOVAL       SECTION      HYSTERECTOMY  1981    INNER EAR SURGERY        Allergies, social and family histories, and medications were reviewed and updated as appropriate. General Exam:  Vital Signs:  BP (!) 153/65   Pulse (!) 49   Ht 5' (1.524 m)   Wt 123 lb (55.8 kg)   BMI 24.02 kg/m²    Constitutional: Patient is adequately groomed with no evidence of malnutrition. Mental Status: The patient is oriented to time, place and person. The patient's mood and affect are appropriate. Lymphatic: The lymphatic examination bilaterally reveals all areas to be without enlargement or induration. Vascular: Examination reveals no swelling or calf tenderness. 2+ palpable pulses distally. Neurological: The patient has good coordination. There is no focal weakness or sensory deficit. Focal examination of right hip is as follows: Inspection:  Normal muscle contours and no significant limb length discrepancy. No gross atrophy in any particular myotome. Palpation: No tenderness to the greater trochanter/trochanteric bursa. Moderate tenderness to the sacroiliac joint. No tenderness to the knee joint. Range of Motion:    Hip flexion 70°. Hip abduction 30°.  Hip internal rotation at 70° of flexion is 20°. Hip external rotation at 70° of flexion is 40°.  Knee range of motion shows functional range without pain. Ankle dorsiflexion and plantarflexion show functional range of motion. Strength:    Hip flexion 4-/5    Hip abduction 4-/ 5    Hip adduction 4-/5.  Knee flexion and extension 4±5/5 without pain. Dorsiflexion and plantarflexion in ankle are 4±5/5. Special Tests:    Log roll negative.  Stinchfield's negative.  Straight leg raise positive for radicular signs.  FADIR negative. SLIM negative.      Additional comments: Sensation to light touch grossly present and capillary refill less than 2 seconds. Skin: There are no rashes, ulcerations or lesions. Gait: Shuffling gait. Adaptive device: Walks with walker. Radiology: There was no x-ray imaging obtained during today's office encounter. X-rays performed right hip 2/19/2020: Impression   Mild degenerative changes are present about the right hip. Office Orders/Procedures:  No orders of the defined types were placed in this encounter. Impression:   Diagnosis Orders   1. Lumbar stenosis with neurogenic claudication          Treatment Plan:  We discussed treatment options. Her hip x-rays only show mild degenerative change and compared to her age I do not suspect her symptoms are coming from an intra-articular source. Based on clinical presentation and imaging, I suspect her pain symptoms are radicular and indicators of lumbar stenosis. This is possibly related to her previous L2 compression fracture. We will have her schedule an appointment with Dr. Luci Harvey to consider epidural injections and further interventions. I have reviewed patient's pertinent medical history, relevant laboratory and imaging studies, and past surgical history. Patient's medications have been reviewed and were discussed during the visit. Patient was advised to keep future appointments with their respective specialty care team(s). Patient had the opportunity to ask questions, all of which were answered to the best of my ability and with patient satisfaction. Patient understands and is agreeable with the care plan following today's visit. Patient is to schedule an appointment for any new or worsening symptoms. By signing my name below, Pop Ponce, attest that this documentation has been prepared under the direction and in the presence of Coco Lebron MD.   Electronically Signed: Margaret Hall, 3/9/20, 8:27 AM EDT.        Herve Hunter MD,

## 2020-03-13 ENCOUNTER — OFFICE VISIT (OUTPATIENT)
Dept: ORTHOPEDIC SURGERY | Age: 80
End: 2020-03-13
Payer: MEDICARE

## 2020-03-13 VITALS
HEIGHT: 60 IN | WEIGHT: 123.02 LBS | SYSTOLIC BLOOD PRESSURE: 130 MMHG | HEART RATE: 78 BPM | BODY MASS INDEX: 24.15 KG/M2 | DIASTOLIC BLOOD PRESSURE: 74 MMHG

## 2020-03-13 PROCEDURE — 99204 OFFICE O/P NEW MOD 45 MIN: CPT | Performed by: PHYSICAL MEDICINE & REHABILITATION

## 2020-03-13 NOTE — PROGRESS NOTES
Pain First Started: (2019)  Aggravating Factors: Standing, Walking, Bending  Limiting Behavior: Yes  Result of Injury: Yes  Work-Related Injury: No  Are there other pain locations you wish to document?: No      Associated signs and symptoms:   Neurogenic bowel or bladder symptoms:  no   Perceived weakness:  yes   Difficulty walking:  yes    Recent Imaging (within past one year)   Xrays: yes   MRI or CT of spine: yes    Current/Past Treatment:   · Physical Therapy:  yes  · Chiropractic:  yes  · Injection:  none  · Medications:   NSAIDS:  yes   Muscle relaxer:  none   Steriods:  none   Neuropathic medications:  none   Opioids:  yes  · Previous surgery:  no  · Previous surgical consult:  no  · Other:  · Infection control  · Tested positive for MRSA in past 12 months:  no  · Tested positive for MSSA \"staph infection\" in past 12 months: no  · Tested positive for VRE (Vancomycin Resistant Enterococci) in past 12 months:   no  · Currently on any antibiotics for an infection: no  · Anticoagulants:  · On a blood thinner:  no   · Any history of bleeding disorder: no   · MRI Contraindication: yes   · Previous Pain Management: no   · Goal for treatment decrease pain                  Past Medical History:   Past Medical History:   Diagnosis Date    Asthma     Breast cyst     Essential tremor     HTN (hypertension)     Hyperlipidemia     Medical history reviewed with no changes     Ovarian cyst     Pneumonia     Stroke [facility])     mini      Past Surgical History:     Past Surgical History:   Procedure Laterality Date    CATARACT REMOVAL       SECTION      HYSTERECTOMY  1981    INNER EAR SURGERY       Current Medications:     Current Outpatient Medications:     naproxen (NAPROSYN) 500 MG tablet, Take 1 tablet by mouth 2 times daily as needed for Pain, Disp: 60 tablet, Rfl: 0    sertraline (ZOLOFT) 100 MG tablet, TAKE 1 TABLET BY MOUTH EVERY DAY, Disp: 30 tablet, Rfl: 2    propranolol (INDERAL) 40 MG tablet, TAKE 1 TABLET TWICE A DAY, Disp: 180 tablet, Rfl: 4    omeprazole (PRILOSEC) 20 MG delayed release capsule, TAKE 1 CAPSULE BY MOUTH EVERY DAY IN THE MORNING BEFORE BREAKFAST, Disp: 90 capsule, Rfl: 1    amLODIPine (NORVASC) 5 MG tablet, TAKE 1 TABLET EVERY DAY, Disp: 90 tablet, Rfl: 1    albuterol sulfate HFA (PROVENTIL HFA) 108 (90 Base) MCG/ACT inhaler, Inhale 2 puffs into the lungs every 6 hours as needed for Wheezing Any inhaler insurance will cover, Disp: 1 Inhaler, Rfl: 1    alendronate (FOSAMAX) 70 MG tablet, TAKE 1 TABLET BY MOUTH ONE TIME PER WEEK, Disp: 12 tablet, Rfl: 3    simvastatin (ZOCOR) 20 MG tablet, TAKE 1 TABLET NIGHTLY, Disp: 90 tablet, Rfl: 3    lisinopril (PRINIVIL;ZESTRIL) 40 MG tablet, TAKE 1 TABLET BY MOUTH EVERY DAY, Disp: 90 tablet, Rfl: 3    tiZANidine (ZANAFLEX) 2 MG tablet, Take 1 tablet by mouth 3 times daily as needed (pain), Disp: 30 tablet, Rfl: 0    docusate sodium (COLACE) 100 MG capsule, Take 1 capsule by mouth 2 times daily as needed for Constipation, Disp: 30 capsule, Rfl: 0    hydrochlorothiazide (HYDRODIURIL) 12.5 MG tablet, Take 1 tablet by mouth daily as needed (swelling), Disp: 30 tablet, Rfl: 3    vitamin B-12 (CYANOCOBALAMIN) 1000 MCG tablet, Take 1,000 mcg by mouth daily, Disp: , Rfl:     cetirizine (ZYRTEC) 10 MG tablet, Take 10 mg by mouth daily, Disp: , Rfl:     Multiple Vitamins-Minerals (OCUVITE ADULT 50+) CAPS, Take 1 capsule by mouth daily, Disp: , Rfl:     Flaxseed, Linseed, (FLAXSEED OIL) 1000 MG CAPS, Take 1 tablet by mouth daily, Disp: , Rfl:     ibuprofen (ADVIL;MOTRIN) 600 MG tablet, Take 1 tablet by mouth every 6 hours as needed for Pain, Disp: 60 tablet, Rfl: 1    Glucosamine-Chondroit-Vit C-Mn (GLUCOSAMINE-CHONDROITIN) TABS, Take 1 tablet by mouth 2 times daily. , Disp: , Rfl:   Allergies:  Levofloxacin and Penicillins  Social History:    reports that she has been smoking cigarettes.  She has a 50.00 pack-year smoking history. She has never used smokeless tobacco. She reports current alcohol use. She reports that she does not use drugs. Family History:   Family History   Problem Relation Age of Onset    Heart Disease Mother     Stroke Mother     Heart Disease Father     High Blood Pressure Father     Mental Illness Father          REVIEW OF SYSTEMS: Full ROS reviewed & scanned from 3/13/2020           PHYSICAL EXAM:    Vitals: Blood pressure 130/74, pulse 78, height 5' (1.524 m), weight 123 lb 0.3 oz (55.8 kg). GENERAL EXAM:  · General Apparence: Patient is adequately groomed with no evidence of malnutrition. · Psychiatric: Orientation: The patient is oriented to time, place and person. The patient's mood and affect are appropriate   · Vascular: Examination reveals no swelling and palpation reveals no tenderness in upper or lower extremities. Good capillary refill. · The lymphatic examination of the neck, axillae and groin reveals all areas to be without enlargement or induration   Sensation is intact without deficit in the upper and lower extremities to light touch and pinprick  · Coordination of the upper and lower extremities are normal.    CERVICAL EXAMINATION:  · Inspection: Local inspection shows no step-off or bruising. Cervical alignment is normal. No instability is noted. · Palpation and Percussion: No evidence of tenderness at the midline. Paraspinal tenderness is not present. There is no paraspinal spasm. · Range of Motion:  pain-free ROM   · Strength: 5/5 bilateral upper extremities  · Special Tests:   Spurling's and Odom's are negative bilaterally. Yanez and Impingement tests are negative bilaterally. · Skin:There are no rashes, ulcerations or lesions. · Reflexes: Bilaterally triceps, biceps and brachioradialis are 2+. Clonus absent bilaterally at the feet. No pathological reflexes are noted.   · Gait & station:  ambulates with a walker and no ataxia  · Additional Examinations:  · RIGHT Inspection/examination of the left lower extremity does not show any tenderness, deformity or injury. Range of motion is unremarkable. There is no gross instability. There are no rashes, ulcerations or lesions. Strength and tone are normal. No atrophy or abnormal movements are noted. Diagnostic Testing:    Xrays:   I personally reviewed the Xray from 7/2/19  MRI or CT:  I personally reviewed the MRI from 6/12/19  EMG:  None  Results for orders placed or performed during the hospital encounter of 02/19/20   Lipid Panel   Result Value Ref Range    Cholesterol, Total 184 0 - 199 mg/dL    Triglycerides 189 (H) 0 - 150 mg/dL    HDL 37 (L) 40 - 60 mg/dL    LDL Calculated 109 (H) <100 mg/dL    VLDL Cholesterol Calculated 38 Not Established mg/dL   Renal Function Panel   Result Value Ref Range    Sodium 139 136 - 145 mmol/L    Potassium 3.9 3.5 - 5.1 mmol/L    Chloride 101 99 - 110 mmol/L    CO2 24 21 - 32 mmol/L    Anion Gap 14 3 - 16    Glucose 89 70 - 99 mg/dL    BUN 37 (H) 7 - 20 mg/dL    CREATININE 1.2 0.6 - 1.2 mg/dL    GFR Non- 43 (A) >60    GFR  52 (A) >60    Calcium 9.6 8.3 - 10.6 mg/dL    Phosphorus 3.9 2.5 - 4.9 mg/dL    Alb 4.2 3.4 - 5.0 g/dL       Impression (Medical Decision Making):       1. Lumbar stenosis with neurogenic claudication    2. Lumbar radiculopathy    3. Lumbar foraminal stenosis    4. Osteoporosis with pathological fracture with routine healing, subsequent encounter    5. Closed compression fracture of body of L1 vertebra (HCC)        Plan (Medical Decision Making):    I discussed the diagnosis and the treatment options with Doug Georges today. In Summary:  The various treatment options were outlined and discussed with Doug Georges including:  Conservative care options: physical therapy, ice, medications, bracing, and activity modification. The indications for therapeutic injections. The indications for additional imaging/laboratory studies.

## 2020-03-20 ENCOUNTER — TELEPHONE (OUTPATIENT)
Dept: ORTHOPEDIC SURGERY | Age: 80
End: 2020-03-20

## 2020-03-20 ENCOUNTER — HOSPITAL ENCOUNTER (OUTPATIENT)
Dept: GENERAL RADIOLOGY | Age: 80
Discharge: HOME | End: 2020-03-20
Payer: MEDICARE

## 2020-03-20 PROCEDURE — 77080 DXA BONE DENSITY AXIAL: CPT

## 2020-03-20 NOTE — TELEPHONE ENCOUNTER
DOS   03/30/2020  CPT   85225  52191  DX   S42.021A  M48.02   M54.16   M48.061    M80.00XD  OP SX AUTH  [id number]  VALID   03/20/2020 -  09/16/2020    RIGHT  LEVELS   L1  L2   PROCEDURE   TRANS FORAMINAL PRECIOUS    [address] [facility]  INSURANCE:   Paul Courtney      CPT  30256.56  44933  NPR

## 2020-03-23 PROBLEM — M48.062 LUMBAR STENOSIS WITH NEUROGENIC CLAUDICATION: Status: ACTIVE | Noted: 2020-03-23

## 2020-04-06 RX ORDER — HYDROCHLOROTHIAZIDE 12.5 MG/1
12.5 TABLET ORAL DAILY PRN
Qty: 30 TABLET | Refills: 3 | Status: SHIPPED | OUTPATIENT
Start: 2020-04-06 | End: 2020-07-21

## 2020-04-29 ENCOUNTER — TELEPHONE (OUTPATIENT)
Dept: ORTHOPEDIC SURGERY | Age: 80
End: 2020-04-29

## 2020-04-29 NOTE — TELEPHONE ENCOUNTER
Patient is returning a call she received that her son in law answered. She was wondering what the call was about.

## 2020-04-30 ENCOUNTER — OFFICE VISIT (OUTPATIENT)
Dept: PRIMARY CARE CLINIC | Age: 80
End: 2020-04-30
Payer: MEDICARE

## 2020-04-30 ENCOUNTER — TELEPHONE (OUTPATIENT)
Dept: ORTHOPEDIC SURGERY | Age: 80
End: 2020-04-30

## 2020-04-30 VITALS — TEMPERATURE: 98.3 F | HEART RATE: 54 BPM | OXYGEN SATURATION: 96 %

## 2020-04-30 PROCEDURE — 99213 OFFICE O/P EST LOW 20 MIN: CPT | Performed by: NURSE PRACTITIONER

## 2020-04-30 NOTE — PROGRESS NOTES
2020  Mildred Hawkins (:  1940)    FLU/COVID-19 CLINIC EVALUATION    HPI: pre op testing, epidural 2020 with Dr. Gonzalez Rios   SYMPTOMS:none    Symptom duration, days:  [] 1   [] 2   [] 3   [] 4 - 7   [] 8 - 10   [] 11 - 13   [] >14    [] Fevers    [] Symptom (not measured)  [] Measured (Result:  degrees)  [] Chills  [] Cough  [] Coughing up blood  }  [] Chest Congestion  [] Nasal Congestion  [] Sneezing  [] Feeling short of breath  [] Sometimes  [] Frequently   [] All the time     [] Chest pain     [] Headaches  []Tolerable  [] Severe     [] Sore throat  [] Muscle aches  [] Nausea  [] Vomiting  []Unable to keep fluids down     [] Diarrhea  []Severe       [] OTHER SYMPTOMS:      Symptom course:   [] Worsening     [] Stable     [] Improving    RISK FACTORS: smoker1}  [] Pregnant or possibly pregnant  [x] Age over 61  [] Diabetes  [] Heart disease  [x] Asthma  [] COPD/Other chronic lung diseases  [] Active Cancer  [] On Chemotherapy  [] Taking oral steroids  [] History Lymphoma/Leukemia  [] Close contact with a lab confirmed COVID-19 patient within 14 days of symptom onset  [] History of travel from affected geographical areas within 14 days of symptom onset     SOCIAL HISTORY:  Number of people living in patient's home (counting the patient as 1):  [] 1   [] 2   [] 3   [] 4   [] 5   [] >6      PHYSICAL EXAMINATION:  Vitals:    20 1208   Pulse: 54   Temp: 98.3 °F (36.8 °C)   SpO2: 96%        INSTRUCTIONS:  \"[x]\" Indicates a positive item  \"[]\" Indicates a negative item    DELETE ALL ITEMS NOT EXAMINED    [x] Alert  [x] Oriented to person/place/time    [x] No apparent distress  [] Toxic appearing    [] Face flushed appearing [x] Sclera clear  [] Lips are cyanotic      [x] Breathing appears normal  [] Appears tachypneic      [] Rash on visible skin    [] Cranial Nerves II-XII grossly intact    [x] Motor grossly intact in visible upper extremities    [] Motor grossly intact in visible lower

## 2020-05-01 ENCOUNTER — TELEPHONE (OUTPATIENT)
Dept: ORTHOPEDIC SURGERY | Age: 80
End: 2020-05-01

## 2020-05-01 NOTE — PROGRESS NOTES
PATIENT REACHED   YES____NO_x___    PREOP INSTUCTIONS LEFT ON VM NUMBER____[phone]___________      DATE_5/4 1320________ TIME____1220_____ARRIVAL________PLACE_masc -call ride- in car___________  NOTHING TO EAT OR DRINK  AFTER MIDNIGHT THE EVENING PRIOR OR AS INSTRUCTED BY YOUR DR.  Bibi Ramos NEED A RESPONSIBLE ADULT AGE 18 OR OLDER TO DRIVE YOU HOME  PLEASE BRING INSURANCE CARD. PICTURE ID AND COMPLETE LIST OF MEDS  WEAR LOOSE COMFORTABLE CLOTHING  FOLLOW ANY INSTRUCTIONS YOUR DRS OFFICE HAS GIVEN YOU,INCLUDING WHAT MEDICATIONS TO TAKE THE AM OF PROCEDURE AND WHEN AND IF YOU NEED TO STOP ANY BLOOD THINNERS. IF YOU HAVE QUESTIONS REGARDING THIS CALL THE OFFICE  THE GOAL BLOOD SUGAR THE AM OF PROCEDURE  OR LESS ABOVE THAT THE PROCEDURE MAY BE CANCELLED  ANY QUESTIONS CALL YOUR DOCTOR. ALSO,PLEASE READ THE INSTRUCTION PACKET FROM YOUR DR IF YOU RECEIVED ONE.   SPINE INTERVENTION NUMBER [phone]

## 2020-05-04 ENCOUNTER — TELEPHONE (OUTPATIENT)
Dept: ORTHOPEDIC SURGERY | Age: 80
End: 2020-05-04

## 2020-05-05 ENCOUNTER — TELEPHONE (OUTPATIENT)
Dept: ORTHOPEDIC SURGERY | Age: 80
End: 2020-05-05

## 2020-05-05 LAB — SARS-COV-2: NOT DETECTED

## 2020-05-05 NOTE — RESULT ENCOUNTER NOTE
Please contact patient with their testing results: Your test for COVID-19, also known as novel coronavirus, came back negative. No virus was detected from the sample from your nose. Until your symptoms are fully resolved, you may still be contagious. We recommend that you remain isolated for 7 days minimum or 72 hours after your symptoms have completely resolved, whichever is longer. For example, if all symptoms improve after 2 days, you should still remain isolated for 7 days. If your symptoms get better after 10 days, you should remain isolated for 13 days. Continually monitor symptoms. Contact a medical provider if symptoms are worsening. If you have any additional questions, contact your doctor.     For additional information, please visit the Centers for Disease Control and Prevention ([employer]

## 2020-05-06 ENCOUNTER — HOSPITAL ENCOUNTER (OUTPATIENT)
Age: 80
Setting detail: OUTPATIENT SURGERY
Discharge: HOME | End: 2020-05-06
Attending: PHYSICAL MEDICINE & REHABILITATION | Admitting: PHYSICAL MEDICINE & REHABILITATION
Payer: MEDICARE

## 2020-05-06 ENCOUNTER — APPOINTMENT (OUTPATIENT)
Dept: GENERAL RADIOLOGY | Age: 80
End: 2020-05-06
Attending: PHYSICAL MEDICINE & REHABILITATION
Payer: MEDICARE

## 2020-05-06 VITALS
WEIGHT: 120 LBS | RESPIRATION RATE: 16 BRPM | TEMPERATURE: 97.8 F | OXYGEN SATURATION: 98 % | HEIGHT: 62 IN | DIASTOLIC BLOOD PRESSURE: 93 MMHG | HEART RATE: 52 BPM | BODY MASS INDEX: 22.08 KG/M2 | SYSTOLIC BLOOD PRESSURE: 141 MMHG

## 2020-05-06 PROCEDURE — 99152 MOD SED SAME PHYS/QHP 5/>YRS: CPT | Performed by: PHYSICAL MEDICINE & REHABILITATION

## 2020-05-06 PROCEDURE — 6360000002 HC RX W HCPCS: Performed by: PHYSICAL MEDICINE & REHABILITATION

## 2020-05-06 PROCEDURE — 2500000003 HC RX 250 WO HCPCS: Performed by: PHYSICAL MEDICINE & REHABILITATION

## 2020-05-06 PROCEDURE — 3209999900 FLUORO FOR SURGICAL PROCEDURES

## 2020-05-06 PROCEDURE — 3610000056 HC PAIN LEVEL 4 BASE (NON-OR): Performed by: PHYSICAL MEDICINE & REHABILITATION

## 2020-05-06 PROCEDURE — 3610000057 HC PAIN LEVEL 4 ADDL 15 MIN (NON-OR): Performed by: PHYSICAL MEDICINE & REHABILITATION

## 2020-05-06 PROCEDURE — 2709999900 HC NON-CHARGEABLE SUPPLY: Performed by: PHYSICAL MEDICINE & REHABILITATION

## 2020-05-06 RX ORDER — MIDAZOLAM HYDROCHLORIDE 1 MG/ML
INJECTION INTRAMUSCULAR; INTRAVENOUS
Status: COMPLETED | OUTPATIENT
Start: 2020-05-06 | End: 2020-05-06

## 2020-05-06 RX ORDER — BUPIVACAINE HYDROCHLORIDE 5 MG/ML
INJECTION, SOLUTION EPIDURAL; INTRACAUDAL
Status: COMPLETED | OUTPATIENT
Start: 2020-05-06 | End: 2020-05-06

## 2020-05-06 RX ORDER — BETAMETHASONE SODIUM PHOSPHATE AND BETAMETHASONE ACETATE 3; 3 MG/ML; MG/ML
INJECTION, SUSPENSION INTRA-ARTICULAR; INTRALESIONAL; INTRAMUSCULAR; SOFT TISSUE
Status: COMPLETED | OUTPATIENT
Start: 2020-05-06 | End: 2020-05-06

## 2020-05-06 RX ORDER — FENTANYL CITRATE 50 UG/ML
INJECTION, SOLUTION INTRAMUSCULAR; INTRAVENOUS
Status: COMPLETED | OUTPATIENT
Start: 2020-05-06 | End: 2020-05-06

## 2020-05-06 ASSESSMENT — PAIN - FUNCTIONAL ASSESSMENT: PAIN_FUNCTIONAL_ASSESSMENT: 0-10

## 2020-05-06 ASSESSMENT — PAIN SCALES - GENERAL
PAINLEVEL_OUTOF10: 0
PAINLEVEL_OUTOF10: 0

## 2020-05-06 ASSESSMENT — PAIN DESCRIPTION - DESCRIPTORS: DESCRIPTORS: ACHING

## 2020-05-06 NOTE — H&P
resp. rate 16, height 5' 2\" (1.575 m), weight 120 lb (54.4 kg), SpO2 98 %. PHYSICAL EXAM:including affected areas  HENT: Airway patent and reviewed  Cardiovascular: Normal rate, regular rhythm, normal heart sounds. Pulmonary/Chest: No wheezes. No rhonchi. No rales. Abdominal: Soft. Bowel sounds are normal. No distension. Extremities: Moves all extremities equally  Cervical and Lumbar Spine: Painful range of motion, no midline tenderness       Diagnosis:Lumbar radiculopathy  S42.021A  M48.062  M54.16  M48.061  M80.00XD    Plan: Proceed with planned procedure      ASA CLASS:         []   I. Normal, healthy adult           [x]   II.  Mild systemic disease            []   III. Severe systemic disease      Mallampati: Mallampati Class II - (soft palate, fauces & uvula are visible)      Sedation plan:   [x]  Local              []  Minimal                  []  General anesthesia    Patient's condition acceptable for planned procedure/sedation. Post Procedure Plan   Return to same level of care   ______________________     The patient was counseled at length about the risks of nia Covid-19 in the lexie-operative and post-operative states including the recovery window of their procedure. The patient was made aware that nia Covid-19 after a surgical procedure may worsen their prognosis for recovering from the virus and lend to a higher morbidity and or mortality risk. The patient was given the options of postponing their procedure. All of the risks, benefits, and alternatives were discussed. The patient does wish to proceed with the procedure. The risks and benefits as well as alternatives to the procedure have been discussed with the patient and or family. The patient and or next of kin understands and agrees to proceed.     Dee Jackson M.D.

## 2020-05-06 NOTE — OP NOTE
Patient:  Cailin Travis  YOB: 1940  Medical Record #:  [id number]   Place: [address] [place] [address]  Date:  5/6/2020   Physician:  Kenneth Weeks MD, TAMARA    Procedure: 1. Transforaminal Lumbar Epidural Steroid Injection -  right L1  CPT 79755          2. Transforaminal Lumbar Epidural Steroid Injection -  right L2  CPT 53852     Pre-Procedure Diagnosis: Lumbar radiculopathy      Post-Procedure Diagnosis: Same    Sedation: Local with 1% Lidocaine 3 ml and 1 mg of IV Versed and 25 mcg of IV Fentanyl    EBL: None    Complications: None    Procedure Summary:        The patient was brought to the procedure suite and placed in the prone position. The skin overlying the lumbar spine was prepped and draped in the usual sterile fashion. Using fluoroscopic guidance, the right L1 foramen was identified. Through anesthetized skin, a 22 gauge 3.5 inch curved tip spinal needle was advanced into the foramen. Isovue M 300 was instilled showing an epidurogram/nerve root outline pattern without evidence of vascular or intrathecal spread. Following which, 7.5 mg of Celestone mixed with 1 ml of 0.5% Marcaine was instilled. The needle was removed. Using fluoroscopic guidance, the right L2 foramen was identified. Through anesthetized skin, a 22 gauge 3.5 inch curved tip spinal needle was advanced into the foramen. Isovue M 300 was instilled showing an epidurogram/nerve root outline pattern without evidence of vascular or intrathecal spread. Following which, 7.5 mg of Celestone mixed with 1 ml of 0.5% Marcaine was instilled. The needle was removed and band-aids were applied. The patient was transferred to the post-operative area in stable condition.

## 2020-05-11 RX ORDER — SERTRALINE HYDROCHLORIDE 100 MG/1
TABLET, FILM COATED ORAL
Qty: 90 TABLET | Refills: 1 | Status: SHIPPED | OUTPATIENT
Start: 2020-05-11 | End: 2020-11-04

## 2020-05-18 ENCOUNTER — VIRTUAL VISIT (OUTPATIENT)
Dept: ORTHOPEDIC SURGERY | Age: 80
End: 2020-05-18
Payer: MEDICARE

## 2020-05-18 VITALS — HEIGHT: 62 IN | RESPIRATION RATE: 12 BRPM | WEIGHT: 119.93 LBS | BODY MASS INDEX: 22.07 KG/M2

## 2020-05-18 PROCEDURE — 99213 OFFICE O/P EST LOW 20 MIN: CPT | Performed by: PHYSICIAN ASSISTANT

## 2020-05-18 NOTE — PROGRESS NOTES
Hyperlipidemia     Medical history reviewed with no changes     Ovarian cyst     Pneumonia     Stroke [facility])     mini      Past Surgical History:     Past Surgical History:   Procedure Laterality Date    CATARACT REMOVAL  2012     SECTION      HYSTERECTOMY  1981    INNER EAR SURGERY  2007    PAIN MANAGEMENT PROCEDURE Right 2020    RIGHT L1 AND L2 TRANSFORAMINAL EPIDURAL STEROID INJECTION WITH FLUOROSCOPY (14637, 18517) performed by Citlaly Redman MD at [facility]     Current Medications:     Current Outpatient Medications:     sertraline (ZOLOFT) 100 MG tablet, TAKE 1 TABLET BY MOUTH EVERY DAY, Disp: 90 tablet, Rfl: 1    hydrochlorothiazide (HYDRODIURIL) 12.5 MG tablet, Take 1 tablet by mouth daily as needed (swelling), Disp: 30 tablet, Rfl: 3    naproxen (NAPROSYN) 500 MG tablet, Take 1 tablet by mouth 2 times daily as needed for Pain, Disp: 60 tablet, Rfl: 0    propranolol (INDERAL) 40 MG tablet, TAKE 1 TABLET TWICE A DAY, Disp: 180 tablet, Rfl: 4    omeprazole (PRILOSEC) 20 MG delayed release capsule, TAKE 1 CAPSULE BY MOUTH EVERY DAY IN THE MORNING BEFORE BREAKFAST, Disp: 90 capsule, Rfl: 1    amLODIPine (NORVASC) 5 MG tablet, TAKE 1 TABLET EVERY DAY, Disp: 90 tablet, Rfl: 1    albuterol sulfate HFA (PROVENTIL HFA) 108 (90 Base) MCG/ACT inhaler, Inhale 2 puffs into the lungs every 6 hours as needed for Wheezing Any inhaler insurance will cover, Disp: 1 Inhaler, Rfl: 1    alendronate (FOSAMAX) 70 MG tablet, TAKE 1 TABLET BY MOUTH ONE TIME PER WEEK, Disp: 12 tablet, Rfl: 3    simvastatin (ZOCOR) 20 MG tablet, TAKE 1 TABLET NIGHTLY, Disp: 90 tablet, Rfl: 3    lisinopril (PRINIVIL;ZESTRIL) 40 MG tablet, TAKE 1 TABLET BY MOUTH EVERY DAY, Disp: 90 tablet, Rfl: 3    tiZANidine (ZANAFLEX) 2 MG tablet, Take 1 tablet by mouth 3 times daily as needed (pain), Disp: 30 tablet, Rfl: 0    docusate sodium (COLACE) 100 MG capsule, Take 1 capsule by mouth 2 times daily as needed for Constipation, Disp: 30 capsule, Rfl: 0    vitamin B-12 (CYANOCOBALAMIN) 1000 MCG tablet, Take 1,000 mcg by mouth daily, Disp: , Rfl:     cetirizine (ZYRTEC) 10 MG tablet, Take 10 mg by mouth daily, Disp: , Rfl:     Multiple Vitamins-Minerals (OCUVITE ADULT 50+) CAPS, Take 1 capsule by mouth daily, Disp: , Rfl:     Flaxseed, Linseed, (FLAXSEED OIL) 1000 MG CAPS, Take 1 tablet by mouth daily, Disp: , Rfl:     ibuprofen (ADVIL;MOTRIN) 600 MG tablet, Take 1 tablet by mouth every 6 hours as needed for Pain, Disp: 60 tablet, Rfl: 1    Glucosamine-Chondroit-Vit C-Mn (GLUCOSAMINE-CHONDROITIN) TABS, Take 1 tablet by mouth 2 times daily. , Disp: , Rfl:   Allergies:  Levofloxacin and Penicillins  Social History:    reports that she has been smoking cigarettes. She has a 50.00 pack-year smoking history. She has never used smokeless tobacco. She reports current alcohol use. She reports that she does not use drugs. Family History:   Family History   Problem Relation Age of Onset    Heart Disease Mother     Stroke Mother     Heart Disease Father     High Blood Pressure Father     Mental Illness Father        REVIEW OF SYSTEMS:   CONSTITUTIONAL: Denies unexplained weight loss, fevers, chills or fatigue  NEUROLOGICAL: Denies unsteady gait or progressive weakness  MUSCULOSKELETAL: Denies joint swelling or redness  GI: Denies nausea, vomiting, diarrhea   : Denies bowel or bladder issues       PHYSICAL EXAM:  Limitations present due to Telehealth  Vitals: Resp. rate 12, height 5' 2.01\" (1.575 m), weight 119 lb 14.9 oz (54.4 kg). GENERAL EXAM:  · General Apparence: Patient is adequately groomed with no evidence of malnutrition. · Psychiatric: Orientation: The patient is oriented to time, place and person. The patient's mood and affect are appropriate   · Vascular: Examination reveals no swelling and palpation reveals no tenderness in upper or lower extremities. Good capillary refill.    · The lymphatic examination of the

## 2020-05-19 NOTE — PROGRESS NOTES
PATIENT REACHED   YES_X___NO____    PREOP INSTRUCTIONS   Patient reminded to get their COVID-19 test as directed by their doctor if they have not already completed. Instructed to self quarantine after test until DOS. There is a no visitor policy at [facility]. The patients ride is expected to remain in the car with a cell phone for communication. If the ride is leaving the hospital grounds please make sure they are back in time for pickup. Have the patient inform the staff on arrival what their rides plans are while the patient is in the facility. DATE__5/27/20_______ TIME_1415________ARRIVAL__1315______PLACE__MASC__________  NOTHING TO EAT OR DRINK  AFTER MIDNIGHT THE EVENING PRIOR OR AS INSTRUCTED BY YOUR DR.  Dima Cassidy NEED A RESPONSIBLE ADULT AGE 18 OR OLDER TO DRIVE YOU HOME  PLEASE BRING INSURANCE CARD. PICTURE ID AND COMPLETE LIST OF MEDS  WEAR LOOSE COMFORTABLE CLOTHING  FOLLOW ANY INSTRUCTIONS YOUR DRS OFFICE HAS GIVEN YOU,INCLUDING WHAT MEDICATIONS TO TAKE THE AM OF PROCEDURE AND WHEN AND IF YOU NEED TO STOP ANY BLOOD THINNERS. IF YOU HAVE QUESTIONS REGARDING THIS CALL THE OFFICE  THE GOAL BLOOD SUGAR THE AM OF PROCEDURE  OR LESS ABOVE THAT THE PROCEDURE MAY BE CANCELLED  ANY QUESTIONS CALL YOUR DOCTOR. ALSO,PLEASE READ THE INSTRUCTION PACKET FROM YOUR DR IF YOU RECEIVED ONE.   SPINE INTERVENTION NUMBER [phone]

## 2020-05-20 ENCOUNTER — OFFICE VISIT (OUTPATIENT)
Dept: PRIMARY CARE CLINIC | Age: 80
End: 2020-05-20

## 2020-05-23 LAB
SARS-COV-2: NOT DETECTED
SOURCE: NORMAL

## 2020-05-27 ENCOUNTER — APPOINTMENT (OUTPATIENT)
Dept: GENERAL RADIOLOGY | Age: 80
End: 2020-05-27
Attending: PHYSICAL MEDICINE & REHABILITATION
Payer: MEDICARE

## 2020-05-27 ENCOUNTER — HOSPITAL ENCOUNTER (OUTPATIENT)
Age: 80
Setting detail: OUTPATIENT SURGERY
Discharge: HOME | End: 2020-05-27
Attending: PHYSICAL MEDICINE & REHABILITATION | Admitting: PHYSICAL MEDICINE & REHABILITATION
Payer: MEDICARE

## 2020-05-27 VITALS
DIASTOLIC BLOOD PRESSURE: 53 MMHG | OXYGEN SATURATION: 95 % | RESPIRATION RATE: 16 BRPM | BODY MASS INDEX: 23.41 KG/M2 | HEART RATE: 50 BPM | WEIGHT: 124 LBS | TEMPERATURE: 98 F | SYSTOLIC BLOOD PRESSURE: 113 MMHG | HEIGHT: 61 IN

## 2020-05-27 PROCEDURE — 99152 MOD SED SAME PHYS/QHP 5/>YRS: CPT | Performed by: PHYSICAL MEDICINE & REHABILITATION

## 2020-05-27 PROCEDURE — 3610000056 HC PAIN LEVEL 4 BASE (NON-OR): Performed by: PHYSICAL MEDICINE & REHABILITATION

## 2020-05-27 PROCEDURE — 2500000003 HC RX 250 WO HCPCS: Performed by: PHYSICAL MEDICINE & REHABILITATION

## 2020-05-27 PROCEDURE — 2709999900 HC NON-CHARGEABLE SUPPLY: Performed by: PHYSICAL MEDICINE & REHABILITATION

## 2020-05-27 PROCEDURE — 3209999900 FLUORO FOR SURGICAL PROCEDURES

## 2020-05-27 PROCEDURE — 6360000002 HC RX W HCPCS: Performed by: PHYSICAL MEDICINE & REHABILITATION

## 2020-05-27 RX ORDER — BUPIVACAINE HYDROCHLORIDE 5 MG/ML
INJECTION, SOLUTION EPIDURAL; INTRACAUDAL
Status: COMPLETED | OUTPATIENT
Start: 2020-05-27 | End: 2020-05-27

## 2020-05-27 RX ORDER — LIDOCAINE HYDROCHLORIDE 10 MG/ML
INJECTION, SOLUTION INFILTRATION; PERINEURAL
Status: COMPLETED | OUTPATIENT
Start: 2020-05-27 | End: 2020-05-27

## 2020-05-27 RX ORDER — MIDAZOLAM HYDROCHLORIDE 1 MG/ML
INJECTION INTRAMUSCULAR; INTRAVENOUS
Status: COMPLETED | OUTPATIENT
Start: 2020-05-27 | End: 2020-05-27

## 2020-05-27 RX ORDER — FENTANYL CITRATE 50 UG/ML
INJECTION, SOLUTION INTRAMUSCULAR; INTRAVENOUS
Status: COMPLETED | OUTPATIENT
Start: 2020-05-27 | End: 2020-05-27

## 2020-05-27 RX ORDER — BETAMETHASONE SODIUM PHOSPHATE AND BETAMETHASONE ACETATE 3; 3 MG/ML; MG/ML
INJECTION, SUSPENSION INTRA-ARTICULAR; INTRALESIONAL; INTRAMUSCULAR; SOFT TISSUE
Status: COMPLETED | OUTPATIENT
Start: 2020-05-27 | End: 2020-05-27

## 2020-05-27 ASSESSMENT — PAIN - FUNCTIONAL ASSESSMENT: PAIN_FUNCTIONAL_ASSESSMENT: 0-10

## 2020-05-27 ASSESSMENT — PAIN SCALES - GENERAL
PAINLEVEL_OUTOF10: 0
PAINLEVEL_OUTOF10: 0

## 2020-05-27 ASSESSMENT — PAIN DESCRIPTION - DESCRIPTORS: DESCRIPTORS: THROBBING

## 2020-05-27 NOTE — PROGRESS NOTES
IV discontinued, catheter intact, and dressing applied. Procedural dressing dry and intact. Bilateral lower extremities equal in strength. Discharge instructions reviewed with patient or responsible adult, signed and copy given. All home medications have been reviewed. All questions answered and patient or responsible adult verbalized understanding.   PAIN LEVEL AT DISCHARGE _0__

## 2020-05-27 NOTE — OP NOTE
Patient:  Albino Park  YOB: 1940  Medical Record #:  [id number]   Place: [address] [place] [address]  Date:  5/27/2020   Physician:  Kervin Melgar MD, TAMARA    Procedure: 1. Transforaminal Lumbar Epidural Steroid Injection -  right L1  CPT 95112          2. Transforaminal Lumbar Epidural Steroid Injection -  right L2  CPT 61079    Pre-Procedure Diagnosis: Lumbar radiculopathy      Post-Procedure Diagnosis: Same    Sedation: Local with 1% Lidocaine 3 ml and 1 mg of IV Versed with 25 mcg of IV Fentanyl    EBL: None    Complications: None    Procedure Summary:        The patient was brought to the procedure suite and placed in the prone position. The skin overlying the lumbar spine was prepped and draped in the usual sterile fashion. Using fluoroscopic guidance, the right L1 foramen was identified. Through anesthetized skin, a 22 gauge 3.5 inch curved tip spinal needle was advanced into the foramen. Isovue M 300 was instilled showing an epidurogram/nerve root outline pattern without evidence of vascular or intrathecal spread. Following which, 7.5 mg of Celestone mixed with 1 ml of 0.5% Marcaine was instilled. The needle was removed. Using fluoroscopic guidance, the right L2 foramen was identified. Through anesthetized skin, a 22 gauge 3.5 inch curved tip spinal needle was advanced into the foramen. Isovue M 300 was instilled showing an epidurogram/nerve root outline pattern without evidence of vascular or intrathecal spread. Following which, 7.5 mg of Celestone mixed with 1 ml of 0.5% Marcaine was instilled. The needle was removed and band-aids were applied. The patient was transferred to the post-operative area in stable condition.

## 2020-05-27 NOTE — H&P
108 (90 Base) MCG/ACT inhaler Inhale 2 puffs into the lungs every 6 hours as needed for Wheezing Any inhaler insurance will cover 11/12/19  Yes Faviola Brewster MD   simvastatin (ZOCOR) 20 MG tablet TAKE 1 TABLET NIGHTLY 10/7/19  Yes Faviola Brewster MD   lisinopril (PRINIVIL;ZESTRIL) 40 MG tablet TAKE 1 TABLET BY MOUTH EVERY DAY 6/3/19  Yes Raj Ace DO   vitamin B-12 (CYANOCOBALAMIN) 1000 MCG tablet Take 1,000 mcg by mouth daily   Yes Historical Provider, MD   Multiple Vitamins-Minerals (OCUVITE ADULT 50+) CAPS Take 1 capsule by mouth daily   Yes Historical Provider, MD   ibuprofen (ADVIL;MOTRIN) 600 MG tablet Take 1 tablet by mouth every 6 hours as needed for Pain 3/18/16  Yes Faviola Brewster MD   omeprazole (PRILOSEC) 20 MG delayed release capsule TAKE 1 CAPSULE BY MOUTH EVERY DAY IN THE MORNING BEFORE BREAKFAST 12/9/19   YOSEF Weaver CNP   alendronate (FOSAMAX) 70 MG tablet TAKE 1 TABLET BY MOUTH ONE TIME PER WEEK 10/31/19   YOSEF Weaver CNP   tiZANidine (ZANAFLEX) 2 MG tablet Take 1 tablet by mouth 3 times daily as needed (pain) 5/21/19   Faviola Brewster MD   docusate sodium (COLACE) 100 MG capsule Take 1 capsule by mouth 2 times daily as needed for Constipation 4/22/19   Jhonatan Bustos PA-C   cetirizine (ZYRTEC) 10 MG tablet Take 10 mg by mouth daily    Historical Provider, MD   Flaxseed, Linseed, (FLAXSEED OIL) 1000 MG CAPS Take 1 tablet by mouth daily    Historical Provider, MD   Glucosamine-Chondroit-Vit C-Mn (GLUCOSAMINE-CHONDROITIN) TABS Take 1 tablet by mouth 2 times daily. Historical Provider, MD     Allergies:  Levofloxacin and Penicillins  Social History:    reports that she has been smoking cigarettes. She has a 50.00 pack-year smoking history. She has never used smokeless tobacco. She reports current alcohol use. She reports that she does not use drugs.   Family History:   Family History   Problem Relation Age of Onset    Heart patient and or next of kin understands and agrees to proceed.     Sonny Stern M.D.

## 2020-06-01 RX ORDER — LISINOPRIL 40 MG/1
TABLET ORAL
Qty: 90 TABLET | Refills: 3 | OUTPATIENT
Start: 2020-06-01

## 2020-06-12 RX ORDER — AMLODIPINE BESYLATE 5 MG/1
TABLET ORAL
Qty: 90 TABLET | Refills: 1 | Status: SHIPPED | OUTPATIENT
Start: 2020-06-12 | End: 2020-12-18

## 2020-06-17 ENCOUNTER — VIRTUAL VISIT (OUTPATIENT)
Dept: ORTHOPEDIC SURGERY | Age: 80
End: 2020-06-17
Payer: MEDICARE

## 2020-06-17 PROCEDURE — 99213 OFFICE O/P EST LOW 20 MIN: CPT | Performed by: PHYSICIAN ASSISTANT

## 2020-06-17 NOTE — PROGRESS NOTES
no   Perceived weakness:  yes   Difficulty walking:  yes            Past medical, surgical, social and family history reviewed with the patient.     Past Medical History:   Past Medical History:   Diagnosis Date    Asthma     Breast cyst     Essential tremor     HTN (hypertension)     Hyperlipidemia     Medical history reviewed with no changes     Ovarian cyst     Pneumonia 2000    Stroke [facility])     mini      Past Surgical History:     Past Surgical History:   Procedure Laterality Date    CATARACT REMOVAL       SECTION      HYSTERECTOMY  1981    INNER EAR SURGERY  2007    PAIN MANAGEMENT PROCEDURE Right 2020    RIGHT L1 AND L2 TRANSFORAMINAL EPIDURAL STEROID INJECTION WITH FLUOROSCOPY (31057, 84881) performed by Heidy Sam MD at [address] Right 2020    RIGHT L1 AND L2 TRANSFORAMINAL EPIDURAL STEROID INJECTION WITH FLUOROSCOPY (37523, 67524) performed by Heidy Sam MD at [facility]     Current Medications:     Current Outpatient Medications:     amLODIPine (NORVASC) 5 MG tablet, TAKE 1 TABLET EVERY DAY, Disp: 90 tablet, Rfl: 1    sertraline (ZOLOFT) 100 MG tablet, TAKE 1 TABLET BY MOUTH EVERY DAY, Disp: 90 tablet, Rfl: 1    hydrochlorothiazide (HYDRODIURIL) 12.5 MG tablet, Take 1 tablet by mouth daily as needed (swelling), Disp: 30 tablet, Rfl: 3    naproxen (NAPROSYN) 500 MG tablet, Take 1 tablet by mouth 2 times daily as needed for Pain, Disp: 60 tablet, Rfl: 0    propranolol (INDERAL) 40 MG tablet, TAKE 1 TABLET TWICE A DAY, Disp: 180 tablet, Rfl: 4    omeprazole (PRILOSEC) 20 MG delayed release capsule, TAKE 1 CAPSULE BY MOUTH EVERY DAY IN THE MORNING BEFORE BREAKFAST, Disp: 90 capsule, Rfl: 1    albuterol sulfate HFA (PROVENTIL HFA) 108 (90 Base) MCG/ACT inhaler, Inhale 2 puffs into the lungs every 6 hours as needed for Wheezing Any inhaler insurance will cover, Disp: 1 Inhaler, Rfl: 1    alendronate (FOSAMAX) 70 MG tablet, Patient-Reported Vitals 6/17/2020   Patient-Reported Weight 56.2kg   Patient-Reported Height 1.549m      GENERAL EXAM:  · General Apparence: Patient is adequately groomed with no evidence of malnutrition. · Psychiatric: Orientation: The patient is oriented to time, place and person. The patient's mood and affect are appropriate   · Vascular: Examination reveals no swelling and palpation reveals no tenderness in upper or lower extremities. Good capillary refill. · The lymphatic examination of the neck, axillae and groin reveals all areas to be without enlargement or induration   Sensation is intact without deficit in the upper and lower extremities to light touch   · Coordination of the upper and lower extremities are normal.  · RIGHT UPPER EXTREMITY:  Inspection/examination of the right upper extremity does not show any tenderness, deformity or injury. Range of motion is normal and pain-free. There is no gross instability. There are no rashes, ulcerations or lesions. Strength and tone are normal. No atrophy or abnormal movements are noted. · LEFT UPPER EXTREMITY: Inspection/examination of the left upper extremity does not show any tenderness, deformity or injury. Range of motion is normal and pain-free. There is no gross instability. There are no rashes, ulcerations or lesions. Strength and tone are normal. No atrophy or abnormal movements are noted. LUMBAR/SACRAL EXAMINATION:  · Inspection: Local inspection shows no step-off or bruising. Lumbar alignment is normal.   · Palpation by patient:   No evidence of tenderness at the midline. Lumbar paraspinal tenderness No tenderness to palpation of the lumbar paraspinals  Bursal tenderness No tenderness bilaterally  There is no paraspinal spasm.   · Range of Motion: limited by 25% in all planes due to pain  · Strength:   Strength testing is at least 4/5 in all muscle groups tested based on visual exam.  · Special Tests:   Straight leg raise and crossed SLR recommendations for new medications. 2. PT:  Encouraged to continue with Home exercise program.    3. Further studies: No further studies. 4. Interventional: We could consider possible medial branch block for the patient as the patient's pain is majority in her back. The MRI will be reviewed with the patient in the office when we see her on Monday. The patient's daughter will be present in this visit at that time. 5. Follow up:  Monday 6/22/20      Christiano De La Torre was instructed to call the office if her symptoms worsen or if new symptoms appear prior to the next scheduled visit. She is specifically instructed to contact the office between now & her scheduled appointment if she has concerns related to her condition or if she needs assistance in scheduling the above tests. She is welcome to call for an appointment sooner if she has any additional concerns or questions. Amanda EDMOND am scribing for [employer]. [address], [place].  06/17/20 9:51 AM Amanda Zapata. The physical examination was performed between the patient and "[employer]GUDELIA Leblanc. All counseling during the appointment was performed between the patient and the provider. Bere Garcia PA-C, personally performed the services described in this documentation as scribed by Amanda Sagastume ATC in my presence and it is both accurate and complete. PASQUALE Stanford PA-C  Board Certified by the [employer] on Certification of Physician Assistants  Jerrod Solorzano 57  Partner of [facility] ([place])         This dictation was performed with a verbal recognition program [facility] ) and it was checked for errors. It is possible that there are still dictated errors within this office note. If so, please bring any errors to my attention for an addendum. All efforts were made to ensure that this office note is accurate.       Patient has verbally accepted the

## 2020-06-22 ENCOUNTER — OFFICE VISIT (OUTPATIENT)
Dept: ORTHOPEDIC SURGERY | Age: 80
End: 2020-06-22
Payer: MEDICARE

## 2020-06-22 VITALS
WEIGHT: 123.9 LBS | DIASTOLIC BLOOD PRESSURE: 58 MMHG | BODY MASS INDEX: 23.39 KG/M2 | HEART RATE: 50 BPM | HEIGHT: 61 IN | SYSTOLIC BLOOD PRESSURE: 94 MMHG

## 2020-06-22 PROCEDURE — 99214 OFFICE O/P EST MOD 30 MIN: CPT | Performed by: PHYSICIAN ASSISTANT

## 2020-06-22 NOTE — PROGRESS NOTES
and symptoms:   Neurogenic bowel or bladder symptoms:  no   Perceived weakness:  no   Difficulty walking:  yes            Past medical, surgical, social and family history reviewed with the patient.      Past Medical History:   Past Medical History:   Diagnosis Date    Asthma     Breast cyst     Essential tremor     HTN (hypertension)     Hyperlipidemia     Medical history reviewed with no changes     Ovarian cyst     Pneumonia 2000    Stroke [facility])     mini      Past Surgical History:     Past Surgical History:   Procedure Laterality Date    CATARACT REMOVAL       SECTION      HYSTERECTOMY  1981    INNER EAR SURGERY  2007    PAIN MANAGEMENT PROCEDURE Right 2020    RIGHT L1 AND L2 TRANSFORAMINAL EPIDURAL STEROID INJECTION WITH FLUOROSCOPY (60576, 30726) performed by Phill Alex MD at [address] Right 2020    RIGHT L1 AND L2 TRANSFORAMINAL EPIDURAL STEROID INJECTION WITH FLUOROSCOPY (76576, 11481) performed by Phill Alex MD at [facility]     Current Medications:     Current Outpatient Medications:     amLODIPine (NORVASC) 5 MG tablet, TAKE 1 TABLET EVERY DAY, Disp: 90 tablet, Rfl: 1    sertraline (ZOLOFT) 100 MG tablet, TAKE 1 TABLET BY MOUTH EVERY DAY, Disp: 90 tablet, Rfl: 1    hydrochlorothiazide (HYDRODIURIL) 12.5 MG tablet, Take 1 tablet by mouth daily as needed (swelling), Disp: 30 tablet, Rfl: 3    naproxen (NAPROSYN) 500 MG tablet, Take 1 tablet by mouth 2 times daily as needed for Pain, Disp: 60 tablet, Rfl: 0    propranolol (INDERAL) 40 MG tablet, TAKE 1 TABLET TWICE A DAY, Disp: 180 tablet, Rfl: 4    omeprazole (PRILOSEC) 20 MG delayed release capsule, TAKE 1 CAPSULE BY MOUTH EVERY DAY IN THE MORNING BEFORE BREAKFAST, Disp: 90 capsule, Rfl: 1    albuterol sulfate HFA (PROVENTIL HFA) 108 (90 Base) MCG/ACT inhaler, Inhale 2 puffs into the lungs every 6 hours as needed for Wheezing Any inhaler insurance will cover, Disp: 1 EXAM:    Vitals: Blood pressure (!) 94/58, pulse 50, height 5' 0.98\" (1.549 m), weight 123 lb 14.4 oz (56.2 kg). GENERAL EXAM:  · General Apparence: Patient is adequately groomed with no evidence of malnutrition. · Psychiatric: Orientation: The patient is oriented to time, place and person. The patient's mood and affect are appropriate   · Vascular: Examination reveals no swelling and palpation reveals no tenderness in upper or lower extremities. Good capillary refill. · The lymphatic examination of the neck, axillae and groin reveals all areas to be without enlargement or induration  · Sensation is intact without deficit in the upper and lower extremities to light touch and pinprick  · Coordination of the upper and lower extremities are normal.  · RIGHT UPPER EXTREMITY:  Inspection/examination of the right upper extremity does not show any tenderness, deformity or injury. Range of motion is unremarkable and pain-free. There is no gross instability. There are no rashes, ulcerations or lesions. Strength and tone are normal. No atrophy or abnormal movements are noted. · LEFT UPPER EXTREMITY: Inspection/examination of the left upper extremity does not show any tenderness, deformity or injury. Range of motion is unremarkable and pain-free. There is no gross instability. There are no rashes, ulcerations or lesions. Strength and tone are normal. No atrophy or abnormal movements are noted. LUMBAR/SACRAL EXAMINATION:  · Inspection: Local inspection shows no step-off or bruising. Lumbar alignment is normal. No instability is noted. · Palpation:   No evidence of tenderness at the midline. Lumbar paraspinal tenderness: Mild L4/5 and L5/S1 tenderness extending to the entirety of the lumbar spine. Bursal tenderness No tenderness bilaterally  There is no paraspinal spasm.   · Range of Motion: limited by 50% in all planes due to pain specifically with extension and  · Strength:   Strength testing is 5/5 in all lumbar medial branch blocks at the Bilateral L1, L2, L3 levels. Risks, benefits and alternatives were discussed. These include but are not limited to bleeding, infection, increased pain, lack of pain relief and nerve injury. The patient verbalized understanding and would like to proceed. If there is significant pain relief and functional improvement, the patient may be a good candidate for Radiofrequency ablation. 5. Follow up:  2-3 weeks      Shaista Connelly was instructed to call the office if her symptoms worsen or if new symptoms appear prior to the next scheduled visit. She is specifically instructed to contact the office between now & her scheduled appointment if she has concerns related to her condition or if she needs assistance in scheduling the above tests. She is welcome to call for an appointment sooner if she has any additional concerns or questions. PASQUALE Rodriguez, PAArthurC  Board Certified by the [employer] on Certification of Physician Assistants  Jerrod Solorzano 57  Partner of [facility] ([facility])        This dictation was performed with a verbal recognition program [facility]) and it was checked for errors. It is possible that there are still dictated errors within this office note. If so, please bring any errors to my attention for an addendum. All efforts were made to ensure that this office note is accurate.

## 2020-06-30 ENCOUNTER — TELEPHONE (OUTPATIENT)
Dept: ORTHOPEDIC SURGERY | Age: 80
End: 2020-06-30

## 2020-06-30 NOTE — PROGRESS NOTES
PATIENT REACHED   YES____NO__x__    PREOP INSTUCTIONS LEFT ON  [id number]  Patient instructed to get their COVID-19 test done as directed by their doctor (5-7 days prior to procedure)  or patient states will get on ___7/2_______. I The day the COVID test is done is considered day one. Instructed to self quarantine after test until DOS. There is a one visitor policy at [facility] for the pre-op phase only for surgery and endoscopy cases. The visitor is expected to leave the facility after the patient is taken back for the procedure. Pain management is NO VISITOR policyThe patients ride is expected to remain in the car with a cell phone for communication. If the ride is leaving the hospital grounds please make sure they are back in time for pickup. Have the patient inform the staff on arrival what their rides plans are while the patient is in the facility. At the [facility] there is one visitor allowed. Please note that the visitor policy is subject to change. DATE__7/8/20_______ TIME__1410_______ARRIVAL_1310______PLACE__masc__________  NOTHING TO EAT OR DRINK  AFTER MIDNIGHT THE EVENING PRIOR OR AS INSTRUCTED BY YOUR DR.  Carmen Monroe NEED A RESPONSIBLE ADULT AGE 18 OR OLDER TO DRIVE YOU HOME  PLEASE BRING INSURANCE CARD. PICTURE ID AND COMPLETE LIST OF MEDS  WEAR LOOSE COMFORTABLE CLOTHING  FOLLOW ANY INSTRUCTIONS YOUR D[facility] OFFICE HAS GIVEN YOU,INCLUDING WHAT MEDICATIONS TO TAKE THE AM OF PROCEDURE AND WHEN AND IF YOU NEED TO STOP ANY BLOOD THINNERS. IF YOU HAVE QUESTIONS REGARDING THIS CALL THE OFFICE  THE GOAL BLOOD SUGAR THE AM OF PROCEDURE  OR LESS ABOVE THAT THE PROCEDURE MAY BE CANCELLED  ANY QUESTIONS CALL YOUR DOCTOR. ALSO,PLEASE READ THE INSTRUCTION PACKET FROM YOUR DR IF YOU RECEIVED ONE.   SPINE INTERVENTION NUMBER [id number]

## 2020-07-02 ENCOUNTER — OFFICE VISIT (OUTPATIENT)
Dept: PRIMARY CARE CLINIC | Age: 80
End: 2020-07-02
Payer: MEDICARE

## 2020-07-02 PROCEDURE — 99211 OFF/OP EST MAY X REQ PHY/QHP: CPT | Performed by: NURSE PRACTITIONER

## 2020-07-02 NOTE — PATIENT INSTRUCTIONS
Steps to help prevent the spread of COVID-19 if you are sick  SOURCE - https://diez-edward.info/. html     Stay home except to get medical care   ; Stay home: People who are mildly ill with COVID-19 are able to isolate at home during their illness. You should restrict activities outside your home, except for getting medical care.   ; Avoid public areas: Do not go to work, school, or public areas.   ; Avoid public transportation: Avoid using public transportation, ride-sharing, or taxis.  ; Separate yourself from other people and animals in your home   ; Stay away from others: As much as possible, you should stay in a specific room and away from other people in your home. Also, you should use a separate bathroom, if available.   ; Limit contact with pets & animals: You should restrict contact with pets and other animals while you are sick with COVID-19, just like you would around other people. Although there have not been reports of pets or other animals becoming sick with COVID-19, it is still recommended that people sick with COVID-19 limit contact with animals until more information is known about the virus. ; When possible, have another member of your household care for your animals while you are sick. If you are sick with COVID-19, avoid contact with your pet, including petting, snuggling, being kissed or licked, and sharing food. If you must care for your pet or be around animals while you are sick, wash your hands before and after you interact with pets and wear a facemask. See COVID-19 and Animals for more information. Other considerations   The ill person should eat/be fed in their room if possible. Non-disposable  items used should be handled with gloves and washed with hot water or in a . Clean hands after handling used  items.  If possible, dedicate a lined trash can for the ill person.  Use gloves when removing garbage bags, handling, and disposing of trash. Wash hands after handling or disposing of trash.  Consider consulting with your local health department about trash disposal guidance if available. Information for Household Members and Caregivers of Someone who is Sick   Call ahead before visiting your doctor   Call ahead: If you have a medical appointment, call the healthcare provider and tell them that you have or may have COVID-19. This will help the healthcare provider's office take steps to keep other people from getting infected or exposed. Wear a facemask if you are sick   ; If you are sick: You should wear a facemask when you are around other people (e.g., sharing a room or vehicle) or pets and before you enter a healthcare provider's office. ; If you are caring for others: If the person who is sick is not able to wear a facemask (for example, because it causes trouble breathing), then people who live with the person who is sick should not stay in the same room with them, or they should wear a facemask if they enter a room with the person who is sick. Cover your coughs and sneezes   ; Cover: Cover your mouth and nose with a tissue when you cough or sneeze.   ; Dispose: Throw used tissues in a lined trash can.   ; Wash hands: Immediately wash your hands with soap and water for at least 20 seconds or, if soap and water are not available, clean your hands with an alcohol-based hand  that contains at least 60% alcohol. Clean your hands often   ;  Wash hands: Wash your hands often with soap and water for at least 20 seconds, especially after blowing your nose, coughing, or sneezing; going to the bathroom; and before eating or preparing food.   ; Hand : If soap and water are not readily available, use an alcohol-based hand  with at least 60% alcohol, covering all surfaces of your hands and rubbing them together until they feel dry.   ; Soap and water: Soap and water are the best option if hands are visibly dirty.   ; Avoid touching: Avoid touching your eyes, nose, and mouth with unwashed hands. Handwashing Tips   ; Wet your hands with clean, running water (warm or cold), turn off the tap, and apply soap.  ; Lather your hands by rubbing them together with the soap. Lather the backs of your hands, between your fingers, and under your nails. ; Scrub your hands for at least 20 seconds. Need a timer? Hum the [place] from beginning to end twice.  ; Rinse your hands well under clean, running water.  ; Dry your hands using a clean towel or air dry them. Avoid sharing personal household items   ; Do not share: You should not share dishes, drinking glasses, cups, eating utensils, towels, or bedding with other people or pets in your home.   ; Wash thoroughly after use: After using these items, they should be washed thoroughly with soap and water. Clean all high-touch surfaces everyday   ; Clean and disinfect: Practice routine cleaning of high touch surfaces.  ; High touch surfaces include counters, tabletops, doorknobs, bathroom fixtures, toilets, phones, keyboards, tablets, and bedside tables.  ; Disinfect areas with bodily fluids: Also, clean any surfaces that may have blood, stool, or body fluids on them.   ; Household : Use a household cleaning spray or wipe, according to the label instructions. Labels contain instructions for safe and effective use of the cleaning product including precautions you should take when applying the product, such as wearing gloves and making sure you have good ventilation during use of the product.     Monitor your symptoms   Seek medical attention: Seek prompt medical attention if your illness is worsening     (e.g., difficulty breathing).   ; Call your doctor: Before seeking care, call your healthcare provider and tell them that you have, or are being evaluated for, COVID-19.   ; Wear a facemask when sick: Put on a facemask before you enter the facility. These steps will help the healthcare provider's office to keep other people in the office or waiting room from getting infected or exposed. ; Alert health department: Ask your healthcare provider to call the local or state health department. Persons who are placed under active monitoring or facilitated self-monitoring should follow instructions provided by their local health department or occupational health professionals, as appropriate.  ; Call 911 if you have a medical emergency: If you have a medical emergency and need to call 911, notify the dispatch personnel that you have, or are being evaluated for COVID-19. If possible, put on a facemask before emergency medical services arrive.

## 2020-07-02 NOTE — PROGRESS NOTES
Yaneth Zambrano received a viral test for COVID-19. They were educated on isolation and quarantine as appropriate. For any symptoms, they were directed to seek care from their PCP, given contact information to establish with a doctor, directed to an urgent care or the emergency room.

## 2020-07-05 LAB
SARS-COV-2: NOT DETECTED
SOURCE: NORMAL

## 2020-07-08 ENCOUNTER — HOSPITAL ENCOUNTER (OUTPATIENT)
Age: 80
Setting detail: OUTPATIENT SURGERY
Discharge: HOME | End: 2020-07-08
Attending: PHYSICAL MEDICINE & REHABILITATION | Admitting: PHYSICAL MEDICINE & REHABILITATION
Payer: MEDICARE

## 2020-07-08 ENCOUNTER — APPOINTMENT (OUTPATIENT)
Dept: GENERAL RADIOLOGY | Age: 80
End: 2020-07-08
Attending: PHYSICAL MEDICINE & REHABILITATION
Payer: MEDICARE

## 2020-07-08 VITALS
DIASTOLIC BLOOD PRESSURE: 53 MMHG | HEIGHT: 61 IN | WEIGHT: 122 LBS | BODY MASS INDEX: 23.03 KG/M2 | RESPIRATION RATE: 16 BRPM | OXYGEN SATURATION: 98 % | HEART RATE: 52 BPM | SYSTOLIC BLOOD PRESSURE: 155 MMHG | TEMPERATURE: 97.4 F

## 2020-07-08 PROCEDURE — 3209999900 FLUORO FOR SURGICAL PROCEDURES

## 2020-07-08 PROCEDURE — 2500000003 HC RX 250 WO HCPCS: Performed by: PHYSICAL MEDICINE & REHABILITATION

## 2020-07-08 PROCEDURE — 3610000058 HC PAIN LEVEL 5 BASE (NON-OR): Performed by: PHYSICAL MEDICINE & REHABILITATION

## 2020-07-08 PROCEDURE — 2709999900 HC NON-CHARGEABLE SUPPLY: Performed by: PHYSICAL MEDICINE & REHABILITATION

## 2020-07-08 RX ORDER — BUPIVACAINE HYDROCHLORIDE 5 MG/ML
INJECTION, SOLUTION EPIDURAL; INTRACAUDAL
Status: COMPLETED | OUTPATIENT
Start: 2020-07-08 | End: 2020-07-08

## 2020-07-08 RX ORDER — ASPIRIN 81 MG/1
81 TABLET ORAL DAILY
COMMUNITY

## 2020-07-08 RX ORDER — LIDOCAINE HYDROCHLORIDE 10 MG/ML
INJECTION, SOLUTION EPIDURAL; INFILTRATION; INTRACAUDAL; PERINEURAL
Status: COMPLETED | OUTPATIENT
Start: 2020-07-08 | End: 2020-07-08

## 2020-07-08 ASSESSMENT — PAIN - FUNCTIONAL ASSESSMENT: PAIN_FUNCTIONAL_ASSESSMENT: 0-10

## 2020-07-08 ASSESSMENT — PAIN DESCRIPTION - DESCRIPTORS: DESCRIPTORS: ACHING;OTHER (COMMENT)

## 2020-07-08 ASSESSMENT — PAIN SCALES - GENERAL: PAINLEVEL_OUTOF10: 0

## 2020-07-08 NOTE — OP NOTE
Patient:  Devante Virgen  YOB: 1940  Medical Record #:  [id number]   Place:   [address] [place] [address]  Date:  7/8/2020   Physician:  Conor Clemente MD, TAMARA    Procedure: Lumbar Medial Branch Blocks - Bilateral L1, L2, L3    MBB #1    CPT (62454 Modifier [id number] Modifier 50)      Pre-Procedure Diagnosis: Lumbar spondylosis without radiculopathy      Post-Procedure Diagnosis: Same      Sedation: Local with 1% Lidocaine 5 ml      EBL: None      Complications: None      Procedure Summary:      The patient was seen in the office for complaints of low back pain. Review of the imaging and physical exam of the patient confirmed the pre-procedure diagnosis. After a thorough discussion of risks, benefits and alternatives informed consent was obtained. The patient was brought to the procedure suite and placed in the prone position. The skin overlying the lumbar spine was prepped with chloraprep and draped in the usual sterile fashion. Using fluoroscopic guidance, the right L2, L3, L4 levels were identified. Through anesthetized skin a 22 gauge 3.5 inch curved tip spinal needle was advanced to the juncture of the superior articular process and the transverse process at the right L2 level where the right L1 medial branch resides. .3 ml of Isovue M 300 was instilled showing a nerve root outline pattern, without evidence of vascular spread. A total of 0.5 ml of 0.5 % Marcaine was instilled at the right L2 level corresponding to the right L1 medial branch. This was repeated for the right L3 and L4 levels corresponding to the right L3 and L4 medial branches. The identical procedure was repeated on the left side. The needles were removed and a band-aid applied. The patient was transferred to the post-operative area in stable condition.

## 2020-07-08 NOTE — H&P
tablet Take 1 tablet by mouth 2 times daily as needed for Pain 2/24/20  Yes Calvin Escobar MD   propranolol (INDERAL) 40 MG tablet TAKE 1 TABLET TWICE A DAY 1/17/20  Yes YOSEF Taylor CNP   albuterol sulfate HFA (PROVENTIL HFA) 108 (90 Base) MCG/ACT inhaler Inhale 2 puffs into the lungs every 6 hours as needed for Wheezing Any inhaler insurance will cover 11/12/19  Yes Calvin Escobar MD   alendronate (FOSAMAX) 70 MG tablet TAKE 1 TABLET BY MOUTH ONE TIME PER WEEK 10/31/19  Yes YOSEF Hernandez CNP   simvastatin (ZOCOR) 20 MG tablet TAKE 1 TABLET NIGHTLY 10/7/19  Yes Calvin Escobar MD   lisinopril (PRINIVIL;ZESTRIL) 40 MG tablet TAKE 1 TABLET BY MOUTH EVERY DAY 6/3/19  Yes Fidelina Santana DO   docusate sodium (COLACE) 100 MG capsule Take 1 capsule by mouth 2 times daily as needed for Constipation 4/22/19  Yes Batsheva Bustos PA-C   vitamin B-12 (CYANOCOBALAMIN) 1000 MCG tablet Take 1,000 mcg by mouth daily   Yes Historical Provider, MD   cetirizine (ZYRTEC) 10 MG tablet Take 10 mg by mouth daily   Yes Historical Provider, MD   Flaxed, Linseed, (FLAXSEED OIL) 1000 MG CAPS Take 1 tablet by mouth daily   Yes Historical Provider, MD   Glucosamine-Chondroit-Vit C-Mn (GLUCOSAMINE-CHONDROITIN) TABS Take 1 tablet by mouth 2 times daily. Yes Historical Provider, MD   tiZANidine (ZANAFLEX) 2 MG tablet Take 1 tablet by mouth 3 times daily as needed (pain) 5/21/19   Calvin Escobar MD   Multiple Vitamins-Minerals [facility] ADULT 50+) CAPS Take 1 capsule by mouth daily    Historical Provider, MD     Allergies:  Levofloxacin and Penicillins  Social History:    reports that she has been smoking cigarettes. She has a 50.00 pack-year smoking history. She has never used smokeless tobacco. She reports current alcohol use. She reports that she does not use drugs.   Family History:   Family History   Problem Relation Age of Onset    Heart Disease Mother     Stroke Mother    [facility] Heart Disease Father     High Blood Pressure Father     Mental Illness Father        Vitals: Blood pressure (!) 136/49, pulse 50, temperature 97.4 °F (36.3 °C), temperature source Temporal, resp. rate 14, height 5' 1\" (1.549 m), weight 122 lb (55.3 kg), SpO2 99 %. PHYSICAL EXAM:including affected areas  HENT: Airway patent and reviewed  Cardiovascular: Normal rate, regular rhythm, normal heart sounds. Pulmonary/Chest: No wheezes. No rhonchi. No rales. Abdominal: Soft. Bowel sounds are normal. No distension. Extremities: Moves all extremities equally  Cervical and Lumbar Spine: Painful range of motion, no midline tenderness       Diagnosis:Lumbar spondylosis without radiculopathy  M47.816  M48.061  S32.010A    Plan: Proceed with planned procedure      ASA CLASS:         []   I. Normal, healthy adult           [x]   II.  Mild systemic disease            []   III. Severe systemic disease      Mallampati: Mallampati Class II - (soft palate, fauces & uvula are visible)      Sedation plan:   [x]  Local              []  Minimal                  []  General anesthesia    Patient's condition acceptable for planned procedure/sedation. Post Procedure Plan   Return to same level of care   ______________________     The patient was counseled at length about the risks of nia Covid-19 in the lexie-operative and post-operative states including the recovery window of their procedure. The patient was made aware that nia Covid-19 after a surgical procedure may worsen their prognosis for recovering from the virus and lend to a higher morbidity and or mortality risk. The patient was given the options of postponing their procedure. All of the risks, benefits, and alternatives were discussed. The patient does wish to proceed with the procedure. The risks and benefits as well as alternatives to the procedure have been discussed with the patient and or family.   The patient and or next of kin understands and agrees to proceed.     Johann Blackwell M.D.

## 2020-07-08 NOTE — H&P
HISTORY AND PHYSICAL/PRE-SEDATION ASSESSMENT    Patient:  Casandra Carmichael   :  1940  Medical Record No.:  [id number]   Date:  2020  Physician:  Abiodun Rosenthal M.D. Facility: [address] [place] [address]    HISTORY OF PRESENT ILLNESS:                 The patient is a [de-identified] y.o. female whom presents with low back pain. Review of the imaging and physical exam of the patient confirmed the pre-procedure diagnosis. After a thorough discussion of risks, benefits and alternatives informed consent was obtained. Past Medical History:   Past Medical History:   Diagnosis Date    Asthma     Breast cyst     Essential tremor     HTN (hypertension)     Hyperlipidemia     Medical history reviewed with no changes     Ovarian cyst     Pneumonia     Stroke [facility])     mini      Past Surgical History:     Past Surgical History:   Procedure Laterality Date    CATARACT REMOVAL       SECTION      HYSTERECTOMY  1981    INNER EAR SURGERY  2007    PAIN MANAGEMENT PROCEDURE Right 2020    RIGHT L1 AND L2 TRANSFORAMINAL EPIDURAL STEROID INJECTION WITH FLUOROSCOPY (78072, 18812) performed by Abiodun Rosenthal MD at [address] Right 2020    RIGHT L1 AND L2 TRANSFORAMINAL EPIDURAL STEROID INJECTION WITH FLUOROSCOPY (48813, 10572) performed by Abiodun Rosenthal MD at [facility]     Current Medications:   Prior to Admission medications    Medication Sig Start Date End Date Taking?  Authorizing Provider   aspirin 81 MG EC tablet Take 81 mg by mouth daily   Yes Historical Provider, MD   amLODIPine (NORVASC) 5 MG tablet TAKE 1 TABLET EVERY DAY 20  Yes Sudha Harris APRN - CNP   sertraline (ZOLOFT) 100 MG tablet TAKE 1 TABLET BY MOUTH EVERY DAY 20  Yes Rhea Chin MD   hydrochlorothiazide (HYDRODIURIL) 12.5 MG tablet Take 1 tablet by mouth daily as needed (swelling) 20  Yes YOSEF Taylor - CNP   naproxen (NAPROSYN) 500 MG tablet Take 1 tablet by mouth 2 times daily as needed for Pain 2/24/20  Yes Deandra Parikh MD   propranolol (INDERAL) 40 MG tablet TAKE 1 TABLET TWICE A DAY 1/17/20  Yes YOSEF Taylor CNP   albuterol sulfate HFA (PROVENTIL HFA) 108 (90 Base) MCG/ACT inhaler Inhale 2 puffs into the lungs every 6 hours as needed for Wheezing Any inhaler insurance will cover 11/12/19  Yes Deandra Parikh MD   alendronate (FOSAMAX) 70 MG tablet TAKE 1 TABLET BY MOUTH ONE TIME PER WEEK 10/31/19  Yes YOSEF Porter CNP   simvastatin (ZOCOR) 20 MG tablet TAKE 1 TABLET NIGHTLY 10/7/19  Yes Deandra Parikh MD   lisinopril (PRINIVIL;ZESTRIL) 40 MG tablet TAKE 1 TABLET BY MOUTH EVERY DAY 6/3/19  Yes Rhett Huizar DO   docusate sodium (COLACE) 100 MG capsule Take 1 capsule by mouth 2 times daily as needed for Constipation 4/22/19  Yes Aicha Bustos PA-C   vitamin B-12 (CYANOCOBALAMIN) 1000 MCG tablet Take 1,000 mcg by mouth daily   Yes Historical Provider, MD   cetirizine (ZYRTEC) 10 MG tablet Take 10 mg by mouth daily   Yes Historical Provider, MD   Flaxed, Linseed, (FLAXSEED OIL) 1000 MG CAPS Take 1 tablet by mouth daily   Yes Historical Provider, MD   Glucosamine-Chondroit-Vit C-Mn (GLUCOSAMINE-CHONDROITIN) TABS Take 1 tablet by mouth 2 times daily. Yes Historical Provider, MD   tiZANidine (ZANAFLEX) 2 MG tablet Take 1 tablet by mouth 3 times daily as needed (pain) 5/21/19   Deandra Parikh MD   Multiple Vitamins-Minerals [facility] ADULT 50+) CAPS Take 1 capsule by mouth daily    Historical Provider, MD     Allergies:  Levofloxacin and Penicillins  Social History:    reports that she has been smoking cigarettes. She has a 50.00 pack-year smoking history. She has never used smokeless tobacco. She reports current alcohol use. She reports that she does not use drugs.   Family History:   Family History   Problem Relation Age of Onset    Heart Disease Mother     Stroke Mother    Mak Ambrosio Heart Disease Father     High Blood Pressure Father     Mental Illness Father        Vitals: Blood pressure (!) 136/49, pulse 50, temperature 97.4 °F (36.3 °C), temperature source Temporal, resp. rate 14, height 5' 1\" (1.549 m), weight 122 lb (55.3 kg), SpO2 99 %. PHYSICAL EXAM:including affected areas  HENT: Airway patent and reviewed  Cardiovascular: Normal rate, regular rhythm, normal heart sounds. Pulmonary/Chest: No wheezes. No rhonchi. No rales. Abdominal: Soft. Bowel sounds are normal. No distension. Extremities: Moves all extremities equally  Cervical and Lumbar Spine: Painful range of motion, no midline tenderness       Diagnosis:Lumbar spondylosis without radiculopathy  M47.816  M48.061  S32.010A    Plan: Proceed with planned procedure      ASA CLASS:         []   I. Normal, healthy adult           [x]   II.  Mild systemic disease            []   III. Severe systemic disease      Mallampati: Mallampati Class II - (soft palate, fauces & uvula are visible)      Sedation plan:   [x]  Local              []  Minimal                  []  General anesthesia    Patient's condition acceptable for planned procedure/sedation. Post Procedure Plan   Return to same level of care   ______________________     The patient was counseled at length about the risks of nia Covid-19 in the lexie-operative and post-operative states including the recovery window of their procedure. The patient was made aware that nia Covid-19 after a surgical procedure may worsen their prognosis for recovering from the virus and lend to a higher morbidity and or mortality risk. The patient was given the options of postponing their procedure. All of the risks, benefits, and alternatives were discussed. The patient does wish to proceed with the procedure. The risks and benefits as well as alternatives to the procedure have been discussed with the patient and or family.   The patient and or next of kin understands and agrees to proceed.     Rylee Pierre M.D.

## 2020-07-16 ENCOUNTER — OFFICE VISIT (OUTPATIENT)
Dept: ORTHOPEDIC SURGERY | Age: 80
End: 2020-07-16
Payer: MEDICARE

## 2020-07-16 VITALS — RESPIRATION RATE: 12 BRPM | BODY MASS INDEX: 23.03 KG/M2 | TEMPERATURE: 97.2 F | WEIGHT: 122 LBS | HEIGHT: 61 IN

## 2020-07-16 PROCEDURE — 99213 OFFICE O/P EST LOW 20 MIN: CPT | Performed by: PHYSICIAN ASSISTANT

## 2020-07-16 NOTE — PROGRESS NOTES
Follow up: Gibran  1940  [id number]         Chief Complaint   Patient presents with    Lower Back Pain     F/U MBB B/L L1, L2, L3 7/8         HISTORY OF PRESENT ILLNESS:  Ms. Alfredo Landa is a [de-identified] y.o. female returns for a follow up visit for multiple medical problems. Her current presenting problems are   1. Spondylosis without myelopathy or radiculopathy, lumbar region    2. Lumbar foraminal stenosis    3. Lumbar stenosis with neurogenic claudication    . As per information/history obtained from the PADT(patient assessment and documentation tool) - She complains of pain in the lower back with radiation to the buttocks She rates the pain 2/10 and describes it as aching. Pain is made worse by: walking, standing, bending. She denies side effects from the current pain regimen. Patient reports that since the last follow up visit the physical functioning is better, family/social relationships are better, mood is better and sleep patterns are better, and that the overall functioning is better. Patient denies neurological bowel or bladder. The patient presents today in follow-up after a bilateral L1, L2, L3 MBB #1 was completed on 7/8/2020. The patient describes that the day of the procedure she received 80% relief for 4-5 hours. The patient describes that she is currently still 50% improved following the injection. The patient describes that she can do a lot more activity however she would like to complete the second medial branch block so that she can have the radiofrequency ablation completed. She describes that walking, bending, and standing increases her pain and sitting improves her pain. She can sit for an unlimited period of time, stand for 10-15 minutes, and walk for 10-15 minutes without pain. The patient presents today in the office with her daughter.       Associated signs and symptoms:   Neurogenic bowel or bladder symptoms:  no   Perceived weakness:  no   Difficulty walking: no            Past medical, surgical, social and family history reviewed with the patient.      Past Medical History:   Past Medical History:   Diagnosis Date    Asthma     Breast cyst     Essential tremor     HTN (hypertension)     Hyperlipidemia     Medical history reviewed with no changes     Ovarian cyst     Pneumonia 2000    Stroke [facility])     mini      Past Surgical History:     Past Surgical History:   Procedure Laterality Date    CATARACT REMOVAL  2012     SECTION      HYSTERECTOMY  1981    INNER EAR SURGERY  2007    PAIN MANAGEMENT PROCEDURE Right 2020    RIGHT L1 AND L2 TRANSFORAMINAL EPIDURAL STEROID INJECTION WITH FLUOROSCOPY (50370, 07202) performed by Elen Downs MD at [address] Right 2020    RIGHT L1 AND L2 TRANSFORAMINAL EPIDURAL STEROID INJECTION WITH FLUOROSCOPY (55151, 38052) performed by Elen Downs MD at [address] Bilateral 2020    BILATERAL L1, L2, L3 MEDIAL BRANCH BLOCK WITH FLUOROSCOPY (80083, 07828)  #1 performed by Elen Downs MD at [facility]     Current Medications:     Current Outpatient Medications:     aspirin 81 MG EC tablet, Take 81 mg by mouth daily, Disp: , Rfl:     amLODIPine (NORVASC) 5 MG tablet, TAKE 1 TABLET EVERY DAY, Disp: 90 tablet, Rfl: 1    sertraline (ZOLOFT) 100 MG tablet, TAKE 1 TABLET BY MOUTH EVERY DAY, Disp: 90 tablet, Rfl: 1    hydrochlorothiazide (HYDRODIURIL) 12.5 MG tablet, Take 1 tablet by mouth daily as needed (swelling), Disp: 30 tablet, Rfl: 3    naproxen (NAPROSYN) 500 MG tablet, Take 1 tablet by mouth 2 times daily as needed for Pain, Disp: 60 tablet, Rfl: 0    propranolol (INDERAL) 40 MG tablet, TAKE 1 TABLET TWICE A DAY, Disp: 180 tablet, Rfl: 4    albuterol sulfate HFA (PROVENTIL HFA) 108 (90 Base) MCG/ACT inhaler, Inhale 2 puffs into the lungs every 6 hours as needed for Wheezing Any inhaler insurance will cover, Disp: 1 Inhaler, Rfl: 1   alendronate (FOSAMAX) 70 MG tablet, TAKE 1 TABLET BY MOUTH ONE TIME PER WEEK, Disp: 12 tablet, Rfl: 3    simvastatin (ZOCOR) 20 MG tablet, TAKE 1 TABLET NIGHTLY, Disp: 90 tablet, Rfl: 3    lisinopril (PRINIVIL;ZESTRIL) 40 MG tablet, TAKE 1 TABLET BY MOUTH EVERY DAY, Disp: 90 tablet, Rfl: 3    tiZANidine (ZANAFLEX) 2 MG tablet, Take 1 tablet by mouth 3 times daily as needed (pain), Disp: 30 tablet, Rfl: 0    docusate sodium (COLACE) 100 MG capsule, Take 1 capsule by mouth 2 times daily as needed for Constipation, Disp: 30 capsule, Rfl: 0    vitamin B-12 (CYANOCOBALAMIN) 1000 MCG tablet, Take 1,000 mcg by mouth daily, Disp: , Rfl:     cetirizine (ZYRTEC) 10 MG tablet, Take 10 mg by mouth daily, Disp: , Rfl:     Multiple Vitamins-Minerals (OCUVITE ADULT 50+) CAPS, Take 1 capsule by mouth daily, Disp: , Rfl:     Flaxseed, Linseed, (FLAXSEED OIL) 1000 MG CAPS, Take 1 tablet by mouth daily, Disp: , Rfl:     Glucosamine-Chondroit-Vit C-Mn (GLUCOSAMINE-CHONDROITIN) TABS, Take 1 tablet by mouth 2 times daily. , Disp: , Rfl:   Allergies:  Levofloxacin and Penicillins  Social History:    reports that she has been smoking cigarettes. She has a 50.00 pack-year smoking history. She has never used smokeless tobacco. She reports current alcohol use. She reports that she does not use drugs. Family History:   Family History   Problem Relation Age of Onset    Heart Disease Mother     Stroke Mother     Heart Disease Father     High Blood Pressure Father     Mental Illness Father        REVIEW OF SYSTEMS:   CONSTITUTIONAL: Denies unexplained weight loss, fevers, chills or fatigue  NEUROLOGICAL: Denies unsteady gait or progressive weakness  MUSCULOSKELETAL: Denies joint swelling or redness  GI: Denies nausea, vomiting, diarrhea   : Denies bowel or bladder issues       PHYSICAL EXAM:    Vitals: Temperature 97.2 °F (36.2 °C), resp. rate 12, height 5' 1\" (1.549 m), weight 122 lb (55.3 kg).     GENERAL EXAM:  · General Apparence: Patient is adequately groomed with no evidence of malnutrition. · Psychiatric: Orientation: The patient is oriented to time, place and person. The patient's mood and affect are appropriate   · Vascular: Examination reveals no swelling and palpation reveals no tenderness in upper or lower extremities. Good capillary refill. · The lymphatic examination of the neck, axillae and groin reveals all areas to be without enlargement or induration  · Sensation is intact without deficit in the upper and lower extremities to light touch and pinprick  · Coordination of the upper and lower extremities are normal.  · RIGHT UPPER EXTREMITY:  Inspection/examination of the right upper extremity does not show any tenderness, deformity or injury. Range of motion is unremarkable and pain-free. There is no gross instability. There are no rashes, ulcerations or lesions. Strength and tone are normal. No atrophy or abnormal movements are noted. · LEFT UPPER EXTREMITY: Inspection/examination of the left upper extremity does not show any tenderness, deformity or injury. Range of motion is unremarkable and pain-free. There is no gross instability. There are no rashes, ulcerations or lesions. Strength and tone are normal. No atrophy or abnormal movements are noted. LUMBAR/SACRAL EXAMINATION:  · Inspection: Local inspection shows no step-off or bruising. Lumbar alignment is normal. No instability is noted. · Palpation:   No evidence of tenderness at the midline. Lumbar paraspinal tenderness: Mild L4/5 and L5/S1 tenderness  Bursal tenderness No tenderness bilaterally  There is no paraspinal spasm. · Range of Motion: limited by 50% in all planes due to pain specifically with extension and facet loading bilaterally. · Strength:   Strength testing is 5/5 in all muscle groups tested. · Special Tests:   Straight leg raise and crossed SLR negative. Bg's testing is negative bilaterally.  FADIR's testing is negative bilaterally. Bowstring test negative. Slump test negative. · Skin: There are no rashes, ulcerations or lesions. · Reflexes: Reflexes are symmetrically 2+ at the patellar and ankle tendons. Clonus absent bilaterally at the feet. · Gait & station: normal, patient ambulates without assistance and no ataxia  · Additional Examinations:  · RIGHT LOWER EXTREMITY: Inspection/examination of the right lower extremity does not show any tenderness, deformity or injury. Range of motion is normal and pain-free. There is no gross instability. There are no rashes, ulcerations or lesions. Strength and tone are normal. No atrophy or abnormal movements are noted. · LEFT LOWER EXTREMITY:  Inspection/examination of the left lower extremity does not show any tenderness, deformity or injury. Range of motion is normal and pain-free. There is no gross instability. There are no rashes, ulcerations or lesions. Strength and tone are normal. No atrophy or abnormal movements are noted. Diagnostic Testing:    CT Scan of the Lumbar Spine from 4/24/19:   FINDINGS:              DISC LEVELS:    T12-L1: Mild superior endplate compression fracture at L1 with 20% loss of vertebral height.      Involvement of the posterior cortex results in 3 mm of bony retropulsion causing slight central     canal narrowing.  No fractures in the posterior elements or    pedicles. L1-2:      Small central disc protrusion causing borderline central stenosis.  Anterior    osteophyte is noted. L2-3:      Mild noncompressive disc bulge    L3-4:      Diffuse mild bulge causing borderline canal stenosis with AP thecal sac dimension of     9 mm. L4-5:      Moderate disc narrowing with broad-based central and left foraminal disc bulge    causing mild left L4 foraminal stenosis.     L5-S1:    Right greater than left facet arthrosis with mild noncompressive disc bulge.      Moderate right and mild left L5 foraminal stenosis.         LUMBOSACRAL JUNCTION: Unremarkable    SACRUM AND SI JOINTS:  Unremarkable    OTHER:  None              IMPRESSION:              Mild acute appearing superior endplate compression fracture at L1 without significant central    canal compromise      Results for orders placed or performed in visit on 20   COVID-19 Ambulatory    Specimen: Nasopharyngeal Swab   Result Value Ref Range    SARS-CoV-2 Not Detected Not Detected    Source NP swab      Impression:       1. Spondylosis without myelopathy or radiculopathy, lumbar region    2. Lumbar foraminal stenosis    3. Lumbar stenosis with neurogenic claudication        Plan:  Clinical Course: Above diagnoses are improving     I discussed the diagnosis and the treatment options with Misaelperlita Effie today. In Summary:  The various treatment options were outlined and discussed with Misaelperlita Effie including:  Conservative care options: physical therapy, ice, medications, bracing, and activity modification. The indications for therapeutic injections. The indications for additional imaging/laboratory studies. The indications for (possible future) interventions. After considering the various options discussed, Cate Chou elected to pursue a course of treatment that includes the followin. Medications:  No further recommendations for new medications. 2. PT:  Encouraged to continue with Home exercise program.    3. Further studies: No further studies. 4. Interventional:  We discussed pursuing lumbar medial branch blocks for diagnostic purposes. Based on physical exam findings of increased pain with facet loading and radiologic imaging, we will pursue lumbar medial branch blocks at the Bilateral L1, L2, L3  levels. Risks, benefits and alternatives were discussed. These include but are not limited to bleeding, infection, increased pain, lack of pain relief and nerve injury. The patient verbalized understanding and would like to proceed.  If there is significant pain relief and functional improvement, the patient may be a good candidate for Radiofrequency ablation. MBB #2. As such, I have confirmed the patient has met the general requirements including failure of conservative management including prescription strength analgesics, adjunctive medications were utilized , therapeutic exercise program, and no underlying addiction or behavioral disorders were identified to the ability of the provider. Symptoms are impacting their ADLs or iADLs such as walking and significant pain was noted in the HPI. Imaging studies as noted in the diagnostic imaging section of the consultation were reviewed and correlated with clinical findings. Fluoroscopy is utilized for interventional procedures. For second Diagnostic MBB  A confirmatory injection is being requested due to a positive response of 80% relief of the primary index pain with onset and duration of relief being consistent with local anesthetic utilized. 5. Follow up:  4-6 weeks      Brittnee Elena was instructed to call the office if her symptoms worsen or if new symptoms appear prior to the next scheduled visit. She is specifically instructed to contact the office between now & her scheduled appointment if she has concerns related to her condition or if she needs assistance in scheduling the above tests. She is welcome to call for an appointment sooner if she has any additional concerns or questions. PASQUALE Nayak, PA-C  Board Certified by the [employer] on Certification of Physician Assistants  Jerrod Solorzano 57  Partner of [facility] ([facility])      This dictation was performed with a verbal recognition program [employer] ) and it was checked for errors. It is possible that there are still dictated errors within this office note. If so, please bring any errors to my attention for an addendum. All efforts were made to ensure that this office note is accurate.

## 2020-07-16 NOTE — LETTER
Please schedule the following with:     Date:   20 @ 9:45    Account: [id number]  Patient: Julia Coates    : 1940  Address:  [address]    Phone (H):  [phone] (home)      ----------------------------------------------------------------------------------------------  Diagnosis:     ICD-10-CM    1. Spondylosis without myelopathy or radiculopathy, lumbar region  M47.816    2. Lumbar foraminal stenosis  M48.061    3. Lumbar stenosis with neurogenic claudication  M48.062      Procedure: Medial Branch Block Lumbar #2    Levels: L1, L2, L3   Side: Bilateral   CPT Codes [id number], 94885    ----------------------------------------------------------------------------------------------  Injection # 2  ASC    Attending Physician       Estella Page.  Legrand Kanner, MD.      ----------------------------------------------------------------------------------------------  Injection Scheduled For:    At:    1st Insurance [employer] MCR ADVANTAGE  Pre-Cert#    2nd Insurance     Pre-Cert#    Comments:    · Infection control  · Tested positive for MRSA in past 12 months:  no  · Tested positive for MSSA \"staph infection\" in past 12 months: no  · Tested positive for VRE (Vancomycin Resistant Enterococci) in past 12 months:   no  · Currently on any antibiotics for an infection: no  · Anticoagulants:  · On a blood thinner:  Yes, ASA 81 mg    · Any history of bleeding disorder: no   · Advanced Liver disease: no   · Advanced Renal disease: no   · Glaucoma: no   · Diabetes: no     Sedation:  Yes  -----------------------------------------------------------------------------------------------  Allergies   Allergen Reactions    Levofloxacin Anxiety    Penicillins Swelling

## 2020-07-17 ENCOUNTER — TELEPHONE (OUTPATIENT)
Dept: ORTHOPEDIC SURGERY | Age: 80
End: 2020-07-17

## 2020-07-17 NOTE — TELEPHONE ENCOUNTER
Auth: # [id number]    Date: 7/16/2020 thru 1/12/2021  Type of SX:  Outpatient  Location: [facility]  CPT: 26712, 10681   DX Code: M47.816, M48.061, M48.062  SX area: Lumbar spine  Insurance: [employer] Medicare     CPT: [address] [facility], [address], [id number]

## 2020-07-17 NOTE — PROGRESS NOTES
PATIENT REACHED   YES____NO_X___    PREOP INSTUCTIONS LEFT ON  RYJYAB__[phone]_____________  Patient instructed to get their COVID-19 test done as directed by their doctor (5-7 days prior to procedure)  or patient states will get on _7/21 RECOMMENDED_________. I The day the COVID test is done is considered day one. Instructed to self quarantine after test until DOS. There is a one visitor policy at [facility] for the pre-op phase only for surgery and endoscopy cases. The visitor is expected to leave the facility after the patient is taken back for the procedure. Pain management is NO VISITOR policyThe patients ride is expected to remain in the car with a cell phone for communication. If the ride is leaving the hospital grounds please make sure they are back in time for pickup. Have the patient inform the staff on arrival what their rides plans are while the patient is in the facility. At the MAIN there is one visitor allowed. Please note that the visitor policy is subject to change. DATE_7/27/20________ TIME__1130_______ARRIVAL_1030_______PLACE__MASC__________  NOTHING TO EAT OR DRINK  AFTER MIDNIGHT THE EVENING PRIOR OR AS INSTRUCTED BY YOUR DR.  Sunitha Pierson NEED A RESPONSIBLE ADULT AGE 18 OR OLDER TO DRIVE YOU HOME  PLEASE BRING INSURANCE CARD. PICTURE ID AND COMPLETE LIST OF MEDS  WEAR LOOSE COMFORTABLE CLOTHING  FOLLOW ANY INSTRUCTIONS YOUR DRS OFFICE HAS GIVEN YOU,INCLUDING WHAT MEDICATIONS TO TAKE THE AM OF PROCEDURE AND WHEN AND IF YOU NEED TO STOP ANY BLOOD THINNERS. IF YOU HAVE QUESTIONS REGARDING THIS CALL THE OFFICE  THE GOAL BLOOD SUGAR THE AM OF PROCEDURE  OR LESS ABOVE THAT THE PROCEDURE MAY BE CANCELLED  ANY QUESTIONS CALL YOUR DOCTOR. ALSO,PLEASE READ THE INSTRUCTION PACKET FROM YOUR DR IF YOU RECEIVED ONE.   SPINE INTERVENTION NUMBER [phone]

## 2020-07-21 ENCOUNTER — OFFICE VISIT (OUTPATIENT)
Dept: PRIMARY CARE CLINIC | Age: 80
End: 2020-07-21
Payer: MEDICARE

## 2020-07-21 PROCEDURE — 99211 OFF/OP EST MAY X REQ PHY/QHP: CPT | Performed by: NURSE PRACTITIONER

## 2020-07-21 RX ORDER — HYDROCHLOROTHIAZIDE 12.5 MG/1
TABLET ORAL
Qty: 90 TABLET | Refills: 1 | Status: SHIPPED | OUTPATIENT
Start: 2020-07-21 | End: 2021-01-18

## 2020-07-21 NOTE — PATIENT INSTRUCTIONS
Steps to help prevent the spread of COVID-19 if you are sick  SOURCE - https://diez-edward.info/. html     Stay home except to get medical care   ; Stay home: People who are mildly ill with COVID-19 are able to isolate at home during their illness. You should restrict activities outside your home, except for getting medical care.   ; Avoid public areas: Do not go to work, school, or public areas.   ; Avoid public transportation: Avoid using public transportation, ride-sharing, or taxis.  ; Separate yourself from other people and animals in your home   ; Stay away from others: As much as possible, you should stay in a specific room and away from other people in your home. Also, you should use a separate bathroom, if available.   ; Limit contact with pets & animals: You should restrict contact with pets and other animals while you are sick with COVID-19, just like you would around other people. Although there have not been reports of pets or other animals becoming sick with COVID-19, it is still recommended that people sick with COVID-19 limit contact with animals until more information is known about the virus. ; When possible, have another member of your household care for your animals while you are sick. If you are sick with COVID-19, avoid contact with your pet, including petting, snuggling, being kissed or licked, and sharing food. If you must care for your pet or be around animals while you are sick, wash your hands before and after you interact with pets and wear a facemask. See COVID-19 and Animals for more information. Other considerations   The ill person should eat/be fed in their room if possible. Non-disposable  items used should be handled with gloves and washed with hot water or in a . Clean hands after handling used  items.  If possible, dedicate a lined trash can for the ill person.  Use gloves when removing garbage bags, handling, and disposing of trash. Wash hands after handling or disposing of trash.  Consider consulting with your local health department about trash disposal guidance if available. Information for Household Members and Caregivers of Someone who is Sick   Call ahead before visiting your doctor   Call ahead: If you have a medical appointment, call the healthcare provider and tell them that you have or may have COVID-19. This will help the healthcare provider's office take steps to keep other people from getting infected or exposed. Wear a facemask if you are sick   ; If you are sick: You should wear a facemask when you are around other people (e.g., sharing a room or vehicle) or pets and before you enter a healthcare provider's office. ; If you are caring for others: If the person who is sick is not able to wear a facemask (for example, because it causes trouble breathing), then people who live with the person who is sick should not stay in the same room with them, or they should wear a facemask if they enter a room with the person who is sick. Cover your coughs and sneezes   ; Cover: Cover your mouth and nose with a tissue when you cough or sneeze.   ; Dispose: Throw used tissues in a lined trash can.   ; Wash hands: Immediately wash your hands with soap and water for at least 20 seconds or, if soap and water are not available, clean your hands with an alcohol-based hand  that contains at least 60% alcohol. Clean your hands often   ;  Wash hands: Wash your hands often with soap and water for at least 20 seconds, especially after blowing your nose, coughing, or sneezing; going to the bathroom; and before eating or preparing food.   ; Hand : If soap and water are not readily available, use an alcohol-based hand  with at least 60% alcohol, covering all surfaces of your hands and rubbing them together until they feel dry.   ; Soap and water: Soap and water are the best option if hands are visibly dirty.   ; Avoid touching: Avoid touching your eyes, nose, and mouth with unwashed hands. Handwashing Tips   ; Wet your hands with clean, running water (warm or cold), turn off the tap, and apply soap.  ; Lather your hands by rubbing them together with the soap. Lather the backs of your hands, between your fingers, and under your nails. ; Scrub your hands for at least 20 seconds. Need a timer? Hum the [place] from beginning to end twice.  ; Rinse your hands well under clean, running water.  ; Dry your hands using a clean towel or air dry them. Avoid sharing personal household items   ; Do not share: You should not share dishes, drinking glasses, cups, eating utensils, towels, or bedding with other people or pets in your home.   ; Wash thoroughly after use: After using these items, they should be washed thoroughly with soap and water. Clean all high-touch surfaces everyday   ; Clean and disinfect: Practice routine cleaning of high touch surfaces.  ; High touch surfaces include counters, tabletops, doorknobs, bathroom fixtures, toilets, phones, keyboards, tablets, and bedside tables.  ; Disinfect areas with bodily fluids: Also, clean any surfaces that may have blood, stool, or body fluids on them.   ; Household : Use a household cleaning spray or wipe, according to the label instructions. Labels contain instructions for safe and effective use of the cleaning product including precautions you should take when applying the product, such as wearing gloves and making sure you have good ventilation during use of the product.     Monitor your symptoms   Seek medical attention: Seek prompt medical attention if your illness is worsening     (e.g., difficulty breathing).   ; Call your doctor: Before seeking care, call your healthcare provider and tell them that you have, or are being evaluated for, COVID-19.   ; Wear a facemask when sick: Put on a facemask before you enter the password. 8. Enter your e-mail address. You will receive e-mail notification when new information is available in [address] 9. Click Sign Up. You can now view your medical record. Additional Information  If you have questions, please contact your physician practice where you receive care. Remember, MyChart is NOT to be used for urgent needs. For medical emergencies, dial 911.

## 2020-07-27 ENCOUNTER — HOSPITAL ENCOUNTER (OUTPATIENT)
Age: 80
Setting detail: OUTPATIENT SURGERY
Discharge: HOME | End: 2020-07-27
Attending: PHYSICAL MEDICINE & REHABILITATION | Admitting: PHYSICAL MEDICINE & REHABILITATION
Payer: MEDICARE

## 2020-07-27 ENCOUNTER — TELEPHONE (OUTPATIENT)
Dept: ORTHOPEDIC SURGERY | Age: 80
End: 2020-07-27

## 2020-07-27 ENCOUNTER — APPOINTMENT (OUTPATIENT)
Dept: GENERAL RADIOLOGY | Age: 80
End: 2020-07-27
Attending: PHYSICAL MEDICINE & REHABILITATION
Payer: MEDICARE

## 2020-07-27 VITALS
HEIGHT: 61 IN | BODY MASS INDEX: 22.84 KG/M2 | SYSTOLIC BLOOD PRESSURE: 140 MMHG | RESPIRATION RATE: 18 BRPM | WEIGHT: 121 LBS | DIASTOLIC BLOOD PRESSURE: 49 MMHG | HEART RATE: 52 BPM | OXYGEN SATURATION: 95 % | TEMPERATURE: 98.3 F

## 2020-07-27 LAB
SARS-COV-2: NOT DETECTED
SOURCE: NORMAL

## 2020-07-27 PROCEDURE — 3610000058 HC PAIN LEVEL 5 BASE (NON-OR): Performed by: PHYSICAL MEDICINE & REHABILITATION

## 2020-07-27 PROCEDURE — 2709999900 HC NON-CHARGEABLE SUPPLY: Performed by: PHYSICAL MEDICINE & REHABILITATION

## 2020-07-27 PROCEDURE — 2500000003 HC RX 250 WO HCPCS: Performed by: PHYSICAL MEDICINE & REHABILITATION

## 2020-07-27 PROCEDURE — 3209999900 FLUORO FOR SURGICAL PROCEDURES

## 2020-07-27 RX ORDER — LIDOCAINE HYDROCHLORIDE 20 MG/ML
INJECTION, SOLUTION EPIDURAL; INFILTRATION; INTRACAUDAL; PERINEURAL
Status: COMPLETED | OUTPATIENT
Start: 2020-07-27 | End: 2020-07-27

## 2020-07-27 RX ORDER — LIDOCAINE HYDROCHLORIDE 10 MG/ML
INJECTION, SOLUTION EPIDURAL; INFILTRATION; INTRACAUDAL; PERINEURAL
Status: COMPLETED | OUTPATIENT
Start: 2020-07-27 | End: 2020-07-27

## 2020-07-27 ASSESSMENT — PAIN SCALES - GENERAL: PAINLEVEL_OUTOF10: 0

## 2020-07-27 ASSESSMENT — PAIN - FUNCTIONAL ASSESSMENT: PAIN_FUNCTIONAL_ASSESSMENT: 0-10

## 2020-07-27 NOTE — H&P
HISTORY AND PHYSICAL/PRE-SEDATION ASSESSMENT    Patient:  Claribel Molina   :  1940  Medical Record No.:  [id number]   Date:  2020  Physician:  Pierce Sanchez M.D. Facility: [address] [place] [address]    HISTORY OF PRESENT ILLNESS:                 The patient is a [de-identified] y.o. female whom presents with low back pain. Review of the imaging and physical exam of the patient confirmed the pre-procedure diagnosis. After a thorough discussion of risks, benefits and alternatives informed consent was obtained. Past Medical History:   Past Medical History:   Diagnosis Date    Asthma     Breast cyst     Essential tremor     HTN (hypertension)     Hyperlipidemia     Medical history reviewed with no changes     Ovarian cyst     Pneumonia     Stroke [facility])     mini      Past Surgical History:     Past Surgical History:   Procedure Laterality Date    CATARACT REMOVAL       SECTION      HYSTERECTOMY  1981    INNER EAR SURGERY  2007    PAIN MANAGEMENT PROCEDURE Right 2020    RIGHT L1 AND L2 TRANSFORAMINAL EPIDURAL STEROID INJECTION WITH FLUOROSCOPY (70219, 37056) performed by Pierce Sanchez MD at [address] Right 2020    RIGHT L1 AND L2 TRANSFORAMINAL EPIDURAL STEROID INJECTION WITH FLUOROSCOPY (84658, 78240) performed by Pierce Sanchez MD at [address] Bilateral 2020    BILATERAL L1, L2, L3 MEDIAL BRANCH BLOCK WITH FLUOROSCOPY (41929, 74118)  #1 performed by Pierce Sanchez MD at [facility]     Current Medications:   Prior to Admission medications    Medication Sig Start Date End Date Taking?  Authorizing Provider   lisinopril (PRINIVIL;ZESTRIL) 40 MG tablet TAKE 1 TABLET BY MOUTH EVERY DAY 20  Yes Megan Arnett DO   hydrochlorothiazide (HYDRODIURIL) 12.5 MG tablet TAKE 1 TABLET BY MOUTH DAILY AS NEEDED FOR SWELLING 20  Yes Greg Gonzalez MD   amLODIPine (NORVASC) 5 MG tablet TAKE 1 TABLET EVERY DAY 6/12/20  Yes YOSEF Corado - CNP   sertraline (ZOLOFT) 100 MG tablet TAKE 1 TABLET BY MOUTH EVERY DAY 5/11/20  Yes Merissa Mercado MD   naproxen (NAPROSYN) 500 MG tablet Take 1 tablet by mouth 2 times daily as needed for Pain 2/24/20  Yes Merissa Mercado MD   propranolol (INDERAL) 40 MG tablet TAKE 1 TABLET TWICE A DAY 1/17/20  Yes YOSEF Taylor - CNP   albuterol sulfate HFA (PROVENTIL HFA) 108 (90 Base) MCG/ACT inhaler Inhale 2 puffs into the lungs every 6 hours as needed for Wheezing Any inhaler insurance will cover 11/12/19  Yes Merissa Mercado MD   alendronate (FOSAMAX) 70 MG tablet TAKE 1 TABLET BY MOUTH ONE TIME PER WEEK 10/31/19  Yes YOSEF Corado - CNP   simvastatin (ZOCOR) 20 MG tablet TAKE 1 TABLET NIGHTLY 10/7/19  Yes Merissa Mercado MD   vitamin B-12 (CYANOCOBALAMIN) 1000 MCG tablet Take 1,000 mcg by mouth daily   Yes Historical Provider, MD   aspirin 81 MG EC tablet Take 81 mg by mouth daily    Historical Provider, MD   tiZANidine (ZANAFLEX) 2 MG tablet Take 1 tablet by mouth 3 times daily as needed (pain) 5/21/19   Merissa Mercado MD   docusate sodium (COLACE) 100 MG capsule Take 1 capsule by mouth 2 times daily as needed for Constipation 4/22/19   Cassie Bustos PA-C   cetirizine (ZYRTEC) 10 MG tablet Take 10 mg by mouth daily    Historical Provider, MD   Multiple Vitamins-Minerals (OCUVITE ADULT 50+) CAPS Take 1 capsule by mouth daily    Historical Provider, MD   Flaxseed, Linseed, (FLAXSEED OIL) 1000 MG CAPS Take 1 tablet by mouth daily    Historical Provider, MD   Glucosamine-Chondroit-Vit C-Mn (GLUCOSAMINE-CHONDROITIN) TABS Take 1 tablet by mouth 2 times daily. Historical Provider, MD     Allergies:  Levofloxacin and Penicillins  Social History:    reports that she has been smoking cigarettes. She has a 50.00 pack-year smoking history. She has never used smokeless tobacco. She reports current alcohol use. She reports that she does not use drugs. Family History:   Family History   Problem Relation Age of Onset    Heart Disease Mother     Stroke Mother     Heart Disease Father     High Blood Pressure Father     Mental Illness Father        Vitals: Blood pressure (!) 142/49, pulse 51, temperature 98.3 °F (36.8 °C), resp. rate 16, height 5' 1\" (1.549 m), weight 121 lb (54.9 kg), SpO2 98 %. PHYSICAL EXAM:including affected areas  HENT: Airway patent and reviewed  Cardiovascular: Normal rate, regular rhythm, normal heart sounds. Pulmonary/Chest: No wheezes. No rhonchi. No rales. Abdominal: Soft. Bowel sounds are normal. No distension. Extremities: Moves all extremities equally  Cervical and Lumbar Spine: Painful range of motion, no midline tenderness       Diagnosis:Lumbar spondylosis without radiculopathy  M47.816   M48.061   M48.062    Plan: Proceed with planned procedure      ASA CLASS:         []   I. Normal, healthy adult           [x]   II.  Mild systemic disease            []   III. Severe systemic disease      Mallampati: Mallampati Class II - (soft palate, fauces & uvula are visible)      Sedation plan:   [x]  Local              []  Minimal                  []  General anesthesia    Patient's condition acceptable for planned procedure/sedation. Post Procedure Plan   Return to same level of care   ______________________     The patient was counseled at length about the risks of nia Covid-19 in the lexie-operative and post-operative states including the recovery window of their procedure. The patient was made aware that nia Covid-19 after a surgical procedure may worsen their prognosis for recovering from the virus and lend to a higher morbidity and or mortality risk. The patient was given the options of postponing their procedure. All of the risks, benefits, and alternatives were discussed. The patient does wish to proceed with the procedure.      The risks and benefits as well as alternatives to the procedure have been discussed with the patient and or family. The patient and or next of kin understands and agrees to proceed.     Pierce Sanchez M.D.

## 2020-07-27 NOTE — PROGRESS NOTES
Procedural dressing dry and intact. Bilateral lower extremities equal in strength. Discharge instructions reviewed with patient or responsible adult, signed and copy given. All home medications have been reviewed. All questions answered and patient or responsible adult verbalized understanding.   PAIN LEVEL AT DISCHARGE _0____

## 2020-07-28 ENCOUNTER — TELEPHONE (OUTPATIENT)
Dept: ORTHOPEDIC SURGERY | Age: 80
End: 2020-07-28

## 2020-07-28 NOTE — TELEPHONE ENCOUNTER
The patient underwent a Bilateral L1, L2, L3 MBB #2 on 7/27/20. The patient describes that she received 80% relief for about 6-8 hours following the procedure. The patient received the same amount of relief from the first block. We will proceed to the RFA at this time. The injection letter will be placed and routed.

## 2020-07-28 NOTE — LETTER
Please schedule the following with:     Date:   20 @ 11:00    Account: [id number]  Patient: Michael Echeverria    : 1940  Address:  [address]    Phone (H):  [phone] (home)      ----------------------------------------------------------------------------------------------  Diagnosis:     ICD-10-CM    1. Spondylosis without myelopathy or radiculopathy, lumbar region  M47.816    2. Lumbar foraminal stenosis  M48.061    3. Lumbar stenosis with neurogenic claudication  M48.062      Procedure: Radiofrequency Ablation Lumbar     Levels: L1, L2, L3  Side: Bilateral   CPT Codes [id number], 67631    ----------------------------------------------------------------------------------------------  Injection # 1  MFASC    Attending Physician       Ignacia Aponte. Trina Joya MD.  ----------------------------------------------------------------------------------------------  Injection Scheduled For:    At:    1st Insurance [employer] MCR ADVANTAGE Pre-Cert#    2nd Insurance     Pre-Cert#    Comments: NO COVID REQUIRED PER [facility]. PATIENT HAS 3 NEGATIVE TEST RESULTS     · Infection control  ? Tested positive for MRSA in past 12 months:  no  ? Tested positive for MSSA \"staph infection\" in past 12 months: no  ? Tested positive for VRE (Vancomycin Resistant Enterococci) in past 12 months:   no  ? Currently on any antibiotics for an infection: no  · Anticoagulants:  ? On a blood thinner:  Yes, ASA 81 mg    ?  Any history of bleeding disorder: no   · Advanced Liver disease: no   · Advanced Renal disease: no   · Glaucoma: no   · Diabetes: no      Sedation:         Yes  -----------------------------------------------------------------------------------------------  Allergies   Allergen Reactions    Levofloxacin Anxiety    Penicillins Swelling

## 2020-08-05 NOTE — PROGRESS NOTES
PATIENT REACHED   YES_X___NO____    PREOP INSTUCTIONS   Patient instructed to get their COVID-19 test done as directed by their doctor (5-7 days prior to procedure)  or patient states will get on ___7/2&7/21_______. Patient was notified that they need to have an appointment,number to call provided. The day the COVID test is done is considered day one. Instructed to self quarantine after test until DOS. There is a one visitor policy at [facility] for the pre-op phase only for surgery and endoscopy cases. The visitor is expected to leave the facility after the patient is taken back for the procedure. Pain management is NO VISITOR policyThe patients ride is expected to remain in the car with a cell phone for communication. If the ride is leaving the hospital grounds please make sure they are back in time for pickup. Have the patient inform the staff on arrival what their rides plans are while the patient is in the facility. At the MAIN there is one visitor allowed. Please note that the visitor policy is subject to change. DATE__8/12/20_______ TIME__1100_______ARRIVAL__1000______PLACE__masc__________  NOTHING TO EAT OR DRINK  AFTER MIDNIGHT THE EVENING PRIOR OR AS INSTRUCTED BY YOUR DR.  Robbi Mueller NEED A RESPONSIBLE ADULT AGE 18 OR OLDER TO DRIVE YOU HOME  PLEASE BRING INSURANCE CARD. PICTURE ID AND COMPLETE LIST OF MEDS  WEAR LOOSE COMFORTABLE CLOTHING  FOLLOW ANY INSTRUCTIONS YOUR DRS OFFICE HAS GIVEN YOU,INCLUDING WHAT MEDICATIONS TO TAKE THE AM OF PROCEDURE AND WHEN AND IF YOU NEED TO STOP ANY BLOOD THINNERS. IF YOU HAVE QUESTIONS REGARDING THIS CALL THE OFFICE  THE GOAL BLOOD SUGAR THE AM OF PROCEDURE  OR LESS ABOVE THAT THE PROCEDURE MAY BE CANCELLED  ANY QUESTIONS CALL YOUR DOCTOR. ALSO,PLEASE READ THE INSTRUCTION PACKET FROM YOUR DR IF YOU RECEIVED ONE.   SPINE INTERVENTION NUMBER [phone]

## 2020-08-10 ENCOUNTER — TELEPHONE (OUTPATIENT)
Dept: ORTHOPEDIC SURGERY | Age: 80
End: 2020-08-10

## 2020-08-10 NOTE — TELEPHONE ENCOUNTER
Auth: # [id number]    Date: 08/12/2020  Type of SX:  OP  Location: [facility]  CPT: 25917.40  44959.45   DX Code: V57.406   M48.061   M48.062  SX area: bilateral L1   L2   L3   Neurotomy   Insurance: [employer] Medicare  CPT  49912.13  32177  [facility]

## 2020-08-12 ENCOUNTER — APPOINTMENT (OUTPATIENT)
Dept: GENERAL RADIOLOGY | Age: 80
End: 2020-08-12
Attending: PHYSICAL MEDICINE & REHABILITATION
Payer: MEDICARE

## 2020-08-12 ENCOUNTER — HOSPITAL ENCOUNTER (OUTPATIENT)
Age: 80
Setting detail: OUTPATIENT SURGERY
Discharge: HOME | End: 2020-08-12
Attending: PHYSICAL MEDICINE & REHABILITATION | Admitting: PHYSICAL MEDICINE & REHABILITATION
Payer: MEDICARE

## 2020-08-12 VITALS
BODY MASS INDEX: 23.6 KG/M2 | HEART RATE: 48 BPM | TEMPERATURE: 97.7 F | RESPIRATION RATE: 20 BRPM | OXYGEN SATURATION: 100 % | WEIGHT: 125 LBS | SYSTOLIC BLOOD PRESSURE: 116 MMHG | DIASTOLIC BLOOD PRESSURE: 76 MMHG | HEIGHT: 61 IN

## 2020-08-12 PROCEDURE — 6360000002 HC RX W HCPCS: Performed by: PHYSICAL MEDICINE & REHABILITATION

## 2020-08-12 PROCEDURE — 2500000003 HC RX 250 WO HCPCS: Performed by: PHYSICAL MEDICINE & REHABILITATION

## 2020-08-12 PROCEDURE — 99152 MOD SED SAME PHYS/QHP 5/>YRS: CPT | Performed by: PHYSICAL MEDICINE & REHABILITATION

## 2020-08-12 PROCEDURE — 3209999900 FLUORO FOR SURGICAL PROCEDURES

## 2020-08-12 PROCEDURE — 2709999900 HC NON-CHARGEABLE SUPPLY: Performed by: PHYSICAL MEDICINE & REHABILITATION

## 2020-08-12 PROCEDURE — 3610000059 HC PAIN LEVEL 5 ADDL 15 MIN (NON-OR): Performed by: PHYSICAL MEDICINE & REHABILITATION

## 2020-08-12 PROCEDURE — 3610000058 HC PAIN LEVEL 5 BASE (NON-OR): Performed by: PHYSICAL MEDICINE & REHABILITATION

## 2020-08-12 RX ORDER — BUPIVACAINE HYDROCHLORIDE 5 MG/ML
INJECTION, SOLUTION EPIDURAL; INTRACAUDAL
Status: COMPLETED | OUTPATIENT
Start: 2020-08-12 | End: 2020-08-12

## 2020-08-12 RX ORDER — LIDOCAINE HYDROCHLORIDE 20 MG/ML
INJECTION, SOLUTION EPIDURAL; INFILTRATION; INTRACAUDAL; PERINEURAL
Status: COMPLETED | OUTPATIENT
Start: 2020-08-12 | End: 2020-08-12

## 2020-08-12 RX ORDER — MIDAZOLAM HYDROCHLORIDE 1 MG/ML
INJECTION INTRAMUSCULAR; INTRAVENOUS
Status: COMPLETED | OUTPATIENT
Start: 2020-08-12 | End: 2020-08-12

## 2020-08-12 RX ORDER — LIDOCAINE HYDROCHLORIDE 10 MG/ML
INJECTION, SOLUTION EPIDURAL; INFILTRATION; INTRACAUDAL; PERINEURAL
Status: COMPLETED | OUTPATIENT
Start: 2020-08-12 | End: 2020-08-12

## 2020-08-12 RX ORDER — FENTANYL CITRATE 50 UG/ML
INJECTION, SOLUTION INTRAMUSCULAR; INTRAVENOUS
Status: COMPLETED | OUTPATIENT
Start: 2020-08-12 | End: 2020-08-12

## 2020-08-12 RX ORDER — DEXAMETHASONE SODIUM PHOSPHATE 10 MG/ML
INJECTION, SOLUTION INTRAMUSCULAR; INTRAVENOUS
Status: COMPLETED | OUTPATIENT
Start: 2020-08-12 | End: 2020-08-12

## 2020-08-12 ASSESSMENT — PAIN - FUNCTIONAL ASSESSMENT: PAIN_FUNCTIONAL_ASSESSMENT: 0-10

## 2020-08-12 ASSESSMENT — PAIN DESCRIPTION - DESCRIPTORS: DESCRIPTORS: PATIENT UNABLE TO DESCRIBE

## 2020-08-12 ASSESSMENT — PAIN SCALES - GENERAL: PAINLEVEL_OUTOF10: 0

## 2020-08-12 NOTE — H&P
HISTORY AND PHYSICAL/PRE-SEDATION ASSESSMENT    Patient:  Myesha Ugalde   :  1940  Medical Record No.:  [id number]   Date:  2020  Physician:  Laura Ho M.D. Facility: [address] [place] [address]    HISTORY OF PRESENT ILLNESS:                 The patient is a [de-identified] y.o. female whom presents with low back pain. Review of the imaging and physical exam of the patient confirmed the pre-procedure diagnosis. After a thorough discussion of risks, benefits and alternatives informed consent was obtained. Past Medical History:   Past Medical History:   Diagnosis Date    Asthma     Breast cyst     Essential tremor     HTN (hypertension)     Hyperlipidemia     Medical history reviewed with no changes     Ovarian cyst     Pneumonia     Stroke [facility])     mini      Past Surgical History:     Past Surgical History:   Procedure Laterality Date    CATARACT REMOVAL       SECTION      HYSTERECTOMY  1981    INNER EAR SURGERY  2007    PAIN MANAGEMENT PROCEDURE Right 2020    RIGHT L1 AND L2 TRANSFORAMINAL EPIDURAL STEROID INJECTION WITH FLUOROSCOPY (58282, 70783) performed by Laura Ho MD at [address] Right 2020    RIGHT L1 AND L2 TRANSFORAMINAL EPIDURAL STEROID INJECTION WITH FLUOROSCOPY (90673, 45299) performed by Laura Ho MD at [address] Bilateral 2020    BILATERAL L1, L2, L3 MEDIAL BRANCH BLOCK WITH FLUOROSCOPY (10158, 23155)  #1 performed by Laura Ho MD at [address] Bilateral 2020    BILATERAL L1 ,L2,L3 MEDIAL BRANCH BLOCKS WITH FLUOROSCOPY performed by Laura Ho MD at [facility]     Current Medications:   Prior to Admission medications    Medication Sig Start Date End Date Taking?  Authorizing Provider   lisinopril (PRINIVIL;ZESTRIL) 40 MG tablet TAKE 1 TABLET BY MOUTH EVERY DAY 20  Yes Leela Guillen DO   hydrochlorothiazide (HYDRODIURIL) 12.5 MG tablet TAKE 1 TABLET BY MOUTH DAILY AS NEEDED FOR SWELLING 7/21/20  Yes Zhang Padilla MD   aspirin 81 MG EC tablet Take 81 mg by mouth daily   Yes Historical Provider, MD   amLODIPine (NORVASC) 5 MG tablet TAKE 1 TABLET EVERY DAY 6/12/20  Yes YOSEF Suárez CNP   sertraline (ZOLOFT) 100 MG tablet TAKE 1 TABLET BY MOUTH EVERY DAY 5/11/20  Yes Zhang Padilla MD   naproxen (NAPROSYN) 500 MG tablet Take 1 tablet by mouth 2 times daily as needed for Pain 2/24/20  Yes Zhang Padilla MD   propranolol (INDERAL) 40 MG tablet TAKE 1 TABLET TWICE A DAY 1/17/20  Yes YOSEF Taylor CNP   albuterol sulfate HFA (PROVENTIL HFA) 108 (90 Base) MCG/ACT inhaler Inhale 2 puffs into the lungs every 6 hours as needed for Wheezing Any inhaler insurance will cover 11/12/19  Yes Zhang Padilla MD   alendronate (FOSAMAX) 70 MG tablet TAKE 1 TABLET BY MOUTH ONE TIME PER WEEK 10/31/19  Yes YOSEF Suárez CNP   simvastatin (ZOCOR) 20 MG tablet TAKE 1 TABLET NIGHTLY 10/7/19  Yes Zhang Padilla MD   docusate sodium (COLACE) 100 MG capsule Take 1 capsule by mouth 2 times daily as needed for Constipation 4/22/19  Yes Shanda Bustos PA-C   vitamin B-12 (CYANOCOBALAMIN) 1000 MCG tablet Take 1,000 mcg by mouth daily   Yes Historical Provider, MD   cetirizine (ZYRTEC) 10 MG tablet Take 10 mg by mouth daily   Yes Historical Provider, MD   tiZANidine (ZANAFLEX) 2 MG tablet Take 1 tablet by mouth 3 times daily as needed (pain) 5/21/19   Zhang Padilla MD     Allergies:  Levofloxacin and Penicillins  Social History:    reports that she has been smoking cigarettes. She has a 50.00 pack-year smoking history. She has never used smokeless tobacco. She reports current alcohol use. She reports that she does not use drugs.   Family History:   Family History   Problem Relation Age of Onset    Heart Disease Mother     Stroke Mother     Heart Disease Father    Elliot Briones High Blood Pressure Father     Mental Illness Father        Vitals: Blood pressure (!) 148/65, pulse 51, temperature 97.7 °F (36.5 °C), temperature source Temporal, resp. rate 16, height 5' 1\" (1.549 m), weight 125 lb (56.7 kg), SpO2 100 %. PHYSICAL EXAM:including affected areas  HENT: Airway patent and reviewed  Cardiovascular: Normal rate, regular rhythm, normal heart sounds. Pulmonary/Chest: No wheezes. No rhonchi. No rales. Abdominal: Soft. Bowel sounds are normal. No distension. Extremities: Moves all extremities equally  Cervical and Lumbar Spine: Painful range of motion, no midline tenderness       Diagnosis:Lumbar spondylosis without radiculopathy  M47.816 - M48.061 - M48.062    Plan: Proceed with planned procedure      ASA CLASS:         []   I. Normal, healthy adult           [x]   II.  Mild systemic disease            []   III. Severe systemic disease      Mallampati: Mallampati Class II - (soft palate, fauces & uvula are visible)      Sedation plan:   [x]  Local              []  Minimal                  []  General anesthesia    Patient's condition acceptable for planned procedure/sedation. Post Procedure Plan   Return to same level of care   ______________________     The patient was counseled at length about the risks of nia Covid-19 in the lexie-operative and post-operative states including the recovery window of their procedure. The patient was made aware that nia Covid-19 after a surgical procedure may worsen their prognosis for recovering from the virus and lend to a higher morbidity and or mortality risk. The patient was given the options of postponing their procedure. All of the risks, benefits, and alternatives were discussed. The patient does wish to proceed with the procedure. The risks and benefits as well as alternatives to the procedure have been discussed with the patient and or family.   The patient and or next of kin understands and agrees to shelly Colin M.D.

## 2020-08-12 NOTE — PROGRESS NOTES
IV discontinued, catheter intact, and dressing applied. Procedural dressing dry and intact. Bilateral upper extremities equal in strength. Discharge instructions reviewed with patient or responsible adult, signed and copy given. All home medications have been reviewed. All questions answered and patient or responsible adult verbalized understanding. Patient denies pain at this time.

## 2020-08-18 ENCOUNTER — OFFICE VISIT (OUTPATIENT)
Dept: INTERNAL MEDICINE CLINIC | Age: 80
End: 2020-08-18
Payer: MEDICARE

## 2020-08-18 VITALS
HEIGHT: 61 IN | DIASTOLIC BLOOD PRESSURE: 64 MMHG | TEMPERATURE: 96.7 F | SYSTOLIC BLOOD PRESSURE: 130 MMHG | WEIGHT: 121 LBS | HEART RATE: 52 BPM | BODY MASS INDEX: 22.84 KG/M2

## 2020-08-18 PROBLEM — F32.1 CURRENT MODERATE EPISODE OF MAJOR DEPRESSIVE DISORDER WITHOUT PRIOR EPISODE (HCC): Status: ACTIVE | Noted: 2020-08-18

## 2020-08-18 PROBLEM — G20.C PARKINSONIAN TREMOR: Status: RESOLVED | Noted: 2018-04-18 | Resolved: 2020-08-18

## 2020-08-18 PROBLEM — G20 PARKINSONIAN TREMOR (HCC): Status: RESOLVED | Noted: 2018-04-18 | Resolved: 2020-08-18

## 2020-08-18 PROCEDURE — 99214 OFFICE O/P EST MOD 30 MIN: CPT | Performed by: INTERNAL MEDICINE

## 2020-08-18 PROCEDURE — 96160 PT-FOCUSED HLTH RISK ASSMT: CPT | Performed by: INTERNAL MEDICINE

## 2020-08-18 RX ORDER — NICOTINE 21 MG/24HR
1 PATCH, TRANSDERMAL 24 HOURS TRANSDERMAL EVERY 24 HOURS
Qty: 90 PATCH | Refills: 3 | Status: SHIPPED | OUTPATIENT
Start: 2020-08-18 | End: 2020-12-21

## 2020-08-18 ASSESSMENT — PATIENT HEALTH QUESTIONNAIRE - PHQ9
SUM OF ALL RESPONSES TO PHQ QUESTIONS 1-9: 4
4. FEELING TIRED OR HAVING LITTLE ENERGY: 1
SUM OF ALL RESPONSES TO PHQ QUESTIONS 1-9: 4
3. TROUBLE FALLING OR STAYING ASLEEP: 0
8. MOVING OR SPEAKING SO SLOWLY THAT OTHER PEOPLE COULD HAVE NOTICED. OR THE OPPOSITE, BEING SO FIGETY OR RESTLESS THAT YOU HAVE BEEN MOVING AROUND A LOT MORE THAN USUAL: 0
2. FEELING DOWN, DEPRESSED OR HOPELESS: 1
6. FEELING BAD ABOUT YOURSELF - OR THAT YOU ARE A FAILURE OR HAVE LET YOURSELF OR YOUR FAMILY DOWN: 0
SUM OF ALL RESPONSES TO PHQ9 QUESTIONS 1 & 2: 1
5. POOR APPETITE OR OVEREATING: 2
1. LITTLE INTEREST OR PLEASURE IN DOING THINGS: 0
9. THOUGHTS THAT YOU WOULD BE BETTER OFF DEAD, OR OF HURTING YOURSELF: 0
7. TROUBLE CONCENTRATING ON THINGS, SUCH AS READING THE NEWSPAPER OR WATCHING TELEVISION: 0

## 2020-08-18 NOTE — PROGRESS NOTES
Chief Complaint   Patient presents with    Hypertension    Hyperlipidemia    Depression       HPI:    HTN: The patient is tolerating blood pressure medication well and taking them as directed. BP control outside of the office is reported as generally well controlled. No symptoms concerning for end organ damage are present. HLD: taking simvastatin daily. Denies side effects. Depression: she reports her mood is improving. She continues to take sertraline 100mg daily. PHQ-9 Completed. Total score is 4. Social History     Tobacco Use    Smoking status: Current Every Day Smoker     Packs/day: 1.00     Years: 50.00     Pack years: 50.00     Types: Cigarettes    Smokeless tobacco: Never Used   Substance Use Topics    Alcohol use: Yes     Comment: very rare sometimes a glass of wine    Drug use: No   Son-in-law was diagnosed with lung CA. She would like to quit smoking.     ROS:  CV: Neg for chest pain  RESP: neg for dyspnea  NEURO: Nerve ablation performed last week for back pain is helping     EXAM:  /64   Pulse 52   Temp 96.7 °F (35.9 °C) (Temporal)   Ht 5' 1\" (1.549 m)   Wt 121 lb (54.9 kg)   BMI 22.86 kg/m²    GEN: WN/WD, NAD  CV: regular rate and rhythm, no murmurs rubs or gallops  Resp: normal effort, clear auscultation bilaterally  No peripheral edema     Lab Results   Component Value Date    CREATININE 1.2 02/19/2020    BUN 37 (H) 02/19/2020     02/19/2020    K 3.9 02/19/2020     02/19/2020    CO2 24 02/19/2020     Lab Results   Component Value Date    CHOL 184 02/19/2020    CHOL 170 07/19/2018    CHOL 168 07/11/2017     Lab Results   Component Value Date    TRIG 189 (H) 02/19/2020    TRIG 170 (H) 07/19/2018    TRIG 272 (H) 07/11/2017     Lab Results   Component Value Date    HDL 37 (L) 02/19/2020    HDL 37 (L) 07/19/2018    HDL 31 (L) 07/11/2017     Lab Results   Component Value Date    LDLCALC 109 (H) 02/19/2020    [address] 07/19/2018    LDLCALC 83 07/11/2017 Lab Results   Component Value Date    LABVLDL 38 02/19/2020    LABVLDL 34 07/19/2018    LABVLDL 54 07/11/2017     No results found for: CHOLHDLRATIO     A/P  1. Essential hypertension  Chronic, controlled  BW reviewed and UTD  The current medical regimen is effective;  continue present plan and medications. 2. Mixed hyperlipidemia  Chronic and stable  The current medical regimen is effective;  continue present plan and medications. 3. Current moderate episode of major depressive disorder without prior episode ([facility] [address]  Doing well at this time.   Continue sertraline    RTO 4 months

## 2020-09-10 ENCOUNTER — TELEPHONE (OUTPATIENT)
Dept: ORTHOPEDIC SURGERY | Age: 80
End: 2020-09-10

## 2020-09-10 ENCOUNTER — VIRTUAL VISIT (OUTPATIENT)
Dept: ORTHOPEDIC SURGERY | Age: 80
End: 2020-09-10
Payer: MEDICARE

## 2020-09-10 PROCEDURE — 99213 OFFICE O/P EST LOW 20 MIN: CPT | Performed by: STUDENT IN AN ORGANIZED HEALTH CARE EDUCATION/TRAINING PROGRAM

## 2020-09-10 NOTE — PROGRESS NOTES
walk for longer than 10 to 15 minutes without a considerable amount of pain. Associated signs and symptoms:   Neurogenic bowel or bladder symptoms:  no   Perceived weakness:  no   Difficulty walking:  no            Past medical, surgical, social and family history reviewed with the patient.  No pertinent relevant history  Past Medical History:   Past Medical History:   Diagnosis Date    Asthma     Breast cyst     Essential tremor     HTN (hypertension)     Hyperlipidemia     Medical history reviewed with no changes     Ovarian cyst     Pneumonia 2000    Stroke [facility])     mini      Past Surgical History:     Past Surgical History:   Procedure Laterality Date    CATARACT REMOVAL       SECTION      HYSTERECTOMY  1981    INNER EAR SURGERY  2007    PAIN MANAGEMENT PROCEDURE Right 2020    RIGHT L1 AND L2 TRANSFORAMINAL EPIDURAL STEROID INJECTION WITH FLUOROSCOPY (09224, 02552) performed by Rylee Pierre MD at [address] Right 2020    RIGHT L1 AND L2 TRANSFORAMINAL EPIDURAL STEROID INJECTION WITH FLUOROSCOPY (59742, 96997) performed by Rylee Pierre MD at [address] Bilateral 2020    BILATERAL L1, L2, L3 MEDIAL BRANCH BLOCK WITH FLUOROSCOPY (13067, 86428)  #1 performed by Rylee Pierre MD at [address] Bilateral 2020    BILATERAL L1 ,L2,L3 MEDIAL BRANCH BLOCKS WITH FLUOROSCOPY performed by Rylee Pierre MD at [address] Bilateral 2020    BILATERAL L1, L2, L3 RADIOFREQUENCY ABLATION WITH FLUOROSCOPY performed by Rylee Pierre MD at [facility]     Current Medications:     Current Outpatient Medications:     nicotine (NICODERM CQ) 21 MG/24HR, Place 1 patch onto the skin every 24 hours, Disp: 90 patch, Rfl: 3    lisinopril (PRINIVIL;ZESTRIL) 40 MG tablet, TAKE 1 TABLET BY MOUTH EVERY DAY, Disp: 90 tablet, Rfl: 0    hydrochlorothiazide (HYDRODIURIL) 12.5 MG tablet, TAKE 1 TABLET BY MOUTH DAILY AS NEEDED FOR SWELLING, Disp: 90 tablet, Rfl: 1    aspirin 81 MG EC tablet, Take 81 mg by mouth daily, Disp: , Rfl:     amLODIPine (NORVASC) 5 MG tablet, TAKE 1 TABLET EVERY DAY, Disp: 90 tablet, Rfl: 1    sertraline (ZOLOFT) 100 MG tablet, TAKE 1 TABLET BY MOUTH EVERY DAY, Disp: 90 tablet, Rfl: 1    naproxen (NAPROSYN) 500 MG tablet, Take 1 tablet by mouth 2 times daily as needed for Pain, Disp: 60 tablet, Rfl: 0    propranolol (INDERAL) 40 MG tablet, TAKE 1 TABLET TWICE A DAY, Disp: 180 tablet, Rfl: 4    albuterol sulfate HFA (PROVENTIL HFA) 108 (90 Base) MCG/ACT inhaler, Inhale 2 puffs into the lungs every 6 hours as needed for Wheezing Any inhaler insurance will cover, Disp: 1 Inhaler, Rfl: 1    alendronate (FOSAMAX) 70 MG tablet, TAKE 1 TABLET BY MOUTH ONE TIME PER WEEK, Disp: 12 tablet, Rfl: 3    simvastatin (ZOCOR) 20 MG tablet, TAKE 1 TABLET NIGHTLY, Disp: 90 tablet, Rfl: 3    tiZANidine (ZANAFLEX) 2 MG tablet, Take 1 tablet by mouth 3 times daily as needed (pain), Disp: 30 tablet, Rfl: 0    docusate sodium (COLACE) 100 MG capsule, Take 1 capsule by mouth 2 times daily as needed for Constipation, Disp: 30 capsule, Rfl: 0    vitamin B-12 (CYANOCOBALAMIN) 1000 MCG tablet, Take 1,000 mcg by mouth daily, Disp: , Rfl:     cetirizine (ZYRTEC) 10 MG tablet, Take 10 mg by mouth daily, Disp: , Rfl:   Allergies:  Levofloxacin and Penicillins  Social History:    reports that she has been smoking cigarettes. She has a 50.00 pack-year smoking history. She has never used smokeless tobacco. She reports current alcohol use. She reports that she does not use drugs.   Family History:   Family History   Problem Relation Age of Onset    Heart Disease Mother     Stroke Mother     Heart Disease Father     High Blood Pressure Father     Mental Illness Father        REVIEW OF SYSTEMS:   CONSTITUTIONAL: Denies unexplained weight loss, fevers, chills or mild noncompressive disc bulge.      Moderate right and mild left L5 foraminal stenosis.         LUMBOSACRAL JUNCTION: Unremarkable    SACRUM AND SI JOINTS:  Unremarkable    OTHER:  None              IMPRESSION:              Mild acute appearing superior endplate compression fracture at L1 without significant central    canal compromise        Results for orders placed or performed in visit on 20   COVID-19 Ambulatory    Specimen: Nasopharyngeal Swab   Result Value Ref Range    SARS-CoV-2 Not Detected Not Detected    Source NP swab      Impression:       1. Spondylosis without myelopathy or radiculopathy, lumbar region    2. Lumbar foraminal stenosis    3. Lumbar stenosis with neurogenic claudication        Plan:  Clinical Course: shows no change    I discussed the diagnosis and the treatment options with Yaneth Zambrano today. In Summary:  The various treatment options were outlined and discussed with Yaneth Zambrano including:  Conservative care options: physical therapy, ice, medications, bracing, and activity modification. The indications for therapeutic injections. The indications for additional imaging/laboratory studies. The indications for (possible future) interventions. After considering the various options discussed, Yaneth Zambrano elected to pursue a course of treatment that includes the followin. Medications:  No further recommendations for new medications. 2. PT:  Encouraged to continue with Home exercise program.    3. Further studies: No further studies. 4. Interventional:  30% relief with lumbar RFA. Discussed spinal cord stimulation with the patient and provided a packet. We will see her in the office to discuss this further as a potential interventional option. 5. Follow up:  2-3 weeks      Yaneth Zambrano was instructed to call the office if her symptoms worsen or if new symptoms appear prior to the next scheduled visit.  She is specifically instructed to contact the office between now & her scheduled appointment if she has concerns related to her condition or if she needs assistance in scheduling the above tests. She is welcome to call for an appointment sooner if she has any additional concerns or questions. Vignesh Conroy PA-C  Board Certified by the [employer] on Certification of Physician Assistants  Jerrod Solorzano 57  Partner [facility]          This dictation was performed with a verbal recognition program [facility]) and it was checked for errors. It is possible that there are still dictated errors within this office note. If so, please bring any errors to my attention for an addendum. All efforts were made to ensure that this office note is accurate. Patient has verbally accepted the following: We confirm that, for purposes of billing, this is a virtual visit with the provider for which we will submit a claim for reimbursement with the insurance company. The patient accepts responsibility for any co-pays, coinsurance amounts or other amounts not covered by the insurance company. Patient location: [facility]  Physician location: home  Time spent: not billed by time  Present on the call: myself and patient    Pursuant to the emergency declaration under the [address] [facility], [address] waiver authority and the [employer] Act, this Virtual  Visit was conducted, with patient's consent, to reduce the patient's risk of exposure to COVID-19 and provide continuity of care for an established patient. Services were provided through a video synchronous discussion virtually to substitute for in-person clinic visit. We have confirmed that, for purposes of billing, this is a virtual visit with for which we will submit a claim for reimbursement with your insurance company.  The patient has accepted responsibility for any copays, coinsurance amounts or other amounts not covered by their insurance company. This visit was completed virtually using [email or web address] me.

## 2020-09-18 RX ORDER — ALENDRONATE SODIUM 70 MG/1
TABLET ORAL
Qty: 12 TABLET | Refills: 3 | Status: SHIPPED | OUTPATIENT
Start: 2020-09-18 | End: 2021-08-20

## 2020-09-26 ENCOUNTER — APPOINTMENT (OUTPATIENT)
Dept: CT IMAGING | Age: 80
End: 2020-09-26
Payer: MEDICARE

## 2020-09-26 ENCOUNTER — APPOINTMENT (OUTPATIENT)
Dept: GENERAL RADIOLOGY | Age: 80
End: 2020-09-26
Payer: MEDICARE

## 2020-09-26 ENCOUNTER — HOSPITAL ENCOUNTER (EMERGENCY)
Age: 80
Discharge: HOME | End: 2020-09-26
Attending: EMERGENCY MEDICINE
Payer: MEDICARE

## 2020-09-26 VITALS
WEIGHT: 120 LBS | OXYGEN SATURATION: 94 % | SYSTOLIC BLOOD PRESSURE: 170 MMHG | HEART RATE: 58 BPM | DIASTOLIC BLOOD PRESSURE: 63 MMHG | RESPIRATION RATE: 18 BRPM | BODY MASS INDEX: 22.66 KG/M2 | HEIGHT: 61 IN | TEMPERATURE: 98.1 F

## 2020-09-26 LAB
ANION GAP SERPL CALCULATED.3IONS-SCNC: 12 MMOL/L (ref 3–16)
BASOPHILS ABSOLUTE: 0.1 K/UL (ref 0–0.2)
BASOPHILS RELATIVE PERCENT: 0.6 %
BUN BLDV-MCNC: 25 MG/DL (ref 7–20)
CALCIUM SERPL-MCNC: 9.6 MG/DL (ref 8.3–10.6)
CHLORIDE BLD-SCNC: 105 MMOL/L (ref 99–110)
CO2: 23 MMOL/L (ref 21–32)
CREAT SERPL-MCNC: 0.9 MG/DL (ref 0.6–1.2)
EOSINOPHILS ABSOLUTE: 0.4 K/UL (ref 0–0.6)
EOSINOPHILS RELATIVE PERCENT: 4.8 %
GFR AFRICAN AMERICAN: >60
GFR NON-AFRICAN AMERICAN: >60
GLUCOSE BLD-MCNC: 94 MG/DL (ref 70–99)
HCT VFR BLD CALC: 36.9 % (ref 36–48)
HEMOGLOBIN: 12.8 G/DL (ref 12–16)
LYMPHOCYTES ABSOLUTE: 1 K/UL (ref 1–5.1)
LYMPHOCYTES RELATIVE PERCENT: 10.7 %
MCH RBC QN AUTO: 34.2 PG (ref 26–34)
MCHC RBC AUTO-ENTMCNC: 34.6 G/DL (ref 31–36)
MCV RBC AUTO: 99 FL (ref 80–100)
MONOCYTES ABSOLUTE: 0.4 K/UL (ref 0–1.3)
MONOCYTES RELATIVE PERCENT: 4.9 %
NEUTROPHILS ABSOLUTE: 7.2 K/UL (ref 1.7–7.7)
NEUTROPHILS RELATIVE PERCENT: 79 %
PDW BLD-RTO: 14.7 % (ref 12.4–15.4)
PLATELET # BLD: 193 K/UL (ref 135–450)
PMV BLD AUTO: 9.1 FL (ref 5–10.5)
POTASSIUM SERPL-SCNC: 3.8 MMOL/L (ref 3.5–5.1)
RBC # BLD: 3.73 M/UL (ref 4–5.2)
SODIUM BLD-SCNC: 140 MMOL/L (ref 136–145)
WBC # BLD: 9.1 K/UL (ref 4–11)

## 2020-09-26 PROCEDURE — 85025 COMPLETE CBC W/AUTO DIFF WBC: CPT

## 2020-09-26 PROCEDURE — 70486 CT MAXILLOFACIAL W/O DYE: CPT

## 2020-09-26 PROCEDURE — 99285 EMERGENCY DEPT VISIT HI MDM: CPT

## 2020-09-26 PROCEDURE — 72125 CT NECK SPINE W/O DYE: CPT

## 2020-09-26 PROCEDURE — 80048 BASIC METABOLIC PNL TOTAL CA: CPT

## 2020-09-26 PROCEDURE — 36415 COLL VENOUS BLD VENIPUNCTURE: CPT

## 2020-09-26 PROCEDURE — 6370000000 HC RX 637 (ALT 250 FOR IP): Performed by: PHYSICIAN ASSISTANT

## 2020-09-26 PROCEDURE — 71260 CT THORAX DX C+: CPT

## 2020-09-26 PROCEDURE — 70450 CT HEAD/BRAIN W/O DYE: CPT

## 2020-09-26 PROCEDURE — 6360000004 HC RX CONTRAST MEDICATION: Performed by: PHYSICIAN ASSISTANT

## 2020-09-26 RX ORDER — HYDROCODONE BITARTRATE AND ACETAMINOPHEN 5; 325 MG/1; MG/1
1 TABLET ORAL ONCE
Status: COMPLETED | OUTPATIENT
Start: 2020-09-26 | End: 2020-09-26

## 2020-09-26 RX ORDER — HYDROCODONE BITARTRATE AND ACETAMINOPHEN 5; 325 MG/1; MG/1
1 TABLET ORAL EVERY 6 HOURS PRN
Qty: 10 TABLET | Refills: 0 | Status: SHIPPED | OUTPATIENT
Start: 2020-09-26 | End: 2020-09-29

## 2020-09-26 RX ADMIN — HYDROCODONE BITARTRATE AND ACETAMINOPHEN 1 TABLET: 5; 325 TABLET ORAL at 19:55

## 2020-09-26 RX ADMIN — IOPAMIDOL 75 ML: 755 INJECTION, SOLUTION INTRAVENOUS at 21:14

## 2020-09-26 ASSESSMENT — ENCOUNTER SYMPTOMS
STRIDOR: 0
ABDOMINAL PAIN: 0
EYE ITCHING: 0
SINUS PRESSURE: 0
PHOTOPHOBIA: 0
NAUSEA: 0
BACK PAIN: 0
RHINORRHEA: 0
VOICE CHANGE: 0
FACIAL SWELLING: 1
VOMITING: 0
EYE PAIN: 0
SORE THROAT: 0
CONSTIPATION: 0
EYE REDNESS: 0
EYE DISCHARGE: 0
COLOR CHANGE: 0
SINUS PAIN: 0
SHORTNESS OF BREATH: 0
ABDOMINAL DISTENTION: 0
DIARRHEA: 0
COUGH: 0
WHEEZING: 0
TROUBLE SWALLOWING: 0

## 2020-09-26 ASSESSMENT — VISUAL ACUITY: OU: 1

## 2020-09-26 ASSESSMENT — PAIN SCALES - GENERAL: PAINLEVEL_OUTOF10: 5

## 2020-09-26 NOTE — ED PROVIDER NOTES
[facility]        Pt Name: Brittnee Elena  MRN: [id number]  [place] 1940  Date of evaluation: 9/26/2020  Provider: Sharona Franco PA-C  PCP: Prashanth Macedo MD     I have seen and evaluated this patient with my supervising physician Dr Sofy Sheridan       Chief Complaint   Patient presents with    Fall     pt fell while chasing her dog today, pt noted with large hematoma to left eye browse, right wrist pain, left breast pain , and lip pain        HISTORY OF PRESENT ILLNESS   (Location, Timing/Onset, Context/Setting, Quality, Duration, Modifying Factors, Severity, Associated Signs and Symptoms)  Note limiting factors. Brittnee Elena is a 79 y.o. female who presents with multiple areas of injury status post mechanical fall which occurred around 4 PM today. The patient states that she was walking her daughter's dog and the dog Pulling away from her while she was holding the leash. This caused her to have momentum forward and fell forward hitting her face on the ground. She denies loss of consciousness. She takes a baby aspirin daily. The patient has bruising to her left upper eyelid and forehead. She sustained abrasions to the left forehead, nasal bridge and chin. She has some swelling to her left lower lip. Denies any acute dental injury, jaw malalignment or malocclusion. She denies any pain within the eye or visual disturbance. However, has difficulty opening the left eye secondary to the amount of swelling she is got to her left upper eyelid. She also has some bruising to the palms of both of her hands but is not reporting any significant pain here. Patient also reports pain to her left breast/chest wall since the fall. Nursing Notes were all reviewed and agreed with or any disagreements were addressed in the HPI.     REVIEW OF SYSTEMS    (2-9 systems for level 4, 10 or more for level 5)     Review of Systems   Constitutional: Negative for chills and fever. HENT: Positive for facial swelling. Negative for congestion, dental problem, drooling, ear discharge, ear pain, hearing loss, mouth sores, nosebleeds, postnasal drip, rhinorrhea, sinus pressure, sinus pain, sneezing, sore throat, tinnitus, trouble swallowing and voice change. Eyes: Negative for photophobia, pain, discharge, redness, itching and visual disturbance. Respiratory: Negative for cough, shortness of breath, wheezing and stridor. Cardiovascular: Positive for chest pain. Negative for palpitations and leg swelling. Gastrointestinal: Negative for abdominal distention, abdominal pain, constipation, diarrhea, nausea and vomiting. Endocrine: Negative. Genitourinary: Negative. Musculoskeletal: Positive for myalgias. Negative for back pain, neck pain and neck stiffness. Skin: Positive for wound. Negative for color change, pallor and rash. Neurological: Negative for dizziness, tremors, seizures, syncope, facial asymmetry, speech difficulty, weakness, light-headedness, numbness and headaches. Psychiatric/Behavioral: Negative for confusion. All other systems reviewed and are negative. Positives and Pertinent negatives as per HPI. Except as noted above in the ROS, all other systems were reviewed and negative.        PAST MEDICAL HISTORY     Past Medical History:   Diagnosis Date    Asthma     Breast cyst     Essential tremor     HTN (hypertension)     Hyperlipidemia     Medical history reviewed with no changes     Ovarian cyst     Pneumonia 2000    Stroke [facility])     mini         SURGICAL HISTORY     Past Surgical History:   Procedure Laterality Date    CATARACT REMOVAL  2012     SECTION      HYSTERECTOMY  1981    INNER EAR SURGERY  2007    PAIN MANAGEMENT PROCEDURE Right 2020    RIGHT L1 AND L2 TRANSFORAMINAL EPIDURAL STEROID INJECTION WITH FLUOROSCOPY (97457, 47370) performed by Melissa Colin MD at [address] [place] [address] Right 5/27/2020    RIGHT L1 AND L2 TRANSFORAMINAL EPIDURAL STEROID INJECTION WITH FLUOROSCOPY (51553, 00851) performed by Melissa Colin MD at [address] [place] [address] Bilateral 7/8/2020    BILATERAL L1, L2, L3 MEDIAL BRANCH BLOCK WITH FLUOROSCOPY (31706, 73561)  #1 performed by Melissa Colin MD at [address] [place] [address] Bilateral 7/27/2020    BILATERAL L1 ,L2,L3 MEDIAL BRANCH BLOCKS WITH FLUOROSCOPY performed by Melissa Colin MD at [address] [place] [address] Bilateral 8/12/2020    BILATERAL L1, L2, L3 RADIOFREQUENCY ABLATION WITH FLUOROSCOPY performed by Melissa Colin MD at [address] [facility]       Discharge Medication List as of 9/26/2020 11:37 PM      CONTINUE these medications which have NOT CHANGED    Details   alendronate (FOSAMAX) 70 MG tablet TAKE 1 TABLET BY MOUTH ONE TIME PER WEEK, Disp-12 tablet,R-3Normal      nicotine (NICODERM CQ) 21 MG/24HR Place 1 patch onto the skin every 24 hours, Disp-90 patch,R-3Normal      lisinopril (PRINIVIL;ZESTRIL) 40 MG tablet TAKE 1 TABLET BY MOUTH EVERY DAY, Disp-90 tablet,R-0Normal      hydrochlorothiazide (HYDRODIURIL) 12.5 MG tablet TAKE 1 TABLET BY MOUTH DAILY AS NEEDED FOR SWELLING, Disp-90 tablet,R-1Normal      aspirin 81 MG EC tablet Take 81 mg by mouth dailyHistorical Med      amLODIPine (NORVASC) 5 MG tablet TAKE 1 TABLET EVERY DAY, Disp-90 tablet, R-1Normal      sertraline (ZOLOFT) 100 MG tablet TAKE 1 TABLET BY MOUTH EVERY DAY, Disp-90 tablet, R-1Normal      naproxen (NAPROSYN) 500 MG tablet Take 1 tablet by mouth 2 times daily as needed for Pain, Disp-60 tablet, R-0Normal      propranolol (INDERAL) 40 MG tablet TAKE 1 TABLET TWICE A DAY, Disp-180 tablet, R-4Normal      albuterol sulfate HFA (PROVENTIL HFA) 108 (90 Base) MCG/ACT inhaler Inhale 2 puffs into the lungs every 6 hours as needed for Wheezing Any inhaler insurance will cover, Disp-1 Inhaler, R-1Normal      simvastatin (ZOCOR) 20 MG tablet TAKE 1 TABLET NIGHTLY, Disp-90 tablet, R-3Normal      tiZANidine (ZANAFLEX) 2 MG tablet Take 1 tablet by mouth 3 times daily as needed (pain), Disp-30 tablet, R-0Normal      docusate sodium (COLACE) 100 MG capsule Take 1 capsule by mouth 2 times daily as needed for Constipation, Disp-30 capsule, R-0Print      vitamin B-12 (CYANOCOBALAMIN) 1000 MCG tablet Take 1,000 mcg by mouth dailyHistorical Med      cetirizine (ZYRTEC) 10 MG tablet Take 10 mg by mouth dailyHistorical Med               ALLERGIES     Levofloxacin and Penicillins    FAMILYHISTORY       Family History   Problem Relation Age of Onset    Heart Disease Mother     Stroke Mother     Heart Disease Father     High Blood Pressure Father     Mental Illness Father           SOCIAL HISTORY       Social History     Tobacco Use    Smoking status: Current Every Day Smoker     Packs/day: 1.00     Years: 50.00     Pack years: 50.00     Types: Cigarettes    Smokeless tobacco: Never Used   Substance Use Topics    Alcohol use: Yes     Comment: very rare sometimes a glass of wine    Drug use: No       SCREENINGS             PHYSICAL EXAM    (up to 7 for level 4, 8 or more for level 5)     ED Triage Vitals [09/26/20 1740]   BP Temp Temp src Pulse Resp SpO2 Height Weight   (!) 170/63 98.1 °F (36.7 °C) -- 58 18 94 % 5' 1\" (1.549 m) 120 lb (54.4 kg)       Physical Exam  Vitals signs and nursing note reviewed. Constitutional:       Appearance: Normal appearance. She is well-developed. She is not toxic-appearing or diaphoretic. HENT:      Head: Normocephalic. Abrasion and contusion present. No laceration. Jaw: There is normal jaw occlusion. Right Ear: Tympanic membrane, ear canal and external ear normal.      Left Ear: Tympanic membrane, ear canal and external ear normal.      Nose: Nose normal.      Mouth/Throat:      Mouth: Mucous membranes are moist. Injury present. Dentition: No dental tenderness. Pharynx: Oropharynx is clear. Uvula midline. Eyes:      General: Vision grossly intact. Gaze aligned appropriately. No visual field deficit or scleral icterus. Right eye: No foreign body, discharge or hordeolum. Left eye: No foreign body, discharge or hordeolum. Extraocular Movements: Extraocular movements intact. Conjunctiva/sclera: Conjunctivae normal.      Right eye: No hemorrhage. Left eye: No hemorrhage. Pupils: Pupils are equal, round, and reactive to light. Funduscopic exam:     Right eye: No hemorrhage. Red reflex present. Left eye: No hemorrhage. Red reflex present. Slit lamp exam:     Right eye: Anterior chamber quiet. Left eye: Anterior chamber quiet. Visual Fields: Right eye visual fields normal and left eye visual fields normal.     Neck:      Musculoskeletal: Normal range of motion. Cardiovascular:      Rate and Rhythm: Normal rate. Pulmonary:      Effort: Pulmonary effort is normal.      Breath sounds: Normal breath sounds. Abdominal:      General: Bowel sounds are normal. There is no distension. Palpations: Abdomen is soft. Tenderness: There is no abdominal tenderness. Musculoskeletal: Normal range of motion. Right wrist: She exhibits normal range of motion and no tenderness. Left wrist: She exhibits normal range of motion and no tenderness. Right hip: Normal.      Left hip: Normal.      Cervical back: Normal.      Thoracic back: Normal.      Lumbar back: Normal.      Right hand: She exhibits normal range of motion and no tenderness. Normal sensation noted. Normal strength noted. Left hand: She exhibits normal range of motion and no tenderness. Normal sensation noted. Normal strength noted. Hands:    Skin:     General: Skin is warm and dry. Capillary Refill: Capillary refill takes less than 2 seconds. Coloration: Skin is not jaundiced or pale. Findings: No bruising, erythema, lesion or rash. Neurological:      General: No focal deficit present. Mental Status: She is alert and oriented to person, place, and time. Cranial Nerves: No cranial nerve deficit (II-XII intact). Psychiatric:         Mood and Affect: Mood normal.         Behavior: Behavior normal.         DIAGNOSTIC RESULTS   LABS:    Labs Reviewed   BASIC METABOLIC PANEL - Abnormal; Notable for the following components:       Result Value    BUN 25 (*)     All other components within normal limits    Narrative:     Performed at:  [facility] [address], [place], [facility] [place]   Phone [phone]   CBC WITH AUTO DIFFERENTIAL - Abnormal; Notable for the following components:    RBC 3.73 (*)     MCH 34.2 (*)     All other components within normal limits    Narrative:     Performed at:  [facility] [address] [facility] [place]   Phone [phone]       All other labs were within normal range or not returned as of this dictation. EKG: All EKG's are interpreted by the Emergency Department Physician in the absence of a cardiologist.  Please see their note for interpretation of EKG. RADIOLOGY:   Non-plain film images such as CT, Ultrasound and MRI are read by the radiologist. Plain radiographic images are visualized and preliminarily interpreted by the ED Provider with the below findings:        Interpretation per the Radiologist below, if available at the time of this note:    CT CHEST ABDOMEN PELVIS W CONTRAST   Preliminary Result   No acute traumatic injury of the chest, abdomen or pelvis. Sequela of remote injury of multiple right ribs as well as the right clavicle. Chronic burst compression fracture of L1 with 8 mm of retropulsion or   fracture fragments, exaggerated kyphosis at this level and severe spinal   canal stenosis.   No significant change since 07/02/2019 lumbar spine radiographs. No evidence of acute fracture. Mild centrilobular emphysema in the lungs. CT CERVICAL SPINE WO CONTRAST   Final Result   1. CT MAXILLOFACIAL BONES: LEFT orbital medial wall compression fracture   without extraocular muscle involvement   2. Prominent central and left periorbital and frontal scalp   contusion/hematoma. 3. CT HEAD: No acute intracranial abnormality. 4. Prominent central and left supraorbital and frontal scalp   contusion/hematoma. 5. Senescent changes. 6. CT CERVICAL SPINE: No cervical fracture or acute traumatic subluxation. 7. Degenerative changes involving the cervical spine described above. 8. C4-5 cervical spinal canal stenosis of mild severity resulting from   posterior disc bulge/nucleus pulposis. 9. Emphysema. Note that if pain persists or worsens, or if clinically there is concern for   CT occult acute cervical abnormality, flexion/extension C-spine series or MRI   cervical spine may be considered for additional evaluation. CT HEAD WO CONTRAST   Final Result   1. CT MAXILLOFACIAL BONES: LEFT orbital medial wall compression fracture   without extraocular muscle involvement   2. Prominent central and left periorbital and frontal scalp   contusion/hematoma. 3. CT HEAD: No acute intracranial abnormality. 4. Prominent central and left supraorbital and frontal scalp   contusion/hematoma. 5. Senescent changes. 6. CT CERVICAL SPINE: No cervical fracture or acute traumatic subluxation. 7. Degenerative changes involving the cervical spine described above. 8. C4-5 cervical spinal canal stenosis of mild severity resulting from   posterior disc bulge/nucleus pulposis. 9. Emphysema. Note that if pain persists or worsens, or if clinically there is concern for   CT occult acute cervical abnormality, flexion/extension C-spine series or MRI   cervical spine may be considered for additional evaluation.          CT FACIAL BONES WO CONTRAST Final Result   1. CT MAXILLOFACIAL BONES: LEFT orbital medial wall compression fracture   without extraocular muscle involvement   2. Prominent central and left periorbital and frontal scalp   contusion/hematoma. 3. CT HEAD: No acute intracranial abnormality. 4. Prominent central and left supraorbital and frontal scalp   contusion/hematoma. 5. Senescent changes. 6. CT CERVICAL SPINE: No cervical fracture or acute traumatic subluxation. 7. Degenerative changes involving the cervical spine described above. 8. C4-5 cervical spinal canal stenosis of mild severity resulting from   posterior disc bulge/nucleus pulposis. 9. Emphysema. Note that if pain persists or worsens, or if clinically there is concern for   CT occult acute cervical abnormality, flexion/extension C-spine series or MRI   cervical spine may be considered for additional evaluation. No results found. PROCEDURES   Unless otherwise noted below, none     Procedures    CRITICAL CARE TIME   N/A    CONSULTS:  None      EMERGENCY DEPARTMENT COURSE and DIFFERENTIAL DIAGNOSIS/MDM:   Vitals:    Vitals:    09/26/20 1740   BP: (!) 170/63   Pulse: 58   Resp: 18   Temp: 98.1 °F (36.7 °C)   SpO2: 94%   Weight: 120 lb (54.4 kg)   Height: 5' 1\" (1.549 m)       Patient was given the following medications:  Medications   Tetanus-Diphth-Acell Pertussis (BOOSTRIX) injection 0.5 mL (0.5 mLs Intramuscular Not Given 9/26/20 1956)   HYDROcodone-acetaminophen (NORCO) 5-325 MG per tablet 1 tablet (1 tablet Oral Given 9/26/20 1955)   iopamidol (ISOVUE-370) 76 % injection 75 mL (75 mLs Intravenous Given 9/26/20 2114)           This patient presents to the emergency department with facial injuries and left anterior chest wall pain and bruising to bilateral hands status post mechanical fall. She denies loss of consciousness and is both neurologically and neurovascularly intact. CT chest abdomen and pelvis is unremarkable.   CT facial bones does confirm a left orbital fracture. EOM intact without any acute visual disturbance, evidence of retinal detachment or other eye injury. CT scan shows remote injuries to the right ribs and chronic compression fracture to L1 without any significant change. Patient does have bruising to bilateral hands however does not have any pain or decrease in range of motion, sensation or strength. She declines x-rays of bilateral hands.   My suspicion is low for skull fracture, cerebellar compromise, posterior stroke,conjunctivitis, blepharitis, dacryocystitis, acute angle-closure glaucoma, corneal abrasion, ulcer, infiltrate, dendritic, rust ring, foreign body, MS, optic neuritis, periorbital or orbital cellulitis, stye, herpes ophthalmicus, Josephine Murray syndrome, retinal detachment, acute ophthalmic arterial or venous compromise,carotid dissection, sinus abscess, acute fracture, acute CVA, ICH, SAH, TIA, meningitis, encephalitis, pseudotumor cerebri, temporal arteritis, sentinel bleed from ruptured aneurysm, hypertensive urgency or emergency, subdural hematoma, epidural hematoma, acute spine fracture or dislocation, epidural abscess or hematoma, discitis, meningitis, encephalitis, transverse myelitis, pyelonephritis, perinephric abscess, urosepsis, kidney stone, AAA, dissection, shingles, acute intrathoracic/retroperitoneal/intra-abdominal injury/laceration/hematoma, pneumonia, pneumothorax, ACS, PE, myocarditis, pericarditis, endocarditis, acute pulmonary edema, pleural effusion, pericardial effusion, cardiac tamponade, cardiomyopathy, CHF exacerbation, thoracic aortic dissection, esophageal rupture, other life-threatening arrhythmia, hypertensive urgency or emergency, hemothorax, pulmonary contusion, subcutaneous emphysema, flail chest, pneumo mediastinum, acute rib fracture subungual hematoma, paronychia, eponychia, felon, flexor tenosynovitis,  thoracic outlet obstruction, SVC syndrome, foreign body, tendon rupture, compartment syndrome, acute fracture, dislocation, DVT, arterial compromise or occlusion, limb ischemia, gout, septic joint, abscess, cellulitis, osteomyelitis,or other concerning pathology. She was referred to OMFS with Ethel Emmanuel to follow up with specialist and PCP for recheck and may return to ED per discharge instructions. We have addressed her concerns and expectations. FINAL IMPRESSION      1. Closed fracture of left orbit, initial encounter ([facility] [address]    2. Abrasion of face, initial encounter    3. Contusion of lip, initial encounter    4. Contusion of hand, unspecified laterality, initial encounter    5. Left-sided chest wall pain          DISPOSITION/PLAN   DISPOSITION Decision To Discharge 09/26/2020 11:18:53 PM      PATIENT REFERREDTO:  Tam Joyner MD  [address] [place] [address]  [phone]    In 3 days      [facility] Emergency Department  [facility]  [phone]    If symptoms worsen      [address] [facility] [address], [address]  Phone: [phone]          DISCHARGE MEDICATIONS:  Discharge Medication List as of 9/26/2020 11:37 PM      START taking these medications    Details   HYDROcodone-acetaminophen (NORCO) 5-325 MG per tablet Take 1 tablet by mouth every 6 hours as needed for Pain for up to 3 days. , Disp-10 tablet,R-0Print             DISCONTINUED MEDICATIONS:  Discharge Medication List as of 9/26/2020 11:37 PM                 (Please note that portions of this note were completed with a voice recognition program.  Efforts were made to edit the dictations but occasionally words are mis-transcribed.)    Yuni Maravilla PA-C (electronically signed)           Yuni Maravilla PA-C  09/27/20 0107

## 2020-09-27 NOTE — ED PROVIDER NOTES
This patient was seen by the Mid-Level Provider. I have seen and examined the patient, agree with the workup, evaluation, management and diagnosis. Care plan has been discussed. My assessment reveals a 24-year-old female who presents after a fall. The patient presents after mechanical fall while walking her dog being pulled by the leash. She presents with some right wrist pain, pain around her left breast area and facial pain. She denies any visual disturbances. She denies any loss of consciousness. Radiology results:    CT CHEST ABDOMEN PELVIS W CONTRAST   Preliminary Result   No acute traumatic injury of the chest, abdomen or pelvis. Sequela of remote injury of multiple right ribs as well as the right clavicle. Chronic burst compression fracture of L1 with 8 mm of retropulsion or   fracture fragments, exaggerated kyphosis at this level and severe spinal   canal stenosis. No significant change since 07/02/2019 lumbar spine   radiographs. No evidence of acute fracture. Mild centrilobular emphysema in the lungs. CT CERVICAL SPINE WO CONTRAST   Final Result   1. CT MAXILLOFACIAL BONES: LEFT orbital medial wall compression fracture   without extraocular muscle involvement   2. Prominent central and left periorbital and frontal scalp   contusion/hematoma. 3. CT HEAD: No acute intracranial abnormality. 4. Prominent central and left supraorbital and frontal scalp   contusion/hematoma. 5. Senescent changes. 6. CT CERVICAL SPINE: No cervical fracture or acute traumatic subluxation. 7. Degenerative changes involving the cervical spine described above. 8. C4-5 cervical spinal canal stenosis of mild severity resulting from   posterior disc bulge/nucleus pulposis. 9. Emphysema.    Note that if pain persists or worsens, or if clinically there is concern for   CT occult acute cervical abnormality, flexion/extension C-spine series or MRI   cervical spine may be considered for additional evaluation. CT HEAD WO CONTRAST   Final Result   1. CT MAXILLOFACIAL BONES: LEFT orbital medial wall compression fracture   without extraocular muscle involvement   2. Prominent central and left periorbital and frontal scalp   contusion/hematoma. 3. CT HEAD: No acute intracranial abnormality. 4. Prominent central and left supraorbital and frontal scalp   contusion/hematoma. 5. Senescent changes. 6. CT CERVICAL SPINE: No cervical fracture or acute traumatic subluxation. 7. Degenerative changes involving the cervical spine described above. 8. C4-5 cervical spinal canal stenosis of mild severity resulting from   posterior disc bulge/nucleus pulposis. 9. Emphysema. Note that if pain persists or worsens, or if clinically there is concern for   CT occult acute cervical abnormality, flexion/extension C-spine series or MRI   cervical spine may be considered for additional evaluation. CT FACIAL BONES WO CONTRAST   Final Result   1. CT MAXILLOFACIAL BONES: LEFT orbital medial wall compression fracture   without extraocular muscle involvement   2. Prominent central and left periorbital and frontal scalp   contusion/hematoma. 3. CT HEAD: No acute intracranial abnormality. 4. Prominent central and left supraorbital and frontal scalp   contusion/hematoma. 5. Senescent changes. 6. CT CERVICAL SPINE: No cervical fracture or acute traumatic subluxation. 7. Degenerative changes involving the cervical spine described above. 8. C4-5 cervical spinal canal stenosis of mild severity resulting from   posterior disc bulge/nucleus pulposis. 9. Emphysema. Note that if pain persists or worsens, or if clinically there is concern for   CT occult acute cervical abnormality, flexion/extension C-spine series or MRI   cervical spine may be considered for additional evaluation.                LABS:    Labs Reviewed   BASIC METABOLIC PANEL - Abnormal; Notable for the following components: Result Value    BUN 25 (*)     All other components within normal limits    Narrative:     Performed at:  [facility]  [address],  [facility], [address]   Phone [phone]   CBC WITH AUTO DIFFERENTIAL - Abnormal; Notable for the following components:    RBC 3.73 (*)     MCH 34.2 (*)     All other components within normal limits    Narrative:     Performed at:  [facility]  [address],  [facility], [address]   Phone [phone]               Exam:    Well-nourished female in no acute distress. HEENT exam showed a lot of ecchymosis and swelling especially of her left orbit. Neurologic she was alert and oriented with no focal findings. Medical decision makin-year-old female presents after mechanical fall. Her work-up is consistent with a left medial orbital wall fracture. The patient is neurologically intact with no abnormalities of her extraocular muscles. The patient be discharged to [facility] at [facility]. She will be given pain control as needed. She is to return if worse. Her tetanus are updated. FINAL IMPRESSION:    1. Closed fracture of left orbit, initial encounter ([facility] [address]    2. Abrasion of face, initial encounter    3. Contusion of lip, initial encounter    4. Contusion of hand, unspecified laterality, initial encounter    5.  Left-sided chest wall pain      Critical care time: 35 minutes of critical care time spent with this patient     Tarsha Lyn MD  [phone]

## 2020-09-28 ENCOUNTER — TELEPHONE (OUTPATIENT)
Dept: INTERNAL MEDICINE CLINIC | Age: 80
End: 2020-09-28

## 2020-09-29 ENCOUNTER — OFFICE VISIT (OUTPATIENT)
Dept: INTERNAL MEDICINE CLINIC | Age: 80
End: 2020-09-29
Payer: MEDICARE

## 2020-09-29 VITALS
TEMPERATURE: 97.2 F | HEART RATE: 51 BPM | BODY MASS INDEX: 23.05 KG/M2 | DIASTOLIC BLOOD PRESSURE: 78 MMHG | OXYGEN SATURATION: 98 % | SYSTOLIC BLOOD PRESSURE: 120 MMHG | WEIGHT: 122 LBS

## 2020-09-29 PROCEDURE — 90694 VACC AIIV4 NO PRSRV 0.5ML IM: CPT | Performed by: NURSE PRACTITIONER

## 2020-09-29 PROCEDURE — G0008 ADMIN INFLUENZA VIRUS VAC: HCPCS | Performed by: NURSE PRACTITIONER

## 2020-09-29 PROCEDURE — 99214 OFFICE O/P EST MOD 30 MIN: CPT | Performed by: NURSE PRACTITIONER

## 2020-09-29 ASSESSMENT — ENCOUNTER SYMPTOMS
COUGH: 0
CONSTIPATION: 0
NAUSEA: 0
SHORTNESS OF BREATH: 0
VOMITING: 0
WHEEZING: 0
DIARRHEA: 0
ABDOMINAL PAIN: 0

## 2020-09-29 NOTE — PROGRESS NOTES
Acute Office Visit  9/29/2020    SUBJECTIVE:    Patient ID: Ludin Alonso is a 79 y.o. female. Chief Complaint   Patient presents with   Desmond Linton outside while walking puppy     HPI: The patient presents to the office for an acute visit. Patient reports on 9/26/2020, she was walking her dog who pulled on the leash too hard when she fell and hit the cement curb with her face. She had bruising to the left eye, right wrist pain, left breast pain and lip pain. She denied loss of consciousness. Pt ER note states : \"CT chest abdomen and pelvis is unremarkable. CT facial bones does confirm a left orbital fracture. EOM intact without any acute visual disturbance, evidence of retinal detachment or other eye injury. CT scan shows remote injuries to the right ribs and chronic compression fracture to L1 without any significant change. \"    Broke her glasses and needs a replacement. She states she was unhappy with her eye doctor and wants another referral.    Taking ASA, but no other blood thinners. Referral to [address] [place] [address] provided in the ER- not scheduled. She was d/c'd with Norco- has not used this. Taking tylenol PRN with relief-did not take this today. She thinks her symptoms are improving. Able to open her left eye now, which she previously wasn't able to do in the ER. No chest pain, palpitations, shortness of breath, trouble breathing, lightheadedness, dizziness or blurred vision. No fevers/chills. ROM of wrists normal bilaterally. Allergies   Allergen Reactions    Levofloxacin Anxiety    Penicillins Swelling     Current Outpatient Medications   Medication Sig Dispense Refill    HYDROcodone-acetaminophen (NORCO) 5-325 MG per tablet Take 1 tablet by mouth every 6 hours as needed for Pain for up to 3 days.  10 tablet 0    alendronate (FOSAMAX) 70 MG tablet TAKE 1 TABLET BY MOUTH ONE TIME PER WEEK 12 tablet 3    nicotine (NICODERM CQ) 21 MG/24HR Place 1 patch onto the skin every 24 hours 90 patch 3    lisinopril (PRINIVIL;ZESTRIL) 40 MG tablet TAKE 1 TABLET BY MOUTH EVERY DAY 90 tablet 0    hydrochlorothiazide (HYDRODIURIL) 12.5 MG tablet TAKE 1 TABLET BY MOUTH DAILY AS NEEDED FOR SWELLING 90 tablet 1    aspirin 81 MG EC tablet Take 81 mg by mouth daily      amLODIPine (NORVASC) 5 MG tablet TAKE 1 TABLET EVERY DAY 90 tablet 1    sertraline (ZOLOFT) 100 MG tablet TAKE 1 TABLET BY MOUTH EVERY DAY 90 tablet 1    naproxen (NAPROSYN) 500 MG tablet Take 1 tablet by mouth 2 times daily as needed for Pain 60 tablet 0    propranolol (INDERAL) 40 MG tablet TAKE 1 TABLET TWICE A  tablet 4    albuterol sulfate HFA (PROVENTIL HFA) 108 (90 Base) MCG/ACT inhaler Inhale 2 puffs into the lungs every 6 hours as needed for Wheezing Any inhaler insurance will cover 1 Inhaler 1    simvastatin (ZOCOR) 20 MG tablet TAKE 1 TABLET NIGHTLY 90 tablet 3    tiZANidine (ZANAFLEX) 2 MG tablet Take 1 tablet by mouth 3 times daily as needed (pain) 30 tablet 0    docusate sodium (COLACE) 100 MG capsule Take 1 capsule by mouth 2 times daily as needed for Constipation 30 capsule 0    vitamin B-12 (CYANOCOBALAMIN) 1000 MCG tablet Take 1,000 mcg by mouth daily      cetirizine (ZYRTEC) 10 MG tablet Take 10 mg by mouth daily       No current facility-administered medications for this visit. Review of Systems   Constitutional: Negative for chills, fatigue, fever and unexpected weight change. Eyes: Negative for visual disturbance. Respiratory: Negative for cough, shortness of breath and wheezing. Cardiovascular: Negative for chest pain, palpitations and leg swelling. Gastrointestinal: Negative for abdominal pain, constipation, diarrhea, nausea and vomiting. Skin: Positive for wound. Negative for pallor and rash. Bruising on face and right hand   Neurological: Negative for dizziness, weakness, light-headedness, numbness and headaches.      OBJECTIVE:  /78   Pulse 51   Temp 97.2 °F (36.2 °C) (Temporal)   Wt 122 lb (55.3 kg)   SpO2 98%   BMI 23.05 kg/m²    Physical Exam  Vitals signs reviewed. Constitutional:       General: She is not in acute distress. Appearance: She is well-developed. She is not diaphoretic. HENT:      Head: Normocephalic and atraumatic. Eyes:      Pupils: Pupils are equal, round, and reactive to light. Cardiovascular:      Rate and Rhythm: Normal rate and regular rhythm. Pulmonary:      Effort: Pulmonary effort is normal. No respiratory distress. Breath sounds: Normal breath sounds. No wheezing or rales. Chest:      Chest wall: No tenderness. Abdominal:      General: Bowel sounds are normal.      Palpations: Abdomen is soft. Skin:     General: Skin is warm and dry. Comments: Left orbital swelling improved, but still present. Able to open eyes bilaterally. Reports vision is stable from before fall. Bruising to the right wrist.   Bruising around bilateral eyes. Abrasion to the left side of the nose. EOM normal bilateral.  PERRLA     Neurological:      General: No focal deficit present. Mental Status: She is alert and oriented to person, place, and time. Cranial Nerves: No cranial nerve deficit. Sensory: No sensory deficit. Motor: No weakness. Coordination: Coordination normal.      Gait: Gait normal.   Psychiatric:         Mood and Affect: Mood normal.        ASSESSMENT/PLAN:  Og Cruz was seen today for fall. Diagnoses and all orders for this visit:    At high risk for falls/orbital fracture   - ER notes and imaging reviewed. Neuro exam normal.    - Vision per baseline. Needs new glasses since hers broke and she wants a new optometrist. Referral placed. - No anaya sign present   - Pain stable. Has not taken tylenol today. Recommended symptomatic management.    - Recommended pt schedule with [address] [place] [address] for further treatment.    - Recommended PT referral for stability/fall prevetion.  Pt

## 2020-10-01 RX ORDER — SIMVASTATIN 20 MG
TABLET ORAL
Qty: 90 TABLET | Refills: 3 | Status: SHIPPED | OUTPATIENT
Start: 2020-10-01 | End: 2021-09-07

## 2020-10-26 ENCOUNTER — TELEPHONE (OUTPATIENT)
Dept: INTERNAL MEDICINE CLINIC | Age: 80
End: 2020-10-26

## 2020-10-26 RX ORDER — LISINOPRIL 40 MG/1
TABLET ORAL
Qty: 90 TABLET | Refills: 3 | Status: SHIPPED | OUTPATIENT
Start: 2020-10-26 | End: 2021-10-14

## 2020-10-26 NOTE — TELEPHONE ENCOUNTER
Pt calling for refill of Lisinopril 40 mg---90 day supply---please send to [facility] . Thanks.

## 2020-11-04 RX ORDER — SERTRALINE HYDROCHLORIDE 100 MG/1
TABLET, FILM COATED ORAL
Qty: 90 TABLET | Refills: 1 | Status: SHIPPED | OUTPATIENT
Start: 2020-11-04 | End: 2021-04-19

## 2020-12-18 RX ORDER — AMLODIPINE BESYLATE 5 MG/1
TABLET ORAL
Qty: 90 TABLET | Refills: 1 | Status: SHIPPED | OUTPATIENT
Start: 2020-12-18 | End: 2021-06-11

## 2020-12-21 ENCOUNTER — VIRTUAL VISIT (OUTPATIENT)
Dept: INTERNAL MEDICINE CLINIC | Age: 80
End: 2020-12-21
Payer: MEDICARE

## 2020-12-21 VITALS
BODY MASS INDEX: 23.22 KG/M2 | WEIGHT: 123 LBS | SYSTOLIC BLOOD PRESSURE: 140 MMHG | DIASTOLIC BLOOD PRESSURE: 72 MMHG | HEIGHT: 61 IN | HEART RATE: 46 BPM

## 2020-12-21 PROCEDURE — G0438 PPPS, INITIAL VISIT: HCPCS | Performed by: INTERNAL MEDICINE

## 2020-12-21 ASSESSMENT — PATIENT HEALTH QUESTIONNAIRE - PHQ9
SUM OF ALL RESPONSES TO PHQ QUESTIONS 1-9: 0
1. LITTLE INTEREST OR PLEASURE IN DOING THINGS: 0
SUM OF ALL RESPONSES TO PHQ9 QUESTIONS 1 & 2: 0
2. FEELING DOWN, DEPRESSED OR HOPELESS: 0

## 2020-12-21 ASSESSMENT — LIFESTYLE VARIABLES
HOW OFTEN DO YOU HAVE A DRINK CONTAINING ALCOHOL: 4
HAVE YOU OR SOMEONE ELSE BEEN INJURED AS A RESULT OF YOUR DRINKING: 0
HOW MANY STANDARD DRINKS CONTAINING ALCOHOL DO YOU HAVE ON A TYPICAL DAY: 0
AUDIT TOTAL SCORE: 4
HOW OFTEN DURING THE LAST YEAR HAVE YOU BEEN UNABLE TO REMEMBER WHAT HAPPENED THE NIGHT BEFORE BECAUSE YOU HAD BEEN DRINKING: 0
HAS A RELATIVE, FRIEND, DOCTOR, OR ANOTHER HEALTH PROFESSIONAL EXPRESSED CONCERN ABOUT YOUR DRINKING OR SUGGESTED YOU CUT DOWN: 0
HOW OFTEN DURING THE LAST YEAR HAVE YOU FAILED TO DO WHAT WAS NORMALLY EXPECTED FROM YOU BECAUSE OF DRINKING: 0
AUDIT-C TOTAL SCORE: 4
HOW OFTEN DURING THE LAST YEAR HAVE YOU NEEDED AN ALCOHOLIC DRINK FIRST THING IN THE MORNING TO GET YOURSELF GOING AFTER A NIGHT OF HEAVY DRINKING: 0
HOW OFTEN DURING THE LAST YEAR HAVE YOU HAD A FEELING OF GUILT OR REMORSE AFTER DRINKING: 0
HOW OFTEN DURING THE LAST YEAR HAVE YOU FOUND THAT YOU WERE NOT ABLE TO STOP DRINKING ONCE YOU HAD STARTED: 0
HOW OFTEN DO YOU HAVE SIX OR MORE DRINKS ON ONE OCCASION: 0

## 2020-12-21 NOTE — PROGRESS NOTES
Medicare Annual Wellness Visit  Name: Elly Dennison Date: 2020   MRN: [id number] Sex: Female   Age: 79 y.o. Ethnicity: Non-/Non    : 1940 Race: Anamika Neves is here for Medicare AWV, Fall (Pt has had a couple falls and has hit head but nothing serious. ), and Fatigue (Pt c/o feeling very weak and have very little energy )    Screenings for behavioral, psychosocial and functional/safety risks, and cognitive dysfunction are all negative except as indicated below. These results, as well as other patient data from the [address] form, are documented in Flowsheets linked to this Encounter. Allergies   Allergen Reactions    Levofloxacin Anxiety    Penicillins Swelling       Prior to Visit Medications    Medication Sig Taking?  Authorizing Provider   amLODIPine (NORVASC) 5 MG tablet TAKE 1 TABLET BY MOUTH EVERY DAY Yes YOSEF Taylor CNP   sertraline (ZOLOFT) 100 MG tablet TAKE 1 TABLET BY MOUTH EVERY DAY Yes YOSEF Zuniga CNP   lisinopril (PRINIVIL;ZESTRIL) 40 MG tablet TAKE 1 TABLET BY MOUTH EVERY DAY Yes Lisy Leblanc MD   simvastatin (ZOCOR) 20 MG tablet TAKE 1 TABLET NIGHTLY Yes Lisy Leblanc MD   alendronate (FOSAMAX) 70 MG tablet TAKE 1 TABLET BY MOUTH ONE TIME PER WEEK Yes YOSEF Zuniga CNP   hydrochlorothiazide (HYDRODIURIL) 12.5 MG tablet TAKE 1 TABLET BY MOUTH DAILY AS NEEDED FOR SWELLING Yes Lisy Leblanc MD   aspirin 81 MG EC tablet Take 81 mg by mouth daily Yes Historical Provider, MD   propranolol (INDERAL) 40 MG tablet TAKE 1 TABLET TWICE A DAY Yes YOSEF Taylor CNP   albuterol sulfate HFA (PROVENTIL HFA) 108 (90 Base) MCG/ACT inhaler Inhale 2 puffs into the lungs every 6 hours as needed for Wheezing Any inhaler insurance will cover Yes Lisy Leblanc MD docusate sodium (COLACE) 100 MG capsule Take 1 capsule by mouth 2 times daily as needed for Constipation Yes Familia Bustos PA-C   vitamin B-12 (CYANOCOBALAMIN) 1000 MCG tablet Take 1,000 mcg by mouth daily Yes Historical Provider, MD   cetirizine (ZYRTEC) 10 MG tablet Take 10 mg by mouth daily Yes Historical Provider, MD   nicotine (NICODERM CQ) 21 MG/24HR Place 1 patch onto the skin every 24 hours  Patient not taking: Reported on 2020  Roselyn Guerra MD   naproxen (NAPROSYN) 500 MG tablet Take 1 tablet by mouth 2 times daily as needed for Pain  Patient not taking: Reported on 2020  Roselyn Guerra MD       Past Medical History:   Diagnosis Date    Asthma     Breast cyst     Essential tremor     HTN (hypertension)     Hyperlipidemia     Medical history reviewed with no changes     Ovarian cyst     Pneumonia     Stroke [facility])     mini       Past Surgical History:   Procedure Laterality Date    CATARACT REMOVAL       SECTION      HYSTERECTOMY  1981    INNER EAR SURGERY  2007    PAIN MANAGEMENT PROCEDURE Right 2020    RIGHT L1 AND L2 TRANSFORAMINAL EPIDURAL STEROID INJECTION WITH FLUOROSCOPY (32175, 45325) performed by Ramonita Carreon MD at [address] Right 2020    RIGHT L1 AND L2 TRANSFORAMINAL EPIDURAL STEROID INJECTION WITH FLUOROSCOPY (63347, 32129) performed by Ramonita Carreon MD at [address] Bilateral 2020    BILATERAL L1, L2, L3 MEDIAL BRANCH BLOCK WITH FLUOROSCOPY (36604, 55324)  #1 performed by Ramonita Carreon MD at [address] Bilateral 2020    BILATERAL L1 ,L2,L3 MEDIAL BRANCH BLOCKS WITH FLUOROSCOPY performed by Ramonita Carreon MD at [address] Bilateral 2020    BILATERAL L1, L2, L3 RADIOFREQUENCY ABLATION WITH FLUOROSCOPY performed by Ramonita Carreon MD at [address] Problem Relation Age of Onset    Heart Disease Mother     Stroke Mother     Heart Disease Father     High Blood Pressure Father     Mental Illness Father        CareTeam (Including outside providers/suppliers regularly involved in providing care):   Patient Care Team:  Prabha Reyes MD as PCP - General (Internal Medicine)  Prabha Reyes MD as PCP [facility]  Merari Murray MD as Consulting Physician (General Surgery)    Wt Readings from Last 3 Encounters:   12/21/20 123 lb (55.8 kg)   09/29/20 122 lb (55.3 kg)   09/26/20 120 lb (54.4 kg)     Patient-Reported Vitals 9/10/2020   Patient-Reported Weight 54.9kg   Patient-Reported Height 1.549m   Patient-Reported Pulse 74   Patient-Reported Temperature 97.6      Body mass index is 23.24 kg/m². Based upon direct observation of the patient, evaluation of cognition reveals recent and remote memory intact. Patient's complete Health Risk Assessment and screening values have been reviewed and are found in Flowsheets. The following problems were reviewed today and where indicated follow up appointments were made and/or referrals ordered. Positive Risk Factor Screenings with Interventions:     Fall Risk:  2 or more falls in past year?: (!) yes  Fall with injury in past year?: (!) yes  Fall Risk Interventions:    · very weak and falling. She agrees to use her walker routinely       Substance History:  Social History     Tobacco History     Smoking Status  Current Every Day Smoker Smoking Frequency  1 pack/day for 50 years (50 pk yrs) Smoking Tobacco Type  Cigarettes    Smokeless Tobacco Use  Never Used          Alcohol History     Alcohol Use Status  Yes Comment  very rare sometimes a glass of wine          Drug Use     Drug Use Status  No          Sexual Activity     Sexually Active  Yes Partners  Male Comment                 Alcohol Screening:   Audit-C Score: 4  Total Score: 4 A score of 8 or more is associated with harmful or hazardous drinking. A score of 13 or more in women, and 15 or more in men, is likely to indicate alcohol dependence. Substance Abuse Interventions:  · Tobacco abuse:  tobacco cessation tips and resources provided. Has nicotine patches. She will start using them after the holidays. General Health and ACP:  General  In general, how would you say your health is?: Fair  In the past 7 days, have you experienced any of the following? New or Increased Pain, New or Increased Fatigue, Loneliness, Social Isolation, Stress or Anger?: (!) New or Increased Fatigue, Social Isolation  Do you get the social and emotional support that you need?: Yes  Do you have a Living Will?: Yes  Advance Directives     Power of  Living Will ACP-Advance Directive ACP-Power of     Not on File Not on File Not on File Not on File      General Health Risk Interventions:  · Social isolation: COVID related. She is staying with family.     Health Habits/Nutrition:  Health Habits/Nutrition  Do you exercise for at least 20 minutes 2-3 times per week?: (!) No  Have you lost any weight without trying in the past 3 months?: No  Do you eat fewer than 2 meals per day?: No  Have you seen a dentist within the past year?: Yes  Body mass index: 23.24  Health Habits/Nutrition Interventions:  · Daily exercises recommended based on therapy recommendations    Hearing/Vision:  No exam data present  Hearing/Vision  Do you or your family notice any trouble with your hearing?: Kiera Antis hearing aids.)  Do you have difficulty driving, watching TV, or doing any of your daily activities because of your eyesight?: No  Have you had an eye exam within the past year?: Yes  Hearing/Vision Interventions:  · N/A     ADL:  ADLs In the past 7 days, did you need help from others to perform any of the following everyday activities? Eating, dressing, grooming, bathing, toileting, or walking/balance?: (!) Walking/Balance  In the past 7 days, did you need help from others to take care of any of the following? Laundry, housekeeping, banking/finances, shopping, telephone use, food preparation, transportation, or taking medications?: (!) Transportation  ADL Interventions:  · she gets all the support that she needs from family    Personalized Preventive Plan   Current Health Maintenance Status  Immunization History   Administered Date(s) Administered    Influenza 11/26/2013    Influenza, High Dose (Fluzone 65 yrs and older) 01/11/2018, 10/01/2018    Influenza, Quadv, adjuvanted, 65 yrs +, IM, PF (Fluad) 09/29/2020    Influenza, Triv, inactivated, subunit, adjuvanted, IM (Fluad 65 yrs and older) 11/12/2019    Pneumococcal Conjugate 13-valent ([id number]) 12/27/2016    Pneumococcal Polysaccharide ([id number]) 09/19/2014    Tdap (Boostrix, Adacel) 04/22/2019        Health Maintenance   Topic Date Due    Shingles Vaccine (1 of 2) 08/15/1989    Annual Wellness Visit (AWV)  05/29/2019    Lipid screen  02/19/2021    Potassium monitoring  09/26/2021    Creatinine monitoring  09/26/2021    DTaP/Tdap/Td vaccine (2 - Td) 04/22/2029    DEXA (modify frequency per FRAX score)  Completed    Flu vaccine  Completed    Pneumococcal 65+ years Vaccine  Completed    Hepatitis A vaccine  Aged Out    Hepatitis B vaccine  Aged Out    Hib vaccine  Aged Out    Meningococcal (ACWY) vaccine  Aged Out     Recommendations for [place] Due: see orders and patient instructions/AVS.  . Recommended screening schedule for the next 5-10 years is provided to the patient in written form: see Patient Thalia Yu was seen today for medicare awv, fall and fatigue.     Diagnoses and all orders for this visit: Routine general medical examination at a health care facility               Ra Krishna is a 79 y.o. female being evaluated by a Virtual Visit (video and audio) encounter to address concerns as mentioned above. A caregiver was present when appropriate. Due to this being a TeleHealth encounter (During VXPJD-60 public health emergency), evaluation of the following organ systems was limited: Vitals/Constitutional/EENT/Resp/CV/GI//MS/Neuro/Skin/Heme-Lymph-Imm. Pursuant to the emergency declaration under the [address] [place] [address] authority and the [employer] Act, this Virtual Visit was conducted with patient's (and/or legal guardian's) consent, to reduce the patient's risk of exposure to COVID-19 and provide necessary medical care. The patient (and/or legal guardian) has also been advised to contact this office for worsening conditions or problems, and seek emergency medical treatment and/or call 911 if deemed necessary. Patient identification was verified at the start of the visit: Yes    Services were provided through a video synchronous discussion virtually to substitute for in-person clinic visit. Patient and provider were located at their individual homes. --Mark Altamirano MD on 12/21/2020 at 9:29 AM    An electronic signature was used to authenticate this note.

## 2020-12-21 NOTE — PATIENT INSTRUCTIONS
Personalized Preventive Plan for Maikel Morales - 12/21/2020  Medicare offers a range of preventive health benefits. Some of the tests and screenings are paid in full while other may be subject to a deductible, co-insurance, and/or copay. Some of these benefits include a comprehensive review of your medical history including lifestyle, illnesses that may run in your family, and various assessments and screenings as appropriate. After reviewing your medical record and screening and assessments performed today your provider may have ordered immunizations, labs, imaging, and/or referrals for you. A list of these orders (if applicable) as well as your Preventive Care list are included within your After Visit Summary for your review. Other Preventive Recommendations:    · A preventive eye exam performed by an eye specialist is recommended every 1-2 years to screen for glaucoma; cataracts, macular degeneration, and other eye disorders. · A preventive dental visit is recommended every 6 months. · Try to get at least 150 minutes of exercise per week or 10,000 steps per day on a pedometer . · Order or download the FREE \"Exercise & Physical Activity: Your Everyday Guide\" from [employer]. Call 9-743.112.2747 or search [employer] online. · You need 1074-7671 mg of calcium and 3357-0501 IU of vitamin D per day. It is possible to meet your calcium requirement with diet alone, but a vitamin D supplement is usually necessary to meet this goal.  · When exposed to the sun, use a sunscreen that protects against both UVA and UVB radiation with an SPF of 30 or greater. Reapply every 2 to 3 hours or after sweating, drying off with a towel, or swimming. · Always wear a seat belt when traveling in a car. Always wear a helmet when riding a bicycle or motorcycle.

## 2021-01-18 RX ORDER — HYDROCHLOROTHIAZIDE 12.5 MG/1
TABLET ORAL
Qty: 90 TABLET | Refills: 1 | Status: SHIPPED | OUTPATIENT
Start: 2021-01-18 | End: 2021-07-15

## 2021-03-22 ENCOUNTER — TELEPHONE (OUTPATIENT)
Dept: INTERNAL MEDICINE CLINIC | Age: 81
End: 2021-03-22

## 2021-03-22 DIAGNOSIS — R26.89 IMBALANCE: Primary | ICD-10-CM

## 2021-03-22 NOTE — TELEPHONE ENCOUNTER
Pt calling had been doing PT but had to stop---now she would like to go back to it---can you order for ---please call the pt. Thanks.

## 2021-03-22 NOTE — TELEPHONE ENCOUNTER
It looks like she was referred by Ortho for this. Can you learn more about why she was referred for physical therapy?

## 2021-03-29 ENCOUNTER — HOSPITAL ENCOUNTER (OUTPATIENT)
Dept: PHYSICAL THERAPY | Age: 81
Setting detail: THERAPIES SERIES
Discharge: HOME | End: 2021-03-29
Payer: MEDICARE

## 2021-03-29 PROCEDURE — 97530 THERAPEUTIC ACTIVITIES: CPT

## 2021-03-29 PROCEDURE — 97161 PT EVAL LOW COMPLEX 20 MIN: CPT

## 2021-03-29 NOTE — PLAN OF CARE
[address] [address], [facility] [address]  Phone: [phone]   Fax: [phone]                                                       Physical Therapy Certification    Dear Referring Practitioner: Miriam Andrew,    We had the pleasure of evaluating the following patient for physical therapy services at [address] [place] [address]. A summary of our findings can be found in the initial assessment below. This includes our plan of care. If you have any questions or concerns regarding these findings, please do not hesitate to contact me at the office phone number checked above. Thank you for the referral.       Physician Signature:_______________________________Date:__________________  By signing above (or electronic signature), therapists plan is approved by physician      Patient: Merline Schwartz   : 1940   MRN: [id number]  Referring Physician: Referring Practitioner: Miriam Andrew      Evaluation Date: 3/29/2021      Medical Diagnosis Information:  Diagnosis: Imbalance R26.89   Treatment Diagnosis: High fall risk, impaired gait mechanics, decreased balance, decreased LE strength, diminished proprioception                                         Insurance information: PT Insurance Information: Aetna Medicare     Precautions/ Contra-indications:   Latex Allergy:  [x]NO      []YES  Preferred Language for Healthcare:   [x]English       []Other:    C-SSRS Triggered by Intake questionnaire (Past 2 wk assessment ):   [x] No, Questionnaire did not trigger screening.   [] Yes, Patient intake triggered C-SSRS Screening     [] Completed, no further action required. [] Completed, PCP notified via Epic    SUBJECTIVE: Patient stated complaint: Pt referred for falls/imbalance.   Had PT for 4-5 visits in /2020 for same issue and PT thought something was wrong with her back, so went for a f/u with Dr Jenae Becker. At the time, had 2 falls in a month. 4-5 years ago, she noticed the hearing in her left ear got worse, requiring a hearing aide. Has had hearing loss in right ear all her life. Balance started declining about 4 years ago. Had a bad fall in 2019 that required inpatient rehab about 3 weeks ago. Also has history of back issues, had a L1/L2 epidural steroid injection and a medial branch block in July 2020, and an L1/2/3 radiofrequency ablation in Aug 2020. Had a fall on  9/29/20 while walking her dog and fractured her orbital bone. Has continued to have frequent falls, 2 have been close together. She has recently tripped going up the steps, and another. Says that her legs are very weak at times. Relevant Medical History:  bilateral hearing loss (complete in right ear, hearing aide in left ear), syncope episodes, HTN, lumbar stenosis with neurogenic claudication, lumbar spondylosis   Functional Outcome: raw score = 25; dysfunction = 60-79%     Pain Scale: 4/10 since the fall, but mostly no issues   Easing factors:   Provocative factors:      Type: []Constant   [x]Intermittent  []Radiating []Localized [x]Other:  Pain mostly since fall      Numbness/Tingling: yes, states N/T in arms/legs    Occupation/School:      Living Status/Prior Level of Function:Prior to this injury / incident, pt was independent with ADLs and IADLs,  Lives with daughter and son-in-law in a 2 story home, pt's bedroom is on top floor, 1 railing. Requires hand-held assist by family. Was told by MD to use the RW but she doesn't use it. Has a SPC and RW both, walk-in shower, raised toilet seat, bed rail.   Mostly sedentary, enjoys playing games and reading on tablet     OBJECTIVE:     Palpation:     Functional Mobility/Transfers: SBA/CGA      Posture: forward flexed posture     Bandages/Dressings/Incisions:     Gait: (include devices/WB status):  Guided back to treatment room handheld assist by daughter Dermatomes Normal Abnormal Comments   inguinal area (L1)  x     anterior mid-thigh (L2) x     distal ant thigh/med knee (L3) x     medial lower leg and foot (L4) x     lateral lower leg and foot (L5) x     posterior calf (S1) x     medial calcaneus (S2) x         Reflexes Normal Abnormal Comments   S1-2 Seated achilles x     S1-2 Prone knee bend      L3-4 Patellar tendon      Clonus      Babinski           PROM AROM    L R L R   Hip Flexion       Hip Abduction       Hip ER       Hip IR       Knee Flexion       Knee Extension       Dorsiflexion        Plantarflexion        Inversion        Eversion            Strength (0-5) / Myotomes Left Right   Hip Flexion - supine     Hip Flexion - seated (L1-2) 4 4   Hip Abduction 4- 4-   Hip Adduction     Hip ER 4- 4-   Hip IR 4- 4-   Quads (L2-4) 4+ 4+   Hamstrings 4+ 4+   Ankle Dorsiflexion (L4-5) 5 5   Ankle Plantarflexion (S1-2)     Ankle Inversion     Ankle Eversion (S1-2)     Great Toe Extension (L5)          Flexibility     Hamstrings (90/90)     ITB Dara Eligio)     Quads (Ely's)     Hip Flexor Marolyn Orn)          Girth     Mid patella     Suprapatellar     Figure 8     Transmalleolar     Metatarsal Heads         Joint mobility:    []Normal    []Hypo   []Hyper    Orthopaedic Special Tests  Positive  Negative  NT Comments    Hip       SLIM / Gb's       FADIR       Scour       Trendelenburg              Knee       Lachman's / Anterior Drawer       Posterior Drawer       Varus Stress       Valgus Stress       Lcuian's        Appley's       Thessaly's       Patellar Tracking              Ankle       Anterior Drawer       Talar Tilt       Posey       Nikolas's                   Balance:  Narrow RICHIE eyes open: 30 secs, Semi-tandem: 18 secs bilaterally. Pt very fearful of falling     TU secs    180* turns, very slow                       [x] Patient history, allergies, meds reviewed. Medical chart reviewed. See intake form.      Review Of Systems (ROS):  [x]Performed Review of systems (Integumentary, CardioPulmonary, Neurological) by intake and observation. Intake form has been scanned into medical record. Patient has been instructed to contact their primary care physician regarding ROS issues if not already being addressed at this time.       Co-morbidities/Complexities (which will affect course of rehabilitation):   []None        []Hx of COVID   Arthritic conditions   []Rheumatoid arthritis (M05.9)  []Osteoarthritis (M19.91)  []Gout   Cardiovascular conditions   []Hypertension (I10)  []Hyperlipidemia (E78.5)  []Angina pectoris (I20)  []Atherosclerosis (I70)  []Pacemaker  []Hx of CABG/stent/  cardiac surgeries   Musculoskeletal conditions   []Disc pathology   []Congenital spine pathologies   []Osteoporosis (M81.8)  []Osteopenia (M85.8)  []Scoliosis       Endocrine conditions   []Hypothyroid (E03.9)  []Hyperthyroid Gastrointestinal conditions   []Constipation (D98.54)   Metabolic conditions   []Morbid obesity (E66.01)  []Diabetes type 1(E10.65) or 2 (E11.65)   []Neuropathy (G60.9)     Cardio/Pulmonary conditions   []Asthma (J45)  []Coughing   []COPD (J44.9)  []CHF  []A-fib   Psychological Disorders  []Anxiety (F41.9)  []Depression (F32.9)   []Other:   Developmental Disorders  []Autism (F84.0)  []CP (G80)  []Down Syndrome (Q90.9)  []Developmental delay     Neurological conditions  []Prior Stroke (I69.30)  []Parkinson's (G20)  []Encephalopathy (G93.40)  []MS (G35)  []Post-polio (G14)  []SCI  []TBI  []ALS Other conditions  []Fibromyalgia (M79.7)  []Vertigo  []Syncope  []Kidney Failure  []Cancer      []currently undergoing                treatment  []Pregnancy  []Incontinence   Prior surgeries  []involved limb  []previous spinal surgery  [] section birth  []hysterectomy  []bowel / bladder surgery  []other relevant surgeries   []Other:              Barriers to/and or personal factors that will affect rehab potential:              []Age  []Sex    []Smoker []Motivation/Lack of Motivation                        []Co-Morbidities              []Cognitive Function, education/learning barriers              []Environmental, home barriers              []profession/work barriers  []past PT/medical experience  []other:  Justification:     Falls Risk Assessment (30 days):   [x] Falls Risk assessed and no intervention required. [] Falls Risk assessed and Patient requires intervention due to being higher risk   TUG score (>12s at risk):     [] Falls education provided, including        ASSESSMENT:   Functional Impairments:     []Noted lumbar/proximal hip/LE joint hypomobility   []Decreased LE functional ROM   []Decreased core/proximal hip strength and neuromuscular control   []Decreased LE functional strength   []Reduced balance/proprioceptive control   []other:      Functional Activity Limitations (from functional questionnaire and intake)   [x]Reduced ability to tolerate prolonged functional positions   [x]Reduced ability or difficulty with changes of positions or transfers between positions   []Reduced ability to maintain good posture and demonstrate good body mechanics with sitting, bending, and lifting   []Reduced ability to sleep   [] Reduced ability or tolerance with driving and/or computer work   [x]Reduced ability to perform lifting, carrying tasks   []Reduced ability to squat   []Reduced ability to forward bend   [x]Reduced ability to ambulate prolonged functional periods/distances/surfaces   [x]Reduced ability to ascend/descend stairs   []Reduced ability to run, hop, cut or jump   []other:    Participation Restrictions   [x]Reduced participation in self care activities   [x]Reduced participation in home management activities   []Reduced participation in work activities   [x]Reduced participation in social activities. []Reduced participation in sport/recreation activities.     Classification :    []Signs/symptoms consistent with post-surgical status including decreased ROM, strength and function. []Signs/symptoms consistent with joint sprain/strain   []Signs/symptoms consistent with patella-femoral syndrome   []Signs/symptoms consistent with knee OA/hip OA   []Signs/symptoms consistent with internal derangement of knee/Hip   [x]Signs/symptoms consistent with functional hip weakness/NMR control      []Signs/symptoms consistent with tendinitis/tendinosis    []signs/symptoms consistent with pathology which may benefit from Dry needling      []other:      Prognosis/Rehab Potential:      []Excellent   []Good    [x]Fair   []Poor    Tolerance of evaluation/treatment:    []Excellent   []Good    [x]Fair   []Poor    Physical Therapy Evaluation Complexity Justification  [x] A history of present problem with:  [] no personal factors and/or comorbidities that impact the plan of care;  [x]1-2 personal factors and/or comorbidities that impact the plan of care  []3 personal factors and/or comorbidities that impact the plan of care  [x] An examination of body systems using standardized tests and measures addressing any of the following: body structures and functions (impairments), activity limitations, and/or participation restrictions;:  [x] a total of 1-2 or more elements   [] a total of 3 or more elements   [] a total of 4 or more elements   [x] A clinical presentation with:  [x] stable and/or uncomplicated characteristics   [] evolving clinical presentation with changing characteristics  [] unstable and unpredictable characteristics;   [x] Clinical decision making of [x] low, [] moderate, [] high complexity using standardized patient assessment instrument and/or measurable assessment of functional outcome.     [] EVAL (LOW) 29109 (typically 15 minutes face-to-face)  [] EVAL (MOD) 74574 (typically 30 minutes face-to-face)  [] EVAL (HIGH) 61314 (typically 45 minutes face-to-face)  [] RE-EVAL     PLAN:   Frequency/Duration:  2-3 days per week for 5 Weeks:  Interventions:  [x] Therapeutic exercise including: strength training, ROM, for Lower extremity and core   [x]  NMR activation and proprioception for LE, Glutes and Core   [x]  Manual therapy as indicated for LE, Hip and spine to include: Dry Needling/IASTM, STM, PROM, Gr I-IV mobilizations, manipulation. [x] Modalities as needed that may include: thermal agents, E-stim, Biofeedback, US, iontophoresis as indicated  [x] Patient education on joint protection, postural re-education, activity modification, progression of HEP. HEP instruction: Written HEP instructions provided and reviewed     GOALS:  Patient stated goal:  Strengthen legs and arms   [] Progressing: [] Met: [] Not Met: [] Adjusted     Therapist goals for Patient:    Short Term Goals: To be achieved in: 2 weeks  1. Independent in HEP and progression per patient tolerance, in order to prevent re-injury. []? Progressing: []? Met: []? Not Met: []? Adjusted  2. Patient will improve TUG by 2 sec to  facilitate improvement in movement, function, and ADLs as indicated by Functional Deficits. []? Progressing: []? Met: []? Not Met: []? Adjusted     Long Term Goals: To be achieved in: 6 weeks   1. Patient improve sit to stand test to 10 reps to assist with reaching prior level of function and reduce fall risk . []? Progressing: []? Met: []? Not Met: []? Adjusted  2. Patient will demonstrate an increase in  LE Strength to at least 4+/5 as well as good proximal hip strength and control to allow for proper functional mobility as indicated by patients Functional Deficits. []? Progressing: []? Met: []? Not Met: []? Adjusted  3. Patient to report 0 falls in a month period improving confidence with mobility. []? Progressing: []? Met: []? Not Met: []? Adjusted  4. Patient to ambulate with 4 wheeled walker on level and unlevel surfaces without loss of balance  []? Progressing: []? Met: []? Not Met: []?  Adjusted      5. 4. Patient will improve TUG test by 10 secs to reduce community and household fall risk   []? Progressing: []? Met: []? Not Met: []?  Adjusted            Electronically signed by:  Gen Yoon PT

## 2021-03-29 NOTE — FLOWSHEET NOTE
[address] [facility] Physical Therapy  Phone: [phone]   Fax: [phone]    Physical Therapy Daily Treatment Note  Date:  3/29/2021    Patient Name:  Erika Ng    :  1940  MRN: [id number]  Medical/Treatment Diagnosis Information:  · Diagnosis: Imbalance R26.89  · Treatment Diagnosis: High fall risk, impaired gait mechanics, decreased balance, decreased LE strength, diminished proprioception  Insurance/Certification information:  PT Insurance Information: Aetna Medicare  Physician Information:  Referring Practitioner: Ryan Ram  Plan of care signed (Y/N): []  Yes [x]  No     Date of Patient follow up with Physician:      Progress Report: []  Yes  [x]  No     Date Range for reporting period:  Beginning:  3/29   Ending:     Progress report due (10 Rx/or 30 days whichever is less): visit #79 or      Recertification due (POC duration/ or 90 days whichever is less): visit # 18    Visit # Insurance Allowable Auth required?  Date Range   -  [place] []  Yes  []  No      Latex Allergy:  [x]NO      []YES  Preferred Language for Healthcare:   [x]English       []other:    Functional Scale:        Date assessed:  LEFS: raw score = 25/80; dysfunction =    3/29     Pain level:  10     SUBJECTIVE:  See eval    OBJECTIVE: See eval      RESTRICTIONS/PRECAUTIONS:     Exercises/Interventions:     Therapeutic Exercises (56656) Resistance / level Sets/sec Reps Notes   Nustep       IB, HR/TR              Step-Ups              Seated there ex LE                            Therapeutic Activities (28291)                                          Neuromuscular Re-ed (71594)       Balance // Bars                                          Manual Intervention (79916)                                                     Modalities:     Pt. Education:  -patient educated on diagnosis, prognosis and expectations for rehab  -all patient questions were answered    Home Exercise Program:        Therapeutic Exercise and NMR EXR  [] (36302) Provided verbal/tactile cueing for activities related to strengthening, flexibility, endurance, ROM for improvements in  [] LE / Lumbar: LE, proximal hip, and core control with self care, mobility, lifting, ambulation. [] UE / Cervical: cervical, postural, scapular, scapulothoracic and UE control with self care, reaching, carrying, lifting, house/yardwork, driving, computer work.  [] (22326) Provided verbal/tactile cueing for activities related to improving balance, coordination, kinesthetic sense, posture, motor skill, proprioception to assist with   [] LE / lumbar: LE, proximal hip, and core control in self care, mobility, lifting, ambulation and eccentric single leg control. [] UE / cervical: cervical, scapular, scapulothoracic and UE control with self care, reaching, carrying, lifting, house/yardwork, driving, computer work.   [] (54789) Therapist is in constant attendance of 2 or more patients providing skilled therapy interventions, but not providing any significant amount of measurable one-on-one time to either patient, for improvements in  [] LE / lumbar: LE, proximal hip, and core control in self care, mobility, lifting, ambulation and eccentric single leg control. [] UE / cervical: cervical, scapular, scapulothoracic and UE control with self care, reaching, carrying, lifting, house/yardwork, driving, computer work.      NMR and Therapeutic Activities:    [] (62625 or 93948) Provided verbal/tactile cueing for activities related to improving balance, coordination, kinesthetic sense, posture, motor skill, proprioception and motor activation to allow for proper function of   [] LE: / Lumbar core, proximal hip and LE with self care and ADLs  [] UE / Cervical: cervical, postural, scapular, scapulothoracic and UE control with self care, carrying, lifting, driving, computer work.   [] (11548) Gait Re-education- Provided training and instruction to the patient for proper LE, core and proximal hip recruitment and positioning and eccentric body weight control with ambulation re-education including up and down stairs     Home Management Training / Self Care:  [] (76187) Provided self-care/home management training related to activities of daily living and compensatory training, and/or use of adaptive equipment for improvement with: ADLs and compensatory training, meal preparation, safety procedures and instruction in use of adaptive equipment, including bathing, grooming, dressing, personal hygiene, basic household cleaning and chores. Home Exercise Program:    [x] (28252) Reviewed/Progressed HEP activities related to strengthening, flexibility, endurance, ROM of   [] LE / Lumbar: core, proximal hip and LE for functional self-care, mobility, lifting and ambulation/stair navigation   [] UE / Cervical: cervical, postural, scapular, scapulothoracic and UE control with self care, reaching, carrying, lifting, house/yardwork, driving, computer work  [] (45737)Reviewed/Progressed HEP activities related to improving balance, coordination, kinesthetic sense, posture, motor skill, proprioception of   [] LE: core, proximal hip and LE for self care, mobility, lifting, and ambulation/stair navigation    [] UE / Cervical: cervical, postural,  scapular, scapulothoracic and UE control with self care, reaching, carrying, lifting, house/yardwork, driving, computer work    Manual Treatments:  PROM / STM / Oscillations-Mobs:  G-I, II, III, IV (PA's, Inf., Post.)  [] (63405) Provided manual therapy to mobilize LE, proximal hip and/or LS spine soft tissue/joints for the purpose of modulating pain, promoting relaxation,  increasing ROM, reducing/eliminating soft tissue swelling/inflammation/restriction, improving soft tissue extensibility and allowing for proper ROM for normal function with   [] LE / lumbar: self care, mobility, lifting and ambulation.     [] UE / Cervical: self care, reaching, carrying, lifting, house/yardwork, driving, computer work. Modalities:  [] (76271) Vasopneumatic compression: Utilized vasopneumatic compression to decrease edema / swelling for the purpose of improving mobility and quad tone / recruitment which will allow for increased overall function including but not limited to self-care, transfers, ambulation, and ascending / descending stairs. Charges:  Timed Code Treatment Minutes: 15   Total Treatment Minutes: 42     [x] EVAL - LOW (69265)   [] EVAL - MOD (98219)  [] EVAL - HIGH (76731)  [] RE-EVAL (29156)  [] TI(07869) x       [] Ionto  [] NMR (41646) x       [] Vaso  [] Manual (25752) x       [] Ultrasound  [x] TA x 2       [] Mech Traction (41765)  [] Aquatic Therapy x     [] ES (un) (49272):   [] Home Management Training x  [] ES(attended) (81152)   [] Dry Needling 1-2 muscles (71428):  [] Dry Needling 3+ muscles (617795)  [] Group:      [] Other:     GOALS:  Patient stated goal:  Strengthen legs and arms   []? Progressing: []? Met: []? Not Met: []? Adjusted     Therapist goals for Patient:    Short Term Goals: To be achieved in: 2 weeks  1. Independent in HEP and progression per patient tolerance, in order to prevent re-injury. []?? Progressing: []?? Met: []?? Not Met: []?? Adjusted  2. Patient will improve TUG by 2 sec to  facilitate improvement in movement, function, and ADLs as indicated by Functional Deficits. []?? Progressing: []?? Met: []?? Not Met: []?? Adjusted     Long Term Goals: To be achieved in: 6 weeks   1. Patient improve sit to stand test to 10 reps to assist with reaching prior level of function and reduce fall risk .   []?? Progressing: []?? Met: []?? Not Met: []?? Adjusted  2. Patient will demonstrate an increase in  LE Strength to at least 4+/5 as well as good proximal hip strength and control to allow for proper functional mobility as indicated by patients Functional Deficits.    []?? Progressing: []?? Met: []?? Not Met: []?? Adjusted  3. Patient to report 0 falls in a month period improving confidence with mobility.   []?? Progressing: []?? Met: []?? Not Met: []?? Adjusted  4. Patient to ambulate with 4 wheeled walker on level and unlevel surfaces without loss of balance  []? ? Progressing: []?? Met: []?? Not Met: []?? Adjusted      5. 4. Patient will improve TUG test by 10 secs to reduce community and household fall risk   []? ? Progressing: []?? Met: []?? Not Met: []?? Adjusted            Overall Progression Towards Functional goals/ Treatment Progress Update:  [] Patient is progressing as expected towards functional goals listed. [] Progression is slowed due to complexities/Impairments listed. [] Progression has been slowed due to co-morbidities. [x] Plan just implemented, too soon to assess goals progression <30days   [] Goals require adjustment due to lack of progress  [] Patient is not progressing as expected and requires additional follow up with physician  [] Other    Persisting Functional Limitations/Impairments:  []Sleeping []Sitting               [x]Standing [x]Transfers        [x]Walking []Kneeling               [x]Stairs []Squatting / bending   [x]ADLs [x]Reaching  []Lifting  []Housework  []Driving []Job related tasks  []Sports/Recreation []Other:        ASSESSMENT:  See eval  Treatment/Activity Tolerance:  [] Patient able to complete tx [] Patient limited by fatigue  [] Patient limited by pain  [] Patient limited by other medical complications  [] Other:     Prognosis: [] Good [] Fair  [] Poor    Patient Requires Follow-up: [x] Yes  [] No    Plan for next treatment session:  Gait, balance    PLAN: See eval. PT 2-3x / week for 6 weeks.    [] Continue per plan of care [] Alter current plan (see comments)  [x] Plan of care initiated [] Hold pending MD visit [] Discharge    Electronically signed by: Corinna Barcenas PT    Note: If patient does not return for scheduled/ recommended follow up visits, this note will serve as a discharge from care along with most recent update on progress.

## 2021-04-02 ENCOUNTER — HOSPITAL ENCOUNTER (OUTPATIENT)
Dept: PHYSICAL THERAPY | Age: 81
Setting detail: THERAPIES SERIES
Discharge: HOME | End: 2021-04-02
Payer: MEDICARE

## 2021-04-02 PROCEDURE — 97112 NEUROMUSCULAR REEDUCATION: CPT

## 2021-04-02 PROCEDURE — 97530 THERAPEUTIC ACTIVITIES: CPT

## 2021-04-02 PROCEDURE — 97110 THERAPEUTIC EXERCISES: CPT

## 2021-04-02 NOTE — FLOWSHEET NOTE
with use of quad cane, gait training with quad cane X 10 mins  200' x 2 trials                                  Neuromuscular Re-ed (45436)       Narrow RICHIE EO, EC  30\" X 2 each Supervision   Semi-Tandem RICHIE  20\" X 2 B    Cone TAPS   X 10 each Partial UE support                         Manual Intervention (21697)                                                     Modalities:     Pt. Education:  -patient educated on diagnosis, prognosis and expectations for rehab  -all patient questions were answered    Home Exercise Program:    Therapeutic Exercise and NMR EXR  [] (55839) Provided verbal/tactile cueing for activities related to strengthening, flexibility, endurance, ROM for improvements in  [] LE / Lumbar: LE, proximal hip, and core control with self care, mobility, lifting, ambulation. [] UE / Cervical: cervical, postural, scapular, scapulothoracic and UE control with self care, reaching, carrying, lifting, house/yardwork, driving, computer work.  [] (52348) Provided verbal/tactile cueing for activities related to improving balance, coordination, kinesthetic sense, posture, motor skill, proprioception to assist with   [] LE / lumbar: LE, proximal hip, and core control in self care, mobility, lifting, ambulation and eccentric single leg control. [] UE / cervical: cervical, scapular, scapulothoracic and UE control with self care, reaching, carrying, lifting, house/yardwork, driving, computer work.   [] (87775) Therapist is in constant attendance of 2 or more patients providing skilled therapy interventions, but not providing any significant amount of measurable one-on-one time to either patient, for improvements in  [] LE / lumbar: LE, proximal hip, and core control in self care, mobility, lifting, ambulation and eccentric single leg control. [] UE / cervical: cervical, scapular, scapulothoracic and UE control with self care, reaching, carrying, lifting, house/yardwork, driving, computer work.      NMR and Therapeutic Activities:    [] (16787 or 07897) Provided verbal/tactile cueing for activities related to improving balance, coordination, kinesthetic sense, posture, motor skill, proprioception and motor activation to allow for proper function of   [] LE: / Lumbar core, proximal hip and LE with self care and ADLs  [] UE / Cervical: cervical, postural, scapular, scapulothoracic and UE control with self care, carrying, lifting, driving, computer work.   [] (12519) Gait Re-education- Provided training and instruction to the patient for proper LE, core and proximal hip recruitment and positioning and eccentric body weight control with ambulation re-education including up and down stairs     Home Management Training / Self Care:  [] (84699) Provided self-care/home management training related to activities of daily living and compensatory training, and/or use of adaptive equipment for improvement with: ADLs and compensatory training, meal preparation, safety procedures and instruction in use of adaptive equipment, including bathing, grooming, dressing, personal hygiene, basic household cleaning and chores.      Home Exercise Program:    [x] (93640) Reviewed/Progressed HEP activities related to strengthening, flexibility, endurance, ROM of   [] LE / Lumbar: core, proximal hip and LE for functional self-care, mobility, lifting and ambulation/stair navigation   [] UE / Cervical: cervical, postural, scapular, scapulothoracic and UE control with self care, reaching, carrying, lifting, house/yardwork, driving, computer work  [] (49154)Reviewed/Progressed HEP activities related to improving balance, coordination, kinesthetic sense, posture, motor skill, proprioception of   [] LE: core, proximal hip and LE for self care, mobility, lifting, and ambulation/stair navigation    [] UE / Cervical: cervical, postural,  scapular, scapulothoracic and UE control with self care, reaching, carrying, lifting, house/yardwork, driving, computer work    Manual Treatments:  PROM / STM / Oscillations-Mobs:  G-I, II, III, IV (PA's, Inf., Post.)  [] (29073) Provided manual therapy to mobilize LE, proximal hip and/or LS spine soft tissue/joints for the purpose of modulating pain, promoting relaxation,  increasing ROM, reducing/eliminating soft tissue swelling/inflammation/restriction, improving soft tissue extensibility and allowing for proper ROM for normal function with   [] LE / lumbar: self care, mobility, lifting and ambulation. [] UE / Cervical: self care, reaching, carrying, lifting, house/yardwork, driving, computer work. Modalities:  [] (13624) Vasopneumatic compression: Utilized vasopneumatic compression to decrease edema / swelling for the purpose of improving mobility and quad tone / recruitment which will allow for increased overall function including but not limited to self-care, transfers, ambulation, and ascending / descending stairs. Charges:  Timed Code Treatment Minutes: 43   Total Treatment Minutes: 43     [] EVAL - LOW (70982)   [] EVAL - MOD (06713)  [] EVAL - HIGH (57517)  [] RE-EVAL (99537)  [x] GV(83861) x 1       [] Ionto  [x] NMR (21366) x 1      [] Vaso  [] Manual (06685) x       [] Ultrasound  [x] TA x 1       [] Mech Traction (34129)  [] Aquatic Therapy x     [] ES (un) (33339):   [] Home Management Training x  [] ES(attended) (56323)   [] Dry Needling 1-2 muscles (46076):  [] Dry Needling 3+ muscles (723734)  [] Group:      [] Other:     GOALS:  Patient stated goal:  Strengthen legs and arms   []? Progressing: []? Met: []? Not Met: []? Adjusted     Therapist goals for Patient:    Short Term Goals: To be achieved in: 2 weeks  1. Independent in HEP and progression per patient tolerance, in order to prevent re-injury.    []?? Progressing: []?? Met: []?? Not Met: []?? Adjusted  2. Patient will improve TUG by 2 sec to  facilitate improvement in movement, function, and ADLs as indicated by Functional Deficits. []?? Progressing: []?? Met: []?? Not Met: []?? Adjusted     Long Term Goals: To be achieved in: 6 weeks   1. Patient improve sit to stand test to 10 reps to assist with reaching prior level of function and reduce fall risk .   []?? Progressing: []?? Met: []?? Not Met: []?? Adjusted  2. Patient will demonstrate an increase in  LE Strength to at least 4+/5 as well as good proximal hip strength and control to allow for proper functional mobility as indicated by patients Functional Deficits. []?? Progressing: []?? Met: []?? Not Met: []?? Adjusted  3. Patient to report 0 falls in a month period improving confidence with mobility.   []?? Progressing: []?? Met: []?? Not Met: []?? Adjusted  4. Patient to ambulate with 4 wheeled walker on level and unlevel surfaces without loss of balance  []? ? Progressing: []?? Met: []?? Not Met: []?? Adjusted      5. 4. Patient will improve TUG test by 10 secs to reduce community and household fall risk   []? ? Progressing: []?? Met: []?? Not Met: []?? Adjusted            Overall Progression Towards Functional goals/ Treatment Progress Update:  [] Patient is progressing as expected towards functional goals listed. [] Progression is slowed due to complexities/Impairments listed. [] Progression has been slowed due to co-morbidities. [x] Plan just implemented, too soon to assess goals progression <30days   [] Goals require adjustment due to lack of progress  [] Patient is not progressing as expected and requires additional follow up with physician  [] Other    Persisting Functional Limitations/Impairments:  []Sleeping []Sitting               [x]Standing [x]Transfers        [x]Walking []Kneeling               [x]Stairs []Squatting / bending   [x]ADLs [x]Reaching  []Lifting  []Housework  []Driving []Job related tasks  []Sports/Recreation []Other:        ASSESSMENT:  Pt displays shortened and inconsistent stride length and gait pattern with use of quad cane.   She did show reduced shuffling compared to ambulating [facility] no A.D. Had improved gait training at end of session, albeit slow and steady. Continue focus on balance and general strengthening and address vestibular component as necessary. Treatment/Activity Tolerance:  [] Patient able to complete tx [] Patient limited by fatigue  [] Patient limited by pain  [] Patient limited by other medical complications  [] Other:     Prognosis: [] Good [] Fair  [] Poor    Patient Requires Follow-up: [x] Yes  [] No    Plan for next treatment session:  Gait, balance    PLAN: See davey. PT 2-3x / week for 6 weeks. [x] Continue per plan of care [] Alter current plan (see comments)  [] Plan of care initiated [] Hold pending MD visit [] Discharge    Electronically signed by: Nery Marquis PT    Note: If patient does not return for scheduled/ recommended follow up visits, this note will serve as a discharge from care along with most recent update on progress.

## 2021-04-05 ENCOUNTER — HOSPITAL ENCOUNTER (OUTPATIENT)
Dept: PHYSICAL THERAPY | Age: 81
Setting detail: THERAPIES SERIES
Discharge: HOME | End: 2021-04-05
Payer: MEDICARE

## 2021-04-05 PROCEDURE — 97112 NEUROMUSCULAR REEDUCATION: CPT

## 2021-04-05 PROCEDURE — 97110 THERAPEUTIC EXERCISES: CPT

## 2021-04-05 PROCEDURE — 97530 THERAPEUTIC ACTIVITIES: CPT

## 2021-04-05 NOTE — FLOWSHEET NOTE
[facility]  Phone: [phone]   Fax: [phone]    Physical Therapy Daily Treatment Note  Date:  2021    Patient Name:  Elyssa Turner    :  1940  MRN: [id number]  Medical/Treatment Diagnosis Information:  · Diagnosis: Imbalance R26.89  · Treatment Diagnosis: High fall risk, impaired gait mechanics, decreased balance, decreased LE strength, diminished proprioception  Insurance/Certification information:  PT Insurance Information: [employer]  Physician Information:  Referring Practitioner: Bernardo Burk  Plan of care signed (Y/N): [x]  Yes []  No     Date of Patient follow up with Physician:      Progress Report: []  Yes  [x]  No     Date Range for reporting period:  Beginning:  3/29   Ending:     Progress report due (10 Rx/or 30 days whichever is less): visit #21 or      Recertification due (POC duration/ or 90 days whichever is less): visit # 18    Visit # Insurance Allowable Auth required? Date Range   3/12-  [place] []  Yes  []  No      Latex Allergy:  [x]NO      []YES  Preferred Language for Healthcare:   [x]English       []other:    Functional Scale:        Date assessed:  LEFS: raw score = 25/80; dysfunction =    3/29     Pain level:  0/10     SUBJECTIVE:  Feel pretty good overall today, better than last visit. Been trying to do more walking around and moving per discussion at the evaluation. OBJECTIVE: :    Ambulated into clinic with quad cane. Adjusted height of walker for patient with improved stride length and efficiency of movement.         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 hear\"    Exercises/Interventions:     Therapeutic Exercises (56888) Resistance / level Sets/sec Reps Notes   Nustep seat 7, arms 9 41 5 min     IB, HR/TR  30\" X 2  2 x 10            Forward Step-Ups  LSU 4\"  4\"   10 B  10B           LAQ seated 1# 2 10    Hip Marching seated 1# 2 10    Hip Abduction       HS Curls                             Therapeutic Activities (08904)       Educated on gait mechanics with use of quad cane, gait training with quad cane            Endurance laps walking with quad cane 1 lap 150'  Pt fatigued                 Neuromuscular Re-ed (91960)       Narrow RICHIE EO, EC    Feet close EC head nods and turns  30\" X 2 each    10ea Supervision    CGA   Semi-Tandem RICHIE  20\" X 2 B    [facility] TAPS   X 10 each Partial UE support    Trunk twist L/R  360 turns L/R   10  2 slow CGA   SLS              Manual Intervention ([phone])                                                     Modalities:     Pt. Education:  -patient educated on diagnosis, prognosis and expectations for rehab  -all patient questions were answered    Home Exercise Program:    Therapeutic Exercise and NMR EXR  [x] (78999) Provided verbal/tactile cueing for activities related to strengthening, flexibility, endurance, ROM for improvements in  [x] LE / Lumbar: LE, proximal hip, and core control with self care, mobility, lifting, ambulation. [] UE / Cervical: cervical, postural, scapular, scapulothoracic and UE control with self care, reaching, carrying, lifting, house/yardwork, driving, computer work.  [] (48042) Provided verbal/tactile cueing for activities related to improving balance, coordination, kinesthetic sense, posture, motor skill, proprioception to assist with   [] LE / lumbar: LE, proximal hip, and core control in self care, mobility, lifting, ambulation and eccentric single leg control.    [] UE / cervical: cervical, scapular, scapulothoracic and UE control with self care, reaching, carrying, lifting, house/yardwork, driving, computer work.   [] (56861) Therapist is in constant attendance of 2 or more patients providing skilled therapy interventions, but not providing any significant amount of measurable one-on-one time to either patient, for improvements in  [] LE / lumbar: LE, proximal hip, and core control in self care, mobility, lifting, ambulation and eccentric single leg control. [] UE / cervical: cervical, scapular, scapulothoracic and UE control with self care, reaching, carrying, lifting, house/yardwork, driving, computer work. NMR and Therapeutic Activities:    [x] (53715 or 82262) Provided verbal/tactile cueing for activities related to improving balance, coordination, kinesthetic sense, posture, motor skill, proprioception and motor activation to allow for proper function of   [x] LE: / Lumbar core, proximal hip and LE with self care and ADLs  [] UE / Cervical: cervical, postural, scapular, scapulothoracic and UE control with self care, carrying, lifting, driving, computer work.   [] (58851) Gait Re-education- Provided training and instruction to the patient for proper LE, core and proximal hip recruitment and positioning and eccentric body weight control with ambulation re-education including up and down stairs     Home Management Training / Self Care:  [] (75749) Provided self-care/home management training related to activities of daily living and compensatory training, and/or use of adaptive equipment for improvement with: ADLs and compensatory training, meal preparation, safety procedures and instruction in use of adaptive equipment, including bathing, grooming, dressing, personal hygiene, basic household cleaning and chores.      Home Exercise Program:    [x] (79243) Reviewed/Progressed HEP activities related to strengthening, flexibility, endurance, ROM of   [] LE / Lumbar: core, proximal hip and LE for functional self-care, mobility, lifting and ambulation/stair navigation   [] UE / Cervical: cervical, postural, scapular, scapulothoracic and UE control with self care, reaching, carrying, lifting, house/yardwork, driving, computer work  [] (29675)Reviewed/Progressed HEP activities related to improving balance, coordination, kinesthetic sense, posture, motor skill, proprioception of   [] LE: core, proximal hip and LE for self care, mobility, lifting, and ambulation/stair navigation    [] UE / Cervical: cervical, postural,  scapular, scapulothoracic and UE control with self care, reaching, carrying, lifting, house/yardwork, driving, computer work    Manual Treatments:  PROM / STM / Oscillations-Mobs:  G-I, II, III, IV (PA's, Inf., Post.)  [] (56241) Provided manual therapy to mobilize LE, proximal hip and/or LS spine soft tissue/joints for the purpose of modulating pain, promoting relaxation,  increasing ROM, reducing/eliminating soft tissue swelling/inflammation/restriction, improving soft tissue extensibility and allowing for proper ROM for normal function with   [] LE / lumbar: self care, mobility, lifting and ambulation. [] UE / Cervical: self care, reaching, carrying, lifting, house/yardwork, driving, computer work. Modalities:  [] (82284) Vasopneumatic compression: Utilized vasopneumatic compression to decrease edema / swelling for the purpose of improving mobility and quad tone / recruitment which will allow for increased overall function including but not limited to self-care, transfers, ambulation, and ascending / descending stairs. Charges:  Timed Code Treatment Minutes: 43   Total Treatment Minutes: 43     [] EVAL - LOW (22866)   [] EVAL - MOD (87352)  [] EVAL - HIGH (21522)  [] RE-EVAL (91537)  [x] XE(40877) x 1       [] Ionto  [x] NMR (71495) x 1      [] Vaso  [] Manual (67805) x       [] Ultrasound  [x] TA x 1       [] Mech Traction (46107)  [] Aquatic Therapy x     [] ES (un) (77288):   [] Home Management Training x  [] ES(attended) (05488)   [] Dry Needling 1-2 muscles (03816):  [] Dry Needling 3+ muscles (873281)  [] Group:      [] Other:     GOALS:  Patient stated goal:  Strengthen legs and arms   []? Progressing: []? Met: []? Not Met: []? Adjusted     Therapist goals for Patient:    Short Term Goals: To be achieved in: 2 weeks  1.  Independent in HEP and progression per patient tolerance, in order to prevent re-injury. []?? Progressing: []?? Met: []?? Not Met: []?? Adjusted  2. Patient will improve TUG by 2 sec to  facilitate improvement in movement, function, and ADLs as indicated by Functional Deficits. []?? Progressing: []?? Met: []?? Not Met: []?? Adjusted     Long Term Goals: To be achieved in: 6 weeks   1. Patient improve sit to stand test to 10 reps to assist with reaching prior level of function and reduce fall risk .   []?? Progressing: []?? Met: []?? Not Met: []?? Adjusted  2. Patient will demonstrate an increase in  LE Strength to at least 4+/5 as well as good proximal hip strength and control to allow for proper functional mobility as indicated by patients Functional Deficits. []?? Progressing: []?? Met: []?? Not Met: []?? Adjusted  3. Patient to report 0 falls in a month period improving confidence with mobility.   []?? Progressing: []?? Met: []?? Not Met: []?? Adjusted  4. Patient to ambulate with 4 wheeled walker on level and unlevel surfaces without loss of balance  []? ? Progressing: []?? Met: []?? Not Met: []?? Adjusted      5. 4. Patient will improve TUG test by 10 secs to reduce community and household fall risk   []? ? Progressing: []?? Met: []?? Not Met: []?? Adjusted            Overall Progression Towards Functional goals/ Treatment Progress Update:  [] Patient is progressing as expected towards functional goals listed. [] Progression is slowed due to complexities/Impairments listed. [] Progression has been slowed due to co-morbidities.   [x] Plan just implemented, too soon to assess goals progression <30days   [] Goals require adjustment due to lack of progress  [] Patient is not progressing as expected and requires additional follow up with physician  [] Other    Persisting Functional Limitations/Impairments:  []Sleeping []Sitting               [x]Standing [x]Transfers        [x]Walking []Kneeling               [x]Stairs []Squatting / bending   [x]ADLs [x]Reaching  []Lifting  []Housework  []Driving []Job related tasks  []Sports/Recreation []Other:        ASSESSMENT:  Pt displays shortened and inconsistent stride length and gait pattern with use of quad cane. She did show reduced shuffling compared to ambulating wth no A.D. Had improved gait training at end of session, albeit slow and steady. Continue focus on balance and general strengthening and address vestibular component as necessary. Treatment/Activity Tolerance:  [] Patient able to complete tx [] Patient limited by fatigue  [] Patient limited by pain  [] Patient limited by other medical complications  [] Other:     Prognosis: [] Good [] Fair  [] Poor    Patient Requires Follow-up: [x] Yes  [] No    Plan for next treatment session:  Gait, balance    PLAN: See davey. PT 2-3x / week for 6 weeks. [x] Continue per plan of care [] Alter current plan (see comments)  [] Plan of care initiated [] Hold pending MD visit [] Discharge    Electronically signed by: Jane Moran PT, DPT [id number]    Note: If patient does not return for scheduled/ recommended follow up visits, this note will serve as a discharge from care along with most recent update on progress.

## 2021-04-07 ENCOUNTER — HOSPITAL ENCOUNTER (OUTPATIENT)
Dept: PHYSICAL THERAPY | Age: 81
Setting detail: THERAPIES SERIES
Discharge: HOME | End: 2021-04-07
Payer: MEDICARE

## 2021-04-09 ENCOUNTER — HOSPITAL ENCOUNTER (OUTPATIENT)
Dept: PHYSICAL THERAPY | Age: 81
Setting detail: THERAPIES SERIES
Discharge: HOME | End: 2021-04-09
Payer: MEDICARE

## 2021-04-09 PROCEDURE — 97530 THERAPEUTIC ACTIVITIES: CPT

## 2021-04-09 PROCEDURE — 97112 NEUROMUSCULAR REEDUCATION: CPT

## 2021-04-09 PROCEDURE — 97110 THERAPEUTIC EXERCISES: CPT

## 2021-04-09 NOTE — FLOWSHEET NOTE
[address] [facility]  Phone: [phone]   Fax: [phone]    Physical Therapy Daily Treatment Note  Date:  2021    Patient Name:  Angela Barnhart    :  1940  MRN: [id number]  Medical/Treatment Diagnosis Information:  · Diagnosis: Imbalance R26.89  · Treatment Diagnosis: High fall risk, impaired gait mechanics, decreased balance, decreased LE strength, diminished proprioception  Insurance/Certification information:  PT Insurance Information: Aetna Medicare  Physician Information:  Referring Practitioner: Belen Sim  Plan of care signed (Y/N): [x]  Yes []  No     Date of Patient follow up with Physician:      Progress Report: []  Yes  [x]  No     Date Range for reporting period:  Beginning:  3/29   Ending:     Progress report due (10 Rx/or 30 days whichever is less): visit #95 or      Recertification due (POC duration/ or 90 days whichever is less): visit # 18    Visit # Insurance Allowable Auth required? Date Range   -  [place] []  Yes  []  No      Latex Allergy:  [x]NO      []YES  Preferred Language for Healthcare:   [x]English       []other:    Functional Scale:        Date assessed:  LEFS: raw score = 25/80; dysfunction =    3/29     Pain level:  0-3/10 L obed, R great toe 0/10    SUBJECTIVE:  Pt reports they took part of the nail off R great toe, it doen't hurt today but pt cancelled last appt after that was done at Podiatrist.    OBJECTIVE: : pt amb in clinic with quad cane  /2  Ambulated into clinic with quad cane. Adjusted height of walker for patient with improved stride length and efficiency of movement.         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 hear\"    Exercises/Interventions:     Therapeutic Exercises (15387) Resistance / level Sets/sec Reps Notes   Nustep seat 7, arm 9 R (pt reports she fell on L obed last month and doesn't want to use on machine) L1 5 min     IB, HR/TR  30\" X lifting, ambulation and eccentric single leg control. [] UE / cervical: cervical, scapular, scapulothoracic and UE control with self care, reaching, carrying, lifting, house/yardwork, driving, computer work.   [] (59884) Therapist is in constant attendance of 2 or more patients providing skilled therapy interventions, but not providing any significant amount of measurable one-on-one time to either patient, for improvements in  [] LE / lumbar: LE, proximal hip, and core control in self care, mobility, lifting, ambulation and eccentric single leg control. [] UE / cervical: cervical, scapular, scapulothoracic and UE control with self care, reaching, carrying, lifting, house/yardwork, driving, computer work. NMR and Therapeutic Activities:    [x] (74034 or 32075) Provided verbal/tactile cueing for activities related to improving balance, coordination, kinesthetic sense, posture, motor skill, proprioception and motor activation to allow for proper function of   [x] LE: / Lumbar core, proximal hip and LE with self care and ADLs  [] UE / Cervical: cervical, postural, scapular, scapulothoracic and UE control with self care, carrying, lifting, driving, computer work.   [] (13342) Gait Re-education- Provided training and instruction to the patient for proper LE, core and proximal hip recruitment and positioning and eccentric body weight control with ambulation re-education including up and down stairs     Home Management Training / Self Care:  [] (29945) Provided self-care/home management training related to activities of daily living and compensatory training, and/or use of adaptive equipment for improvement with: ADLs and compensatory training, meal preparation, safety procedures and instruction in use of adaptive equipment, including bathing, grooming, dressing, personal hygiene, basic household cleaning and chores.      Home Exercise Program:    [x] (90209) Reviewed/Progressed HEP activities related to strengthening, flexibility, endurance, ROM of   [] LE / Lumbar: core, proximal hip and LE for functional self-care, mobility, lifting and ambulation/stair navigation   [] UE / Cervical: cervical, postural, scapular, scapulothoracic and UE control with self care, reaching, carrying, lifting, house/yardwork, driving, computer work  [] (08859)Reviewed/Progressed HEP activities related to improving balance, coordination, kinesthetic sense, posture, motor skill, proprioception of   [] LE: core, proximal hip and LE for self care, mobility, lifting, and ambulation/stair navigation    [] UE / Cervical: cervical, postural,  scapular, scapulothoracic and UE control with self care, reaching, carrying, lifting, house/yardwork, driving, computer work    Manual Treatments:  PROM / STM / Oscillations-Mobs:  G-I, II, III, IV (PA's, Inf., Post.)  [] (32931) Provided manual therapy to mobilize LE, proximal hip and/or LS spine soft tissue/joints for the purpose of modulating pain, promoting relaxation,  increasing ROM, reducing/eliminating soft tissue swelling/inflammation/restriction, improving soft tissue extensibility and allowing for proper ROM for normal function with   [] LE / lumbar: self care, mobility, lifting and ambulation. [] UE / Cervical: self care, reaching, carrying, lifting, house/yardwork, driving, computer work. Modalities:  [] (14836) Vasopneumatic compression: Utilized vasopneumatic compression to decrease edema / swelling for the purpose of improving mobility and quad tone / recruitment which will allow for increased overall function including but not limited to self-care, transfers, ambulation, and ascending / descending stairs.      Charges:  Timed Code Treatment Minutes: 44   Total Treatment Minutes: 44     [] EVAL - LOW (17572)   [] EVAL - MOD (19921)  [] EVAL - HIGH (89196)  [] RE-EVAL (67630)  [x] KT(76572) x 1       [] Ionto  [x] NMR (18308) x 1      [] Vaso  [] Manual (01721) x       [] complexities/Impairments listed. [] Progression has been slowed due to co-morbidities. [x] Plan just implemented, too soon to assess goals progression <30days   [] Goals require adjustment due to lack of progress  [] Patient is not progressing as expected and requires additional follow up with physician  [] Other    Persisting Functional Limitations/Impairments:  []Sleeping []Sitting               [x]Standing [x]Transfers        [x]Walking []Kneeling               [x]Stairs []Squatting / bending   [x]ADLs [x]Reaching  []Lifting  []Housework  []Driving []Job related tasks  []Sports/Recreation []Other:        ASSESSMENT:  Pt improved with increase of ex today. Main imbalance with 360 turn in ll bars. Treatment/Activity Tolerance:  [x] Patient able to complete tx [] Patient limited by fatigue  [] Patient limited by pain  [] Patient limited by other medical complications  [] Other:     Prognosis: [x] Good [] Fair  [] Poor    Patient Requires Follow-up: [x] Yes  [] No    Plan for next treatment session:  Gait, balance    PLAN: See davey. PT 2-3x / week for 6 weeks. [x] Continue per plan of care [] Alter current plan (see comments)  [] Plan of care initiated [] Hold pending MD visit [] Discharge    Electronically signed by: Ekta Schaffer PT, DPT [id number]    Note: If patient does not return for scheduled/ recommended follow up visits, this note will serve as a discharge from care along with most recent update on progress.

## 2021-04-12 ENCOUNTER — HOSPITAL ENCOUNTER (OUTPATIENT)
Dept: PHYSICAL THERAPY | Age: 81
Setting detail: THERAPIES SERIES
Discharge: HOME | End: 2021-04-12
Payer: MEDICARE

## 2021-04-12 PROCEDURE — 97110 THERAPEUTIC EXERCISES: CPT

## 2021-04-12 PROCEDURE — 97112 NEUROMUSCULAR REEDUCATION: CPT

## 2021-04-12 PROCEDURE — 97530 THERAPEUTIC ACTIVITIES: CPT

## 2021-04-12 RX ORDER — PROPRANOLOL HYDROCHLORIDE 40 MG/1
TABLET ORAL
Qty: 180 TABLET | Refills: 3 | Status: SHIPPED | OUTPATIENT
Start: 2021-04-12 | End: 2022-04-07
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
## 2021-04-14 ENCOUNTER — HOSPITAL ENCOUNTER (OUTPATIENT)
Dept: PHYSICAL THERAPY | Age: 81
Setting detail: THERAPIES SERIES
Discharge: HOME | End: 2021-04-14
Payer: MEDICARE

## 2021-04-14 PROCEDURE — 97530 THERAPEUTIC ACTIVITIES: CPT

## 2021-04-14 PROCEDURE — 97112 NEUROMUSCULAR REEDUCATION: CPT

## 2021-04-14 PROCEDURE — 97110 THERAPEUTIC EXERCISES: CPT

## 2021-04-14 NOTE — FLOWSHEET NOTE
153/74  4/2  Ambulated into clinic with quad cane. Adjusted height of walker for patient with improved stride length and efficiency of movement.         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 hear\"    Exercises/Interventions:     Therapeutic Exercises (96755) Resistance / level Sets/sec Reps Notes   Nustep seat 7, arm 9 R (pt reports she fell on L obed last month and doesn't want to use on machine) L1 5 min     IB, HR/TR  30\" X 2  2 x 10            Forward Step-Ups  LSU        LAQ seated    Hip Marching seated    Gliders hip abd and ext holding end of ll bars    HS Curls                             Therapeutic Activities (41682)       Educated on gait mechanics with use of quad cane, gait training with quad cane     4/12: took pt vitals and talked with pt about weekend episodes       Endurance laps walking with quad cane Pt fatigued   Gait short distance no AD    Pt goal is to be able to go to trailer at [place] at [place], 4 steps rail B JB, then its all level, pt has handle in shower (walk in shower)  Practiced up/down dept 6\" steps with B rails 3x, SBA by PTPt has life alert to wear there                               Neuromuscular Re-ed (71663)       Narrow RICHIE EO, EC    Feet close EC head nods and turns Airex   30\" X 2 each    10ea Supervision    CGA   Semi-Tandem RICHIE, Airex  20\" X 2 B 4/14 Leans to left    Cone TAPS   X 10 each Partial UE support    Trunk twist L/R  360 turns L/R   10  2 slow CGA  (hardest ex for pt)   SLS  3x 10 sec   Light touch on bars   Shuttle Narrow RICHIE with head turns vert/horizontal  X 20 each CGA                                Manual Intervention (34350)                                                     Modalities:     Pt. Education:  -patient educated on diagnosis, prognosis and expectations for rehab  -all patient questions were answered    Home Exercise Program:  4/12: Plan to progress when pt feeling better  Therapeutic Exercise and NMR EXR  [x] (49740) Provided verbal/tactile cueing for activities related to strengthening, flexibility, endurance, ROM for improvements in  [x] LE / Lumbar: LE, proximal hip, and core control with self care, mobility, lifting, ambulation. [] UE / Cervical: cervical, postural, scapular, scapulothoracic and UE control with self care, reaching, carrying, lifting, house/yardwork, driving, computer work.  [] (73657) Provided verbal/tactile cueing for activities related to improving balance, coordination, kinesthetic sense, posture, motor skill, proprioception to assist with   [] LE / lumbar: LE, proximal hip, and core control in self care, mobility, lifting, ambulation and eccentric single leg control. [] UE / cervical: cervical, scapular, scapulothoracic and UE control with self care, reaching, carrying, lifting, house/yardwork, driving, computer work.   [] (61118) Therapist is in constant attendance of 2 or more patients providing skilled therapy interventions, but not providing any significant amount of measurable one-on-one time to either patient, for improvements in  [] LE / lumbar: LE, proximal hip, and core control in self care, mobility, lifting, ambulation and eccentric single leg control. [] UE / cervical: cervical, scapular, scapulothoracic and UE control with self care, reaching, carrying, lifting, house/yardwork, driving, computer work.      NMR and Therapeutic Activities:    [x] (03699 or 73050) Provided verbal/tactile cueing for activities related to improving balance, coordination, kinesthetic sense, posture, motor skill, proprioception and motor activation to allow for proper function of   [x] LE: / Lumbar core, proximal hip and LE with self care and ADLs  [] UE / Cervical: cervical, postural, scapular, scapulothoracic and UE control with self care, carrying, lifting, driving, computer work.   [] (82652) Gait Re-education- Provided training and instruction to the patient for proper LE, lifting, house/yardwork, driving, computer work. Modalities:  [] (54653) Vasopneumatic compression: Utilized vasopneumatic compression to decrease edema / swelling for the purpose of improving mobility and quad tone / recruitment which will allow for increased overall function including but not limited to self-care, transfers, ambulation, and ascending / descending stairs. Charges:  Timed Code Treatment Minutes: 42   Total Treatment Minutes: 42     [] EVAL - LOW (04647)   [] EVAL - MOD (14733)  [] EVAL - HIGH (16505)  [] RE-EVAL (33765)  [x] NP(17188) x 1       [] Ionto  [x] NMR (84882) x 1      [] Vaso  [] Manual (61652) x       [] Ultrasound  [x] TA x 1       [] Mech Traction (28131)  [] Aquatic Therapy x      [] ES (un) (29918):   [] Home Management Training x  [] ES(attended) (81706)   [] Dry Needling 1-2 muscles (09431):  [] Dry Needling 3+ muscles (990334)  [] Group:      [] Other:     GOALS:  Patient stated goal:  Strengthen legs and arms   []? Progressing: []? Met: []? Not Met: []? Adjusted     Therapist goals for Patient:    Short Term Goals: To be achieved in: 2 weeks  1. Independent in HEP and progression per patient tolerance, in order to prevent re-injury. []?? Progressing: []?? Met: []?? Not Met: []?? Adjusted  2. Patient will improve TUG by 2 sec to  facilitate improvement in movement, function, and ADLs as indicated by Functional Deficits. []?? Progressing: []?? Met: []?? Not Met: []?? Adjusted     Long Term Goals: To be achieved in: 6 weeks   1. Patient improve sit to stand test to 10 reps to assist with reaching prior level of function and reduce fall risk .   []?? Progressing: []?? Met: []?? Not Met: []?? Adjusted  2. Patient will demonstrate an increase in  LE Strength to at least 4+/5 as well as good proximal hip strength and control to allow for proper functional mobility as indicated by patients Functional Deficits. []?? Progressing: []?? Met: []?? Not Met: []?? Adjusted  3. Patient to report 0 falls in a month period improving confidence with mobility.   []?? Progressing: []?? Met: []?? Not Met: []?? Adjusted  4. Patient to ambulate with 4 wheeled walker on level and unlevel surfaces without loss of balance  []? ? Progressing: []?? Met: []?? Not Met: []?? Adjusted      5. 4. Patient will improve TUG test by 10 secs to reduce community and household fall risk   []? ? Progressing: []?? Met: []?? Not Met: []?? Adjusted            Overall Progression Towards Functional goals/ Treatment Progress Update:  [] Patient is progressing as expected towards functional goals listed. [] Progression is slowed due to complexities/Impairments listed. [] Progression has been slowed due to co-morbidities. [x] Plan just implemented, too soon to assess goals progression <30days   [] Goals require adjustment due to lack of progress  [] Patient is not progressing as expected and requires additional follow up with physician  [] Other    Persisting Functional Limitations/Impairments:  []Sleeping []Sitting               [x]Standing [x]Transfers        [x]Walking []Kneeling               [x]Stairs []Squatting / bending   [x]ADLs [x]Reaching  []Lifting  []Housework  []Driving []Job related tasks  []Sports/Recreation []Other:        ASSESSMENT:  Pt with episodes the previous weekend of imbalance and high heart rate, but no had no such reports over the last few days. 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 Extensive education on safety with use of cane when she is indoors in her camper at [facility]. Good challenges with turns and head nods. Continue to work on balance, safety awareness so she can safely  Maneuver around her camper at the [address] [place] [address].       Treatment/Activity Tolerance:  [x] Patient able to complete tx [] Patient limited by fatigue  [] Patient limited by pain  [] Patient limited by other medical complications  [] Other:

## 2021-04-16 ENCOUNTER — HOSPITAL ENCOUNTER (OUTPATIENT)
Dept: PHYSICAL THERAPY | Age: 81
Setting detail: THERAPIES SERIES
Discharge: HOME | End: 2021-04-16
Payer: MEDICARE

## 2021-04-16 PROCEDURE — 97116 GAIT TRAINING THERAPY: CPT

## 2021-04-16 PROCEDURE — 97110 THERAPEUTIC EXERCISES: CPT

## 2021-04-16 NOTE — FLOWSHEET NOTE
[address] [facility]  Phone: [phone]   Fax: [phone]    Physical Therapy Daily Treatment Note  Date:  2021    Patient Name:  Elen Villagomez    :  1940  MRN: [id number]  Medical/Treatment Diagnosis Information:  · Diagnosis: Imbalance R26.89  · Treatment Diagnosis: High fall risk, impaired gait mechanics, decreased balance, decreased LE strength, diminished proprioception  Insurance/Certification information:  PT Insurance Information: [employer]  Physician Information:  Referring Practitioner: Jensen Burt  Plan of care signed (Y/N): [x]  Yes []  No     Date of Patient follow up with Physician:      Progress Report: []  Yes  [x]  No     Date Range for reporting period:  Beginning:  3/29   Ending:     Progress report due (10 Rx/or 30 days whichever is less): visit #86 or      Recertification due (POC duration/ or 90 days whichever is less): visit # 18    Visit # Insurance Allowable Auth required? Date Range   -  [place] []  Yes  []  No      Latex Allergy:  [x]NO      []YES  Preferred Language for Healthcare:   [x]English       []other:    Functional Scale:        Date assessed:  LEFS: raw score = 25/80; dysfunction =    3/29     Pain level:  3/10 L obed, R great toe 0/10    SUBJECTIVE:    Not having the best day today, L shoulder is hurting her more. Is excited to be heading back home to [facility] over the weekend, is going to stay with her sister in law, home is single level ranch. Has straight cane at home but feels more wobbly with it and doesn't like to use it. OBJECTIVE: Pt amb in clinic with quad cane  : 97% O2, 48 bpm, BP sitting 153/74    Ambulated into clinic with quad cane. Adjusted height of walker for patient with improved stride length and efficiency of movement.         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 hear\"    Exercises/Interventions: Therapeutic Exercises (14200) Resistance / level Sets/sec Reps Notes   Nustep seat 7, arm 9 R (pt reports she fell on L obed last month and doesn't want to use on machine) L4 5 min     IB, HR/TR  30\" X 2  2 x 10     Mat table (HEP 4/16)  Heel slides  SLR  Bridge  S/L clamshells     10 B  10 B  10  10 B    Forward Step-Ups  LSU        LAQ seated 1 15 B    Hip Marching seated    Gliders hip abd and ext holding end of ll bars    HS Curls                             Therapeutic Activities (78920)       Educated on gait mechanics with use of quad cane, gait training with quad cane     4/12: took pt vitals and talked with pt about weekend episodes       Endurance laps walking with quad cane Pt fatigued   Gait short distance no AD    Pt goal is to be able to go to trailer at [place] at [place], 4 steps rail B JB, then its all level, pt has handle in shower (walk in shower)  Practiced up/down dept 6\" steps with B rails Pt has life alert to wear there   Gait w/SPC   150'  cues to maintain good posture and to improve heel strike                        Neuromuscular Re-ed (16873)       Narrow RICHIE EO, EC    Feet close EC head nods and turns Airex   -Hip flex  30\"     10 B Supervision    CGA   Semi-Tandem RICHIE, Airex  20\" 4/14 Leans to left    Cone TAPS   Partial UE support    Trunk twist L/R  360 turns L/R   CGA  (hardest ex for pt)   SLS    Light touch on bars   Shuttle Narrow RICHIE with head turns vert/horizontal   CGA                                Manual Intervention (63189)                                                     Modalities:     Pt. Education:  -patient educated on diagnosis, prognosis and expectations for rehab  -all patient questions were answered    Home Exercise Program:    Access Code: [email or web address]  URL: [email or web address]  Date: 04/16/2021  Prepared by: Yuriy Lira    Exercises  Supine Heel Slide - 2 x daily - 7 x weekly - 10 reps  Supine Pelvic Tilt with Straight Leg Raise - 2 x daily - 7 x weekly - 10 reps  Bridge - 2 x daily - 7 x weekly - 10 reps  Clamshell - 2 x daily - 7 x weekly - 10 reps  Walking - 10 x daily - 7 x weekly - 3-5 mins hold    4/12: Plan to progress when pt feeling better    Therapeutic Exercise and NMR EXR  [x] (63573) Provided verbal/tactile cueing for activities related to strengthening, flexibility, endurance, ROM for improvements in  [x] LE / Lumbar: LE, proximal hip, and core control with self care, mobility, lifting, ambulation. [] UE / Cervical: cervical, postural, scapular, scapulothoracic and UE control with self care, reaching, carrying, lifting, house/yardwork, driving, computer work.  [] (57174) Provided verbal/tactile cueing for activities related to improving balance, coordination, kinesthetic sense, posture, motor skill, proprioception to assist with   [] LE / lumbar: LE, proximal hip, and core control in self care, mobility, lifting, ambulation and eccentric single leg control. [] UE / cervical: cervical, scapular, scapulothoracic and UE control with self care, reaching, carrying, lifting, house/yardwork, driving, computer work.   [] (37062) Therapist is in constant attendance of 2 or more patients providing skilled therapy interventions, but not providing any significant amount of measurable one-on-one time to either patient, for improvements in  [] LE / lumbar: LE, proximal hip, and core control in self care, mobility, lifting, ambulation and eccentric single leg control. [] UE / cervical: cervical, scapular, scapulothoracic and UE control with self care, reaching, carrying, lifting, house/yardwork, driving, computer work.      NMR and Therapeutic Activities:    [x] (21960 or 83313) Provided verbal/tactile cueing for activities related to improving balance, coordination, kinesthetic sense, posture, motor skill, proprioception and motor activation to allow for proper function of   [x] LE: / Lumbar core, proximal hip and LE with self care and ADLs  [] UE increase in  LE Strength to at least 4+/5 as well as good proximal hip strength and control to allow for proper functional mobility as indicated by patients Functional Deficits. []?? Progressing: []?? Met: []?? Not Met: []?? Adjusted  3. Patient to report 0 falls in a month period improving confidence with mobility.   []?? Progressing: []?? Met: []?? Not Met: []?? Adjusted  4. Patient to ambulate with 4 wheeled walker on level and unlevel surfaces without loss of balance  []? ? Progressing: []?? Met: []?? Not Met: []?? Adjusted      5. 4. Patient will improve TUG test by 10 secs to reduce community and household fall risk   []? ? Progressing: []?? Met: []?? Not Met: []?? Adjusted            Overall Progression Towards Functional goals/ Treatment Progress Update:  [] Patient is progressing as expected towards functional goals listed. [] Progression is slowed due to complexities/Impairments listed. [] Progression has been slowed due to co-morbidities. [x] Plan just implemented, too soon to assess goals progression <30days   [] Goals require adjustment due to lack of progress  [] Patient is not progressing as expected and requires additional follow up with physician  [] Other    Persisting Functional Limitations/Impairments:  []Sleeping []Sitting               [x]Standing [x]Transfers        [x]Walking []Kneeling               [x]Stairs []Squatting / bending   [x]ADLs [x]Reaching  []Lifting  []Housework  []Driving []Job related tasks  []Sports/Recreation []Other:        ASSESSMENT:     Pt able to demo improved gait pattern w/SPC vs QC, however fatigue levels were significant by end of session and pt required CGA for gait. Added to HEP and encouraged pt to increase frequency of walking but to reduce distance. Continue to progress LE strength and balance to improve gait.     Treatment/Activity Tolerance:  [x] Patient able to complete tx [] Patient limited by fatigue  [] Patient limited by pain  [] Patient limited by other medical complications  [] Other:     Prognosis: [x] Good [] Fair  [] Poor    Patient Requires Follow-up: [x] Yes  [] No    Plan for next treatment session:  Gait, balance    PLAN: See eval. PT 2-3x / week for 6 weeks. [x] Continue per plan of care [] Alter current plan (see comments)  [] Plan of care initiated [] Hold pending MD visit [] Discharge    Electronically signed by: Sarah Rodriguez PT    Note: If patient does not return for scheduled/ recommended follow up visits, this note will serve as a discharge from care along with most recent update on progress.

## 2021-04-19 ENCOUNTER — HOSPITAL ENCOUNTER (OUTPATIENT)
Dept: PHYSICAL THERAPY | Age: 81
Setting detail: THERAPIES SERIES
Discharge: HOME | End: 2021-04-19
Payer: MEDICARE

## 2021-04-19 ENCOUNTER — OFFICE VISIT (OUTPATIENT)
Dept: INTERNAL MEDICINE CLINIC | Age: 81
End: 2021-04-19
Payer: MEDICARE

## 2021-04-19 VITALS
SYSTOLIC BLOOD PRESSURE: 132 MMHG | BODY MASS INDEX: 23.41 KG/M2 | HEART RATE: 64 BPM | HEIGHT: 61 IN | DIASTOLIC BLOOD PRESSURE: 64 MMHG | WEIGHT: 124 LBS

## 2021-04-19 DIAGNOSIS — R53.1 WEAKNESS: ICD-10-CM

## 2021-04-19 DIAGNOSIS — F32.1 CURRENT MODERATE EPISODE OF MAJOR DEPRESSIVE DISORDER WITHOUT PRIOR EPISODE (HCC): ICD-10-CM

## 2021-04-19 DIAGNOSIS — R41.3 MEMORY DISTURBANCE: ICD-10-CM

## 2021-04-19 DIAGNOSIS — I10 ESSENTIAL HYPERTENSION: Primary | ICD-10-CM

## 2021-04-19 DIAGNOSIS — E78.2 MIXED HYPERLIPIDEMIA: ICD-10-CM

## 2021-04-19 LAB
BASOPHILS ABSOLUTE: 0.1 K/UL (ref 0–0.2)
BASOPHILS RELATIVE PERCENT: 0.9 %
EOSINOPHILS ABSOLUTE: 0.3 K/UL (ref 0–0.6)
EOSINOPHILS RELATIVE PERCENT: 3.9 %
HCT VFR BLD CALC: 35 % (ref 36–48)
HEMOGLOBIN: 11.9 G/DL (ref 12–16)
LYMPHOCYTES ABSOLUTE: 2 K/UL (ref 1–5.1)
LYMPHOCYTES RELATIVE PERCENT: 22.5 %
MCH RBC QN AUTO: 34.4 PG (ref 26–34)
MCHC RBC AUTO-ENTMCNC: 34.1 G/DL (ref 31–36)
MCV RBC AUTO: 100.9 FL (ref 80–100)
MONOCYTES ABSOLUTE: 0.6 K/UL (ref 0–1.3)
MONOCYTES RELATIVE PERCENT: 7 %
NEUTROPHILS ABSOLUTE: 5.9 K/UL (ref 1.7–7.7)
NEUTROPHILS RELATIVE PERCENT: 65.7 %
PDW BLD-RTO: 15 % (ref 12.4–15.4)
PLATELET # BLD: 237 K/UL (ref 135–450)
PMV BLD AUTO: 9.9 FL (ref 5–10.5)
RBC # BLD: 3.46 M/UL (ref 4–5.2)
VITAMIN B-12: >2000 PG/ML (ref 211–911)
WBC # BLD: 9 K/UL (ref 4–11)

## 2021-04-19 PROCEDURE — 99214 OFFICE O/P EST MOD 30 MIN: CPT | Performed by: INTERNAL MEDICINE

## 2021-04-19 PROCEDURE — 97110 THERAPEUTIC EXERCISES: CPT

## 2021-04-19 PROCEDURE — 97112 NEUROMUSCULAR REEDUCATION: CPT

## 2021-04-19 RX ORDER — SERTRALINE HYDROCHLORIDE 100 MG/1
150 TABLET, FILM COATED ORAL DAILY
Qty: 135 TABLET | Refills: 1 | Status: SHIPPED | OUTPATIENT
Start: 2021-04-19 | End: 2021-09-08 | Stop reason: SDUPTHER

## 2021-04-19 ASSESSMENT — PATIENT HEALTH QUESTIONNAIRE - PHQ9
SUM OF ALL RESPONSES TO PHQ QUESTIONS 1-9: 12
7. TROUBLE CONCENTRATING ON THINGS, SUCH AS READING THE NEWSPAPER OR WATCHING TELEVISION: 0
3. TROUBLE FALLING OR STAYING ASLEEP: 3
SUM OF ALL RESPONSES TO PHQ9 QUESTIONS 1 & 2: 4
SUM OF ALL RESPONSES TO PHQ QUESTIONS 1-9: 14
9. THOUGHTS THAT YOU WOULD BE BETTER OFF DEAD, OR OF HURTING YOURSELF: 2
4. FEELING TIRED OR HAVING LITTLE ENERGY: 3

## 2021-04-19 NOTE — PROGRESS NOTES
Value Date    HDL 37 (L) 02/19/2020    HDL 37 (L) 07/19/2018    HDL 31 (L) 07/11/2017     Lab Results   Component Value Date    LDLCALC 109 (H) 02/19/2020    LDLCALC 99 07/19/2018    LDLCALC 83 07/11/2017     Lab Results   Component Value Date    LABVLDL 38 02/19/2020    LABVLDL 34 07/19/2018    LABVLDL 54 07/11/2017     No results found for: CHOLHDLRATIO     A/P  1. Essential hypertension  Blood pressure is under appropriate control. I asked her to check her blood pressure at home when she is feeling lightheaded so we can confirm that is not going too low at times. I will check blood work today. At this time she will continue her current medications. - CBC Auto Differential  - Renal Function Panel    2. Mixed hyperlipidemia  Chronic and stable. Continue simvastatin. 3. Memory disturbance  This is a new issue addressed today. It has been present for several months. I will check blood work and address any abnormalities as indicated. - TSH WITH REFLEX TO FT4  - VITAMIN B12    4. Current moderate episode of major depressive disorder without prior episode (HCC)  Chronic with worsening symptoms. Increase sertraline to 150 mg daily. 5. Weakness  With recent falls. Neurological exam does not reveal focal findings today. She recently started physical therapy. She will continue PT and she will continue to use assistive devices when ambulating.     Return in 6 weeks

## 2021-04-19 NOTE — FLOWSHEET NOTE
[address] [facility] Physical Therapy  Phone: [phone]   Fax: [phone]    Physical Therapy Daily Treatment Note  Date:  2021    Patient Name:  Madan Munoz    :  1940  MRN: [id number]  Medical/Treatment Diagnosis Information:  · Diagnosis: Imbalance R26.89  · Treatment Diagnosis: High fall risk, impaired gait mechanics, decreased balance, decreased LE strength, diminished proprioception  Insurance/Certification information:  PT Insurance Information: [employer] Medicare  Physician Information:  Referring Practitioner: Kain Rahman  Plan of care signed (Y/N): [x]  Yes []  No     Date of Patient follow up with Physician:      Progress Report: []  Yes  [x]  No     Date Range for reporting period:  Beginning:  3/29   Ending:     Progress report due (10 Rx/or 30 days whichever is less): visit #84 or      Recertification due (POC duration/ or 90 days whichever is less): visit # 18    Visit # Insurance Allowable Auth required? Date Range   -  [place] []  Yes  [x]  No      Latex Allergy:  [x]NO      []YES  Preferred Language for Healthcare:   [x]English       []other:    Functional Scale:        Date assessed:  LEFS: raw score = 25/80; dysfunction =    3/29     Pain level:  3/10 L obed,     SUBJECTIVE:    Feeling OK. Says the left shoulder is still sore and bothering her more. Dr Crystal Hernandez said that he thought the therapy was the best thing for her right now. He agreed that she is not yet ready for the campground. Didn't work on the exercises given last visit because she was out of town but intends on doing them starting this week. OBJECTIVE: Pt amb in clinic with quad cane  : 97% O2, 48 bpm, BP sitting 153/74    Ambulated into clinic with quad cane. Adjusted height of walker for patient with improved stride length and efficiency of movement.         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 hear\"    Exercises/Interventions:     Therapeutic Exercises (36777) Resistance / level Sets/sec Reps Notes   Nustep seat 7, arm 9 R (pt reports she fell on L obed last month and doesn't want to use on machine) L4 5 min     IB, HR/TR  30\" X 2  2 x 10     Mat table (HEP 4/16)  Heel slides  SLR  Bridge  S/L clamshells        2  2     10 B  10  10 B    Forward Step-Ups  LSU 6\" 10 B       LAQ seated 1 15 B    Hip Marching seated    Gliders hip abd and ext holding end of ll bars    HS Curls                             Therapeutic Activities (13186)       Educated on gait mechanics with use of quad cane, gait training with quad cane     4/12: took pt vitals and talked with pt about weekend episodes       Endurance laps walking with quad cane Pt fatigued   Gait short distance no AD    Pt goal is to be able to go to trailer at [place], 4 steps rail B JB, then its all level, pt has handle in shower (walk in shower)  Practiced up/down dept 6\" steps with B rails Pt has life alert to wear there   Gait w/SPC   150'  cues to maintain good posture and to improve heel strike                        Neuromuscular Re-ed (94761)       Narrow RICHIE EO, EC    Feet close EC head nods and turns Airex   -Hip flex  30\"     10 B Supervision    CGA   Semi-Tandem RICHIE, Airex  20\" 4/14 Leans to left    Airex Hip Abduction   10 B   Cone TAPS   Partial UE support    Trunk twist L/R  360 turns L/R   2 slowCGA  (hardest ex for pt)   SLS    Light touch on bars   Shuttle Narrow RICHIE with head turns vert/horizontal   CGA    6\" step taps unsupported   X 10 B    Lateral band walk Lime  X 2 laps // bars                   Manual Intervention (35958)                                                     Modalities:     Pt. Education:  -patient educated on diagnosis, prognosis and expectations for rehab  -all patient questions were answered    Home Exercise Program:    Access Code: [email or web address]  URL: [email or web address]  Date: 04/16/2021  Prepared by: Sonam Corrales    Exercises  Supine Heel Slide - 2 x daily - 7 x weekly - 10 reps  Supine Pelvic Tilt with Straight Leg Raise - 2 x daily - 7 x weekly - 10 reps  Bridge - 2 x daily - 7 x weekly - 10 reps  Clamshell - 2 x daily - 7 x weekly - 10 reps  Walking - 10 x daily - 7 x weekly - 3-5 mins hold    4/12: Plan to progress when pt feeling better    Therapeutic Exercise and NMR EXR  [x] (03504) Provided verbal/tactile cueing for activities related to strengthening, flexibility, endurance, ROM for improvements in  [x] LE / Lumbar: LE, proximal hip, and core control with self care, mobility, lifting, ambulation. [] UE / Cervical: cervical, postural, scapular, scapulothoracic and UE control with self care, reaching, carrying, lifting, house/yardwork, driving, computer work.  [] (25746) Provided verbal/tactile cueing for activities related to improving balance, coordination, kinesthetic sense, posture, motor skill, proprioception to assist with   [] LE / lumbar: LE, proximal hip, and core control in self care, mobility, lifting, ambulation and eccentric single leg control. [] UE / cervical: cervical, scapular, scapulothoracic and UE control with self care, reaching, carrying, lifting, house/yardwork, driving, computer work.   [] (35861) Therapist is in constant attendance of 2 or more patients providing skilled therapy interventions, but not providing any significant amount of measurable one-on-one time to either patient, for improvements in  [] LE / lumbar: LE, proximal hip, and core control in self care, mobility, lifting, ambulation and eccentric single leg control. [] UE / cervical: cervical, scapular, scapulothoracic and UE control with self care, reaching, carrying, lifting, house/yardwork, driving, computer work.      NMR and Therapeutic Activities:    [x] (52643 or 21428) Provided verbal/tactile cueing for activities related to improving balance, coordination, kinesthetic sense, assist with reaching prior level of function and reduce fall risk .   []?? Progressing: []?? Met: []?? Not Met: []?? Adjusted  2. Patient will demonstrate an increase in  LE Strength to at least 4+/5 as well as good proximal hip strength and control to allow for proper functional mobility as indicated by patients Functional Deficits. []?? Progressing: []?? Met: []?? Not Met: []?? Adjusted  3. Patient to report 0 falls in a month period improving confidence with mobility.   []?? Progressing: []?? Met: []?? Not Met: []?? Adjusted  4. Patient to ambulate with 4 wheeled walker on level and unlevel surfaces without loss of balance  []? ? Progressing: []?? Met: []?? Not Met: []?? Adjusted      5. 4. Patient will improve TUG test by 10 secs to reduce community and household fall risk   []? ? Progressing: []?? Met: []?? Not Met: []?? Adjusted            Overall Progression Towards Functional goals/ Treatment Progress Update:  [] Patient is progressing as expected towards functional goals listed. [] Progression is slowed due to complexities/Impairments listed. [] Progression has been slowed due to co-morbidities. [x] Plan just implemented, too soon to assess goals progression <30days   [] Goals require adjustment due to lack of progress  [] Patient is not progressing as expected and requires additional follow up with physician  [] Other    Persisting Functional Limitations/Impairments:  []Sleeping []Sitting               [x]Standing [x]Transfers        [x]Walking []Kneeling               [x]Stairs []Squatting / bending   [x]ADLs [x]Reaching  []Lifting  []Housework  []Driving []Job related tasks  []Sports/Recreation []Other:        ASSESSMENT:     Pt able to demo improved gait pattern w/SPC vs QC, however fatigue levels were significant by end of session. Still struggles with changes in head positioning and turns challenging her vestibular system.    Re-educated on HEP as she has not yet been able to perform since last

## 2021-04-20 LAB
ALBUMIN SERPL-MCNC: 4.1 G/DL (ref 3.4–5)
ANION GAP SERPL CALCULATED.3IONS-SCNC: 12 MMOL/L (ref 3–16)
BUN BLDV-MCNC: 58 MG/DL (ref 7–20)
CALCIUM SERPL-MCNC: 9.6 MG/DL (ref 8.3–10.6)
CHLORIDE BLD-SCNC: 104 MMOL/L (ref 99–110)
CO2: 23 MMOL/L (ref 21–32)
CREAT SERPL-MCNC: 1.3 MG/DL (ref 0.6–1.2)
GFR AFRICAN AMERICAN: 47
GFR NON-AFRICAN AMERICAN: 39
GLUCOSE BLD-MCNC: 92 MG/DL (ref 70–99)
PHOSPHORUS: 4 MG/DL (ref 2.5–4.9)
POTASSIUM SERPL-SCNC: 4.2 MMOL/L (ref 3.5–5.1)
SODIUM BLD-SCNC: 139 MMOL/L (ref 136–145)
TSH REFLEX FT4: 1.7 UIU/ML (ref 0.27–4.2)

## 2021-04-21 ENCOUNTER — HOSPITAL ENCOUNTER (OUTPATIENT)
Dept: PHYSICAL THERAPY | Age: 81
Setting detail: THERAPIES SERIES
Discharge: HOME | End: 2021-04-21
Payer: MEDICARE

## 2021-04-21 PROCEDURE — 97112 NEUROMUSCULAR REEDUCATION: CPT

## 2021-04-21 PROCEDURE — 97530 THERAPEUTIC ACTIVITIES: CPT

## 2021-04-21 PROCEDURE — 97110 THERAPEUTIC EXERCISES: CPT

## 2021-04-21 NOTE — FLOWSHEET NOTE
[address] [facility] Physical Therapy  Phone: [phone]   Fax: [phone]    Physical Therapy Daily Treatment Note/Progress Note  Date:  2021    Patient Name:  Paloma Teixeira    :  1940  MRN: [id number]  Medical/Treatment Diagnosis Information:  · Diagnosis: Imbalance R26.89  · Treatment Diagnosis: High fall risk, impaired gait mechanics, decreased balance, decreased LE strength, diminished proprioception  Insurance/Certification information:  PT Insurance Information: [id number]  Physician Information:  Referring Practitioner: Neelam Lao  Plan of care signed (Y/N): [x]  Yes []  No PN cosign req   Date of Patient follow up with Physician:      Progress Report: [x]  Yes  []  No     Date Range for reporting period:  Beginning:  3/29   Endin/21    Progress report due (10 Rx/or 30 days whichever is less): visit #18 or   (1st PN done visit  #9)    Recertification due (POC duration/ or 90 days whichever is less): visit # 18 or     Visit # Insurance Allowable Auth required? [place]     [place] []  Yes  [x]  No na     Latex Allergy:  [x]NO      []YES  Preferred Language for Healthcare:   [x]English       []other:    Functional Scale:        Date assessed:  LEFS: raw score = 32/80; dysfunction =  70%       Pain level:  3/10 L obed,     SUBJECTIVE:    Pt's daughter reports she felt like she was going to pass out last Sat  but no episodes since. Pt feels achey all over. Pt saw MD on Monday and they did blood work but no results. No falls since eval.         OBJECTIVE: Pt amb in clinic with single point cane  : LE strength L/R hip 4/5, knee 4+/5, ankle 5/5, sit to stand 9x in 1 min needed to use armrests several times, TUG 22sec with cane  : 97% O2, 48 bpm, BP sitting 153/74    Ambulated into clinic with quad cane. Adjusted height of walker for patient with improved stride length and efficiency of movement. 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 hear\"    Exercises/Interventions:     Therapeutic Exercises (29404) Resistance / level Sets/sec Reps Notes   Nustep seat 7, arm 9 R (pt reports she fell on L obed last month and doesn't want to use on machine) L4 5 min     IB, HR/TR  30\" X 2  2 x 10     Mat table (HEP 4/16)  Heel slides  SLR  Bridge  S/L clamshells    Forward Step-Ups  LSU        LAQ seated    Hip Marching seated    Gliders hip abd and ext holding barre 10 ea   HS Curls        Stairs hip flexor stretch and hams stretch L/R  2x 20 sec                   Therapeutic Activities (31478)       Educated on gait mechanics with use of quad cane, gait training with quad cane     4/12: took pt vitals and talked with pt about weekend episodes       Endurance laps walking with quad cane 2.5 lap 450' Pt fatigued   Gait short distance no AD    Pt goal is to be able to go to [employer] at [place] at [place], 4 steps rail B JB, then its all level, pt has handle in shower (walk in shower)  Practiced up/down dept 6\" steps with B rails Pt has life alert to wear there   Gait w/SPC   150' x2in dept and out  50x4 in dept  cues to maintain good posture and to improve heel strike          4/21 TUG and sit to stand done TUG 22 sec with SPC   Sit to  1 min 9x push up from chair sometimes   Up/down stairs in PT dept 6\" with B rails and then again with R rail   1 with B rail  1 with R    Neuromuscular Re-ed (42667)       Narrow RICHIE EO, EC    Feet close EC head nods and turns Airex      10ea     Supervision      CGA   Semi-Tandem RICHIE, Airex  20\" 4/14 Leans to left    Airex Hip Abduction      Cone TAPS   X 10 eachPartial UE support    Trunk twist L/R airex  360 turns L/R   5x22 slowCGA  (hardest ex for pt)   SLS  3x 10 sec   Light touch on bars   Shuttle Narrow RICHIE with head turns vert/horizontal   CGA    6\" step taps unsupported      Lateral band walk Lime Manual Intervention (54532)                                                     Modalities:     Pt. Education:  -patient educated on diagnosis, prognosis and expectations for rehab  -all patient questions were answered    Home Exercise Program:    Access Code: [email or web address]  URL: [email or web address]  Date: 04/16/2021  Prepared by: Jose Louie    Exercises  Supine Heel Slide - 2 x daily - 7 x weekly - 10 reps  Supine Pelvic Tilt with Straight Leg Raise - 2 x daily - 7 x weekly - 10 reps  Bridge - 2 x daily - 7 x weekly - 10 reps  Clamshell - 2 x daily - 7 x weekly - 10 reps  Walking - 10 x daily - 7 x weekly - 3-5 mins hold    4/12: Plan to progress when pt feeling better    Therapeutic Exercise and NMR EXR  [x] (67305) Provided verbal/tactile cueing for activities related to strengthening, flexibility, endurance, ROM for improvements in  [x] LE / Lumbar: LE, proximal hip, and core control with self care, mobility, lifting, ambulation. [] UE / Cervical: cervical, postural, scapular, scapulothoracic and UE control with self care, reaching, carrying, lifting, house/yardwork, driving, computer work.  [] (79083) Provided verbal/tactile cueing for activities related to improving balance, coordination, kinesthetic sense, posture, motor skill, proprioception to assist with   [] LE / lumbar: LE, proximal hip, and core control in self care, mobility, lifting, ambulation and eccentric single leg control. [] UE / cervical: cervical, scapular, scapulothoracic and UE control with self care, reaching, carrying, lifting, house/yardwork, driving, computer work.   [] (99352) Therapist is in constant attendance of 2 or more patients providing skilled therapy interventions, but not providing any significant amount of measurable one-on-one time to either patient, for improvements in  [] LE / lumbar: LE, proximal hip, and core control in self care, mobility, lifting, ambulation and eccentric single leg control.    [] UE / cervical: cervical, scapular, scapulothoracic and UE control with self care, reaching, carrying, lifting, house/yardwork, driving, computer work. NMR and Therapeutic Activities:    [x] (73747 or 31732) Provided verbal/tactile cueing for activities related to improving balance, coordination, kinesthetic sense, posture, motor skill, proprioception and motor activation to allow for proper function of   [x] LE: / Lumbar core, proximal hip and LE with self care and ADLs  [] UE / Cervical: cervical, postural, scapular, scapulothoracic and UE control with self care, carrying, lifting, driving, computer work.   [] (86836) Gait Re-education- Provided training and instruction to the patient for proper LE, core and proximal hip recruitment and positioning and eccentric body weight control with ambulation re-education including up and down stairs     Home Management Training / Self Care:  [] (82955) Provided self-care/home management training related to activities of daily living and compensatory training, and/or use of adaptive equipment for improvement with: ADLs and compensatory training, meal preparation, safety procedures and instruction in use of adaptive equipment, including bathing, grooming, dressing, personal hygiene, basic household cleaning and chores.      Home Exercise Program:    [x] (95612) Reviewed/Progressed HEP activities related to strengthening, flexibility, endurance, ROM of   [x] LE / Lumbar: core, proximal hip and LE for functional self-care, mobility, lifting and ambulation/stair navigation   [] UE / Cervical: cervical, postural, scapular, scapulothoracic and UE control with self care, reaching, carrying, lifting, house/yardwork, driving, computer work  [] (31969)Reviewed/Progressed HEP activities related to improving balance, coordination, kinesthetic sense, posture, motor skill, proprioception of   [] LE: core, proximal hip and LE for self care, mobility, lifting, and ambulation/stair navigation [] UE / Cervical: cervical, postural,  scapular, scapulothoracic and UE control with self care, reaching, carrying, lifting, house/yardwork, driving, computer work    Manual Treatments:  PROM / STM / Oscillations-Mobs:  G-I, II, III, IV (PA's, Inf., Post.)  [] (90295) Provided manual therapy to mobilize LE, proximal hip and/or LS spine soft tissue/joints for the purpose of modulating pain, promoting relaxation,  increasing ROM, reducing/eliminating soft tissue swelling/inflammation/restriction, improving soft tissue extensibility and allowing for proper ROM for normal function with   [] LE / lumbar: self care, mobility, lifting and ambulation. [] UE / Cervical: self care, reaching, carrying, lifting, house/yardwork, driving, computer work. Modalities:  [] (21506) Vasopneumatic compression: Utilized vasopneumatic compression to decrease edema / swelling for the purpose of improving mobility and quad tone / recruitment which will allow for increased overall function including but not limited to self-care, transfers, ambulation, and ascending / descending stairs. Charges:  Timed Code Treatment Minutes: 55   Total Treatment Minutes: 55     [] EVAL - LOW (73519)   [] EVAL - MOD (89404)  [] EVAL - HIGH (07436)  [] RE-EVAL (64160)  [x] MP(76905) x 1       [] Ionto  [x] NMR (17085) x  1    [] Vaso  [] Manual (27430) x       [] Ultrasound  [x] TA x 2       [] Mech Traction (73176)  [] Aquatic Therapy x      [] ES (un) (67012):   [] Home Management Training x  [] ES(attended) (26234)   [] Dry Needling 1-2 muscles (03436):  [] Dry Needling 3+ muscles (352328)  [] Group:      [] Gait Training  x 1     GOALS:  Patient stated goal:  Strengthen legs and arms   [x]? Progressing: []? Met: []? Not Met: []? Adjusted     Therapist goals for Patient:    Short Term Goals: To be achieved in: 2 weeks  1. Independent in HEP and progression per patient tolerance, in order to prevent re-injury. []?? Progressing: [x]? ? Met: []?? Not Met: []?? Adjusted  2. Patient will improve TUG by 2 sec to  facilitate improvement in movement, function, and ADLs as indicated by Functional Deficits. []?? Progressing: [x]? ? Met: []?? Not Met: []?? Adjusted     Long Term Goals: To be achieved in: 6 weeks   1. Patient improve sit to stand test to 10 reps to assist with reaching prior level of function and reduce fall risk .   [x]? ? Progressing: []?? Met: []?? Not Met: []?? Adjusted  2. Patient will demonstrate an increase in  LE Strength to at least 4+/5 as well as good proximal hip strength and control to allow for proper functional mobility as indicated by patients Functional Deficits. [x]? ? Progressing: []?? Met: []?? Not Met: []?? Adjusted  3. Patient to report 0 falls in a month period improving confidence with mobility.   []?? Progressing: [x]? ? Met: []?? Not Met: []?? Adjusted  4. Patient to ambulate with 4 wheeled walker on level and unlevel surfaces without loss of balance  []? ? Progressing: [x]? ? Met: []?? Not Met: []?? Adjusted      5. 4. Patient will improve TUG test by 10 secs to reduce community and household fall risk   [x]? ? Progressing: []?? Met: []?? Not Met: []?? Adjusted            Overall Progression Towards Functional goals/ Treatment Progress Update:  [] Patient is progressing as expected towards functional goals listed. [x] Progression is slowed due to complexities/Impairments listed. [] Progression has been slowed due to co-morbidities.   [] Plan just implemented, too soon to assess goals progression <30days   [] Goals require adjustment due to lack of progress  [] Patient is not progressing as expected and requires additional follow up with physician  [] Other    Persisting Functional Limitations/Impairments:  []Sleeping []Sitting               [x]Standing [x]Transfers        [x]Walking []Kneeling               [x]Stairs []Squatting / bending   [x]ADLs [x]Reaching  []Lifting  []Housework  []Driving []Job related tasks  []Sports/Recreation []Other:        ASSESSMENT:     Pt able to demo improved gait pattern w/SPC vs QC, however fatigue levels were significant by end of session. Still struggles with changes in head positioning and turns challenging her vestibular system. Re-educated on HEP as she has not yet been able to perform since last visit,  and encouraged pt to increase frequency of walking but to reduce distance. Continue to progress LE strength and balance to improve gait and help reduce risk for falls     Treatment/Activity Tolerance:  [x] Patient able to complete tx [] Patient limited by fatigue  [] Patient limited by pain  [] Patient limited by other medical complications  [] Other:     Prognosis: [x] Good [] Fair  [] Poor    Patient Requires Follow-up: [x] Yes  [] No    Plan for next treatment session:  Gait, balance    PLAN: See davey. PT 2-3x / week for 6 weeks. [x] Continue per plan of care [] Alter current plan (see comments)  [] Plan of care initiated [] Hold pending MD visit [] Discharge    Electronically signed by: Tiffany Denney PT    Note: If patient does not return for scheduled/ recommended follow up visits, this note will serve as a discharge from care along with most recent update on progress.

## 2021-04-23 ENCOUNTER — HOSPITAL ENCOUNTER (OUTPATIENT)
Dept: PHYSICAL THERAPY | Age: 81
Setting detail: THERAPIES SERIES
Discharge: HOME | End: 2021-04-23
Payer: MEDICARE

## 2021-04-23 NOTE — FLOWSHEET NOTE
Physical Therapy  Cancellation/No-show Note  Patient Name:  Antonio Tate  :  1940   Date:  2021  Cancelled visits to date: 1  No-shows to date: 0    Patient status for today's appointment patient:  [x]  Cancelled   []  Rescheduled appointment  []  No-show     Reason given by patient:  [x]  Patient ill   []  Conflicting appointment  []  No transportation    []  Conflict with work  []  No reason given  []  Other:     Comments:      Phone call information:   []  Phone call made today to patient at _ time at number provided:      []  Patient answered, conversation as follows:    []  Patient did not answer, message left as follows:  [x]  Phone call not made today    Electronically signed by:  Sylvia Gupta PT

## 2021-04-26 ENCOUNTER — HOSPITAL ENCOUNTER (OUTPATIENT)
Dept: PHYSICAL THERAPY | Age: 81
Setting detail: THERAPIES SERIES
Discharge: HOME | End: 2021-04-26
Payer: MEDICARE

## 2021-04-26 PROCEDURE — 97530 THERAPEUTIC ACTIVITIES: CPT

## 2021-04-26 PROCEDURE — 97112 NEUROMUSCULAR REEDUCATION: CPT

## 2021-04-26 PROCEDURE — 97110 THERAPEUTIC EXERCISES: CPT

## 2021-04-26 NOTE — FLOWSHEET NOTE
[address] [facility] Physical Therapy  Phone: [phone]   Fax: [phone]    Physical Therapy Daily Treatment Note/Progress Note  Date:  2021    Patient Name:  Monie Hollins    :  1940  MRN: [id number]  Medical/Treatment Diagnosis Information:  · Diagnosis: Imbalance R26.89  · Treatment Diagnosis: High fall risk, impaired gait mechanics, decreased balance, decreased LE strength, diminished proprioception  Insurance/Certification information:  PT Insurance Information: [employer]  Physician Information:  Referring Practitioner: Xi Bean  Plan of care signed (Y/N): [x]  Yes []  No PN cosign  and PN cosigned  Date of Patient follow up with Physician:      Progress Report: []  Yes  [x]  No     Date Range for reporting period:  Beginning:  3/29   Endin/21    Progress report due (10 Rx/or 30 days whichever is less): visit #18 or   (1st PN done visit  #9)    Recertification due (POC duration/ or 90 days whichever is less): visit # 18 or     Visit # Insurance Allowable Auth required? Date Range   10/18  [place] []  Yes  [x]  No na     Latex Allergy:  [x]NO      []YES  Preferred Language for Healthcare:   [x]English       []other:    Functional Scale:        Date assessed:  LEFS: raw score = 32/80; dysfunction =  70%       Pain level:  3/10 L obed,     SUBJECTIVE:    Pt reports doing pretty well today, no spells in last week, the last one was . (No falls since eval.         OBJECTIVE: : pt to PT no AD, per pt she forgot her cane. Amb with supervision/SBA with daughter  Pt amb in clinic with single point cane  : LE strength L/R hip 4/5, knee 4+/5, ankle 5/5, sit to stand 9x in 1 min needed to use armrests several times, TUG 22sec with cane  : 97% O2, 48 bpm, BP sitting 153/74    Ambulated into clinic with quad cane. Adjusted height of walker for patient with improved stride length and efficiency of movement. Manual Intervention (43786)                                                     Modalities:     Pt. Education:  -patient educated on diagnosis, prognosis and expectations for rehab  -all patient questions were answered    Home Exercise Program:    Access Code: [email or web address]  URL: [email or web address]  Date: 04/16/2021  Prepared by: Natalee Cabrera    Exercises  Supine Heel Slide - 2 x daily - 7 x weekly - 10 reps  Supine Pelvic Tilt with Straight Leg Raise - 2 x daily - 7 x weekly - 10 reps  Bridge - 2 x daily - 7 x weekly - 10 reps  Clamshell - 2 x daily - 7 x weekly - 10 reps  Walking - 10 x daily - 7 x weekly - 3-5 mins hold    4/12: Plan to progress when pt feeling better    Therapeutic Exercise and NMR EXR  [x] (43473) Provided verbal/tactile cueing for activities related to strengthening, flexibility, endurance, ROM for improvements in  [x] LE / Lumbar: LE, proximal hip, and core control with self care, mobility, lifting, ambulation. [] UE / Cervical: cervical, postural, scapular, scapulothoracic and UE control with self care, reaching, carrying, lifting, house/yardwork, driving, computer work.  [] (76901) Provided verbal/tactile cueing for activities related to improving balance, coordination, kinesthetic sense, posture, motor skill, proprioception to assist with   [] LE / lumbar: LE, proximal hip, and core control in self care, mobility, lifting, ambulation and eccentric single leg control. [] UE / cervical: cervical, scapular, scapulothoracic and UE control with self care, reaching, carrying, lifting, house/yardwork, driving, computer work.   [] (74111) Therapist is in constant attendance of 2 or more patients providing skilled therapy interventions, but not providing any significant amount of measurable one-on-one time to either patient, for improvements in  [] LE / lumbar: LE, proximal hip, and core control in self care, mobility, lifting, ambulation and eccentric single leg control. [] UE / cervical: cervical, scapular, scapulothoracic and UE control with self care, reaching, carrying, lifting, house/yardwork, driving, computer work. NMR and Therapeutic Activities:    [x] (76506 or 97820) Provided verbal/tactile cueing for activities related to improving balance, coordination, kinesthetic sense, posture, motor skill, proprioception and motor activation to allow for proper function of   [x] LE: / Lumbar core, proximal hip and LE with self care and ADLs  [] UE / Cervical: cervical, postural, scapular, scapulothoracic and UE control with self care, carrying, lifting, driving, computer work.   [] (31704) Gait Re-education- Provided training and instruction to the patient for proper LE, core and proximal hip recruitment and positioning and eccentric body weight control with ambulation re-education including up and down stairs     Home Management Training / Self Care:  [] (59787) Provided self-care/home management training related to activities of daily living and compensatory training, and/or use of adaptive equipment for improvement with: ADLs and compensatory training, meal preparation, safety procedures and instruction in use of adaptive equipment, including bathing, grooming, dressing, personal hygiene, basic household cleaning and chores.      Home Exercise Program:    [x] (06487) Reviewed/Progressed HEP activities related to strengthening, flexibility, endurance, ROM of   [x] LE / Lumbar: core, proximal hip and LE for functional self-care, mobility, lifting and ambulation/stair navigation   [] UE / Cervical: cervical, postural, scapular, scapulothoracic and UE control with self care, reaching, carrying, lifting, house/yardwork, driving, computer work  [] (51135)Reviewed/Progressed HEP activities related to improving balance, coordination, kinesthetic sense, posture, motor skill, proprioception of   [] LE: core, proximal hip and LE for self care, mobility, lifting, and ambulation/stair navigation    [] UE / Cervical: cervical, postural,  scapular, scapulothoracic and UE control with self care, reaching, carrying, lifting, house/yardwork, driving, computer work    Manual Treatments:  PROM / STM / Oscillations-Mobs:  G-I, II, III, IV (PA's, Inf., Post.)  [] (00441) Provided manual therapy to mobilize LE, proximal hip and/or LS spine soft tissue/joints for the purpose of modulating pain, promoting relaxation,  increasing ROM, reducing/eliminating soft tissue swelling/inflammation/restriction, improving soft tissue extensibility and allowing for proper ROM for normal function with   [] LE / lumbar: self care, mobility, lifting and ambulation. [] UE / Cervical: self care, reaching, carrying, lifting, house/yardwork, driving, computer work. Modalities:  [] (84238) Vasopneumatic compression: Utilized vasopneumatic compression to decrease edema / swelling for the purpose of improving mobility and quad tone / recruitment which will allow for increased overall function including but not limited to self-care, transfers, ambulation, and ascending / descending stairs. Charges:  Timed Code Treatment Minutes: 45   Total Treatment Minutes: 45     [] EVAL - LOW (17506)   [] EVAL - MOD (68529)  [] EVAL - HIGH (80171)  [] RE-EVAL (73889)  [x] CO(55732) x 1       [] Ionto  [x] NMR (12576) x  1    [] Vaso  [] Manual (69539) x       [] Ultrasound  [x] TA x 1      [] Mech Traction (56033)  [] Aquatic Therapy x      [] ES (un) (84870):   [] Home Management Training x  [] ES(attended) (17848)   [] Dry Needling 1-2 muscles (30336):  [] Dry Needling 3+ muscles (358165)  [] Group:      [] Gait Training  x 1     GOALS:  Patient stated goal:  Strengthen legs and arms   [x]? Progressing: []? Met: []? Not Met: []? Adjusted     Therapist goals for Patient:    Short Term Goals: To be achieved in: 2 weeks  1. Independent in HEP and progression per patient tolerance, in order to prevent re-injury.    []?? Progressing: [x]?? Met: []?? Not Met: []?? Adjusted  2. Patient will improve TUG by 2 sec to  facilitate improvement in movement, function, and ADLs as indicated by Functional Deficits. []?? Progressing: [x]? ? Met: []?? Not Met: []?? Adjusted     Long Term Goals: To be achieved in: 6 weeks   1. Patient improve sit to stand test to 10 reps to assist with reaching prior level of function and reduce fall risk .   [x]? ? Progressing: []?? Met: []?? Not Met: []?? Adjusted  2. Patient will demonstrate an increase in  LE Strength to at least 4+/5 as well as good proximal hip strength and control to allow for proper functional mobility as indicated by patients Functional Deficits. [x]? ? Progressing: []?? Met: []?? Not Met: []?? Adjusted  3. Patient to report 0 falls in a month period improving confidence with mobility.   []?? Progressing: [x]? ? Met: []?? Not Met: []?? Adjusted  4. Patient to ambulate with 4 wheeled walker on level and unlevel surfaces without loss of balance  []? ? Progressing: [x]? ? Met: []?? Not Met: []?? Adjusted      5. 4. Patient will improve TUG test by 10 secs to reduce community and household fall risk   [x]? ? Progressing: []?? Met: []?? Not Met: []?? Adjusted            Overall Progression Towards Functional goals/ Treatment Progress Update:  [] Patient is progressing as expected towards functional goals listed. [x] Progression is slowed due to complexities/Impairments listed. [] Progression has been slowed due to co-morbidities.   [] Plan just implemented, too soon to assess goals progression <30days   [] Goals require adjustment due to lack of progress  [] Patient is not progressing as expected and requires additional follow up with physician  [] Other    Persisting Functional Limitations/Impairments:  []Sleeping []Sitting               [x]Standing [x]Transfers        [x]Walking []Kneeling               [x]Stairs []Squatting / bending   [x]ADLs [x]Reaching  []Lifting  []Housework  []Driving []Job related tasks  []Sports/Recreation []Other:        ASSESSMENT:     Pt able to demo improved gait pattern w/SPC vs QC, however fatigue levels were significant by end of session. Still struggles with changes in head positioning and turns challenging her vestibular system. Re-educated on HEP as she has not yet been able to perform since last visit,  and encouraged pt to increase frequency of walking but to reduce distance. Continue to progress LE strength and balance to improve gait and help reduce risk for falls     Treatment/Activity Tolerance:  [x] Patient able to complete tx [] Patient limited by fatigue  [] Patient limited by pain  [] Patient limited by other medical complications  [] Other:     Prognosis: [x] Good [] Fair  [] Poor    Patient Requires Follow-up: [x] Yes  [] No    Plan for next treatment session:  Gait, balance    PLAN: See davey. PT 2-3x / week for 6 weeks. [x] Continue per plan of care [] Alter current plan (see comments)  [] Plan of care initiated [] Hold pending MD visit [] Discharge    Electronically signed by: Yariel Dean PT    Note: If patient does not return for scheduled/ recommended follow up visits, this note will serve as a discharge from care along with most recent update on progress.

## 2021-04-28 ENCOUNTER — HOSPITAL ENCOUNTER (OUTPATIENT)
Dept: PHYSICAL THERAPY | Age: 81
Setting detail: THERAPIES SERIES
Discharge: HOME | End: 2021-04-28
Payer: MEDICARE

## 2021-04-28 PROCEDURE — 97110 THERAPEUTIC EXERCISES: CPT

## 2021-04-28 PROCEDURE — 97530 THERAPEUTIC ACTIVITIES: CPT

## 2021-04-28 PROCEDURE — 97112 NEUROMUSCULAR REEDUCATION: CPT

## 2021-04-28 NOTE — FLOWSHEET NOTE
[address] [facility] Physical Therapy  Phone: [phone]   Fax: [phone]    Physical Therapy Daily Treatment Note  Date:  2021    Patient Name:  Elen Villagomez    :  1940  MRN: [id number]  Medical/Treatment Diagnosis Information:  · Diagnosis: Imbalance R26.89  · Treatment Diagnosis: High fall risk, impaired gait mechanics, decreased balance, decreased LE strength, diminished proprioception  Insurance/Certification information:  PT Insurance Information: [address] [place] [address]  Medicare  Physician Information:  Referring Practitioner: Jensen Burt  Plan of care signed (Y/N): [x]  Yes []  No PN cosign  and PN cosigned  Date of Patient follow up with Physician:      Progress Report: []  Yes  [x]  No     Date Range for reporting period:  Beginning:  3/29   Endin/21    Progress report due (10 Rx/or 30 days whichever is less): visit #18 or   (1st PN done visit  #9)    Recertification due (POC duration/ or 90 days whichever is less): visit # 18 or     Visit # Insurance Allowable Auth required? Date Range     [place] []  Yes  [x]  No na     Latex Allergy:  [x]NO      []YES  Preferred Language for Healthcare:   [x]English       []other:    Functional Scale:        Date assessed:  LEFS: raw score = 32/80; dysfunction =  70%       Pain level:  3/10 L obed,     SUBJECTIVE:    Pt reports doing pretty well today, no spells in last week, the last one was . (No falls since eval.) Per pt daughter she did well going to [facility] after PT last visit. OBJECTIVE:   : to PT with [employer]  : pt to PT no AD, per pt she forgot her cane. Amb with supervision/SBA with daughter  Pt amb in clinic with single point cane  : LE strength L/R hip 4/5, knee 4+/5, ankle 5/5, sit to stand 9x in 1 min needed to use armrests several times, TUG 22sec with cane  : 97% O2, 48 bpm, BP sitting 153/74    Ambulated into clinic with quad cane.   Adjusted height of walker for patient with improved stride length and efficiency of movement.         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 hear\"    Exercises/Interventions:     Therapeutic Exercises (85502) Resistance / level Sets/sec Reps Notes   Nustep seat 7, arm 9 R (pt reports she fell on L obed last month and doesn't want to use on machine) L4 5 min     IB, HR/TR  30\" X 2  2 x 10     Mat table (HEP 4/16)  Heel slides  SLR  Bridge  S/L clamshells    Forward Step-Ups  LSU        LAQ seated    Hip Marching seated    Gliders hip abd and ext holding barre    Mini sq  marches 10 ea    10  10          Stairs hip flexor stretch and hams stretch L/R  2x 20 sec                   Therapeutic Activities (55576)       Educated on gait mechanics with use of quad cane, gait training with quad cane     4/12: took pt vitals and talked with pt about weekend episodes       Endurance laps walking   1.5 [id number]'Pt fatigued  SBA/CGA   Gait in ll bars with grey mats down 4 lapsPt touching bars   Pt goal is to be able to go to trailer at [place], 4 steps rail B JB, then its all level, pt has handle in shower (walk in shower)  Practiced up/down dept 6\" steps with B rails Pt has life alert to wear there   Gait no AD    Gait with SPC    50x4 in dept    In/out dept 150'x2  cues to maintain good posture and to improve heel strike   Cone  4 cones   1 x    4/21 TUG and sit to stand done TUG 22 sec with SPC   Sit to  1 min 9x push up from chair sometimes   Up/down stairs in PT dept 6\" wit R rail     1     Neuromuscular Re-ed (48312)       Narrow RICHIE EO, EC    Feet close EC head nods and turns Airex      10ea     Supervision      CGA   Semi-Tandem RICHIE, Airex  20\" 4/14 Leans to left    Airex Hip Abduction      Cone TAPS   X 10 eachPartial UE support    Trunk twist L/R airex  360 turns L/R   5x22 slowCGA  (hardest ex for pt)   SLS  3x 10 sec   Light touch on bars   Shuttle Narrow RICHIE with head turns vert/horizontal   CGA    6\" step taps unsupported      Lateral band walk Lime  X 4 laps ballet bar                  Manual Intervention ([phone])                                                     Modalities:     Pt. Education:  -patient educated on diagnosis, prognosis and expectations for rehab  -all patient questions were answered    Home Exercise Program:    Access Code: [email or web address]  URL: [email or web address]  Date: 04/16/2021  Prepared by: Av Bell    Exercises  Supine Heel Slide - 2 x daily - 7 x weekly - 10 reps  Supine Pelvic Tilt with Straight Leg Raise - 2 x daily - 7 x weekly - 10 reps  Bridge - 2 x daily - 7 x weekly - 10 reps  Clamshell - 2 x daily - 7 x weekly - 10 reps  Walking - 10 x daily - 7 x weekly - 3-5 mins hold    4/12: Plan to progress when pt feeling better    Therapeutic Exercise and NMR EXR  [x] (98638) Provided verbal/tactile cueing for activities related to strengthening, flexibility, endurance, ROM for improvements in  [x] LE / Lumbar: LE, proximal hip, and core control with self care, mobility, lifting, ambulation. [] UE / Cervical: cervical, postural, scapular, scapulothoracic and UE control with self care, reaching, carrying, lifting, house/yardwork, driving, computer work.  [] (77053) Provided verbal/tactile cueing for activities related to improving balance, coordination, kinesthetic sense, posture, motor skill, proprioception to assist with   [] LE / lumbar: LE, proximal hip, and core control in self care, mobility, lifting, ambulation and eccentric single leg control.    [] UE / cervical: cervical, scapular, scapulothoracic and UE control with self care, reaching, carrying, lifting, house/yardwork, driving, computer work.   [] (05339) Therapist is in constant attendance of 2 or more patients providing skilled therapy interventions, but not providing any significant amount of measurable one-on-one time to either patient, for improvements in  [] LE / lumbar: LE, proximal hip, and core control in self care, mobility, lifting, ambulation and eccentric single leg control. [] UE / cervical: cervical, scapular, scapulothoracic and UE control with self care, reaching, carrying, lifting, house/yardwork, driving, computer work. NMR and Therapeutic Activities:    [x] (50593 or 88745) Provided verbal/tactile cueing for activities related to improving balance, coordination, kinesthetic sense, posture, motor skill, proprioception and motor activation to allow for proper function of   [x] LE: / Lumbar core, proximal hip and LE with self care and ADLs  [] UE / Cervical: cervical, postural, scapular, scapulothoracic and UE control with self care, carrying, lifting, driving, computer work.   [] (58831) Gait Re-education- Provided training and instruction to the patient for proper LE, core and proximal hip recruitment and positioning and eccentric body weight control with ambulation re-education including up and down stairs     Home Management Training / Self Care:  [] (42430) Provided self-care/home management training related to activities of daily living and compensatory training, and/or use of adaptive equipment for improvement with: ADLs and compensatory training, meal preparation, safety procedures and instruction in use of adaptive equipment, including bathing, grooming, dressing, personal hygiene, basic household cleaning and chores.      Home Exercise Program:    [x] (79147) Reviewed/Progressed HEP activities related to strengthening, flexibility, endurance, ROM of   [x] LE / Lumbar: core, proximal hip and LE for functional self-care, mobility, lifting and ambulation/stair navigation   [] UE / Cervical: cervical, postural, scapular, scapulothoracic and UE control with self care, reaching, carrying, lifting, house/yardwork, driving, computer work  [] (93730)Reviewed/Progressed HEP activities related to improving balance, coordination, kinesthetic sense, posture, motor skill, proprioception of   [] LE: core, proximal hip and LE for self care, mobility, lifting, and ambulation/stair navigation    [] UE / Cervical: cervical, postural,  scapular, scapulothoracic and UE control with self care, reaching, carrying, lifting, house/yardwork, driving, computer work    Manual Treatments:  PROM / STM / Oscillations-Mobs:  G-I, II, III, IV (PA's, Inf., Post.)  [] (78187) Provided manual therapy to mobilize LE, proximal hip and/or LS spine soft tissue/joints for the purpose of modulating pain, promoting relaxation,  increasing ROM, reducing/eliminating soft tissue swelling/inflammation/restriction, improving soft tissue extensibility and allowing for proper ROM for normal function with   [] LE / lumbar: self care, mobility, lifting and ambulation. [] UE / Cervical: self care, reaching, carrying, lifting, house/yardwork, driving, computer work. Modalities:  [] (17882) Vasopneumatic compression: Utilized vasopneumatic compression to decrease edema / swelling for the purpose of improving mobility and quad tone / recruitment which will allow for increased overall function including but not limited to self-care, transfers, ambulation, and ascending / descending stairs. Charges:  Timed Code Treatment Minutes: 45   Total Treatment Minutes: 45     [] EVAL - LOW (91324)   [] EVAL - MOD (11177)  [] EVAL - HIGH (72041)  [] RE-EVAL (22664)  [x] ZG(65371) x 1       [] Ionto  [x] NMR (38477) x  1    [] Vaso  [] Manual (50490) x       [] Ultrasound  [x] TA x 1      [] Mech Traction (14273)  [] Aquatic Therapy x      [] ES (un) (56353):   [] Home Management Training x  [] ES(attended) (43121)   [] Dry Needling 1-2 muscles (03682):  [] Dry Needling 3+ muscles (794278)  [] Group:      [] Gait Training  x 1     GOALS:  Patient stated goal:  Strengthen legs and arms   [x]? Progressing: []? Met: []? Not Met: []? Adjusted     Therapist goals for Patient:    Short Term Goals: To be achieved in: 2 weeks  1. Independent in HEP and progression per patient tolerance, in order to prevent re-injury. []?? Progressing: [x]? ? Met: []?? Not Met: []?? Adjusted  2. Patient will improve TUG by 2 sec to  facilitate improvement in movement, function, and ADLs as indicated by Functional Deficits. []?? Progressing: [x]? ? Met: []?? Not Met: []?? Adjusted     Long Term Goals: To be achieved in: 6 weeks   1. Patient improve sit to stand test to 10 reps to assist with reaching prior level of function and reduce fall risk .   [x]? ? Progressing: []?? Met: []?? Not Met: []?? Adjusted  2. Patient will demonstrate an increase in  LE Strength to at least 4+/5 as well as good proximal hip strength and control to allow for proper functional mobility as indicated by patients Functional Deficits. [x]? ? Progressing: []?? Met: []?? Not Met: []?? Adjusted  3. Patient to report 0 falls in a month period improving confidence with mobility.   []?? Progressing: [x]? ? Met: []?? Not Met: []?? Adjusted  4. Patient to ambulate with 4 wheeled walker on level and unlevel surfaces without loss of balance  []? ? Progressing: [x]? ? Met: []?? Not Met: []?? Adjusted      5. 4. Patient will improve TUG test by 10 secs to reduce community and household fall risk   [x]? ? Progressing: []?? Met: []?? Not Met: []?? Adjusted            Overall Progression Towards Functional goals/ Treatment Progress Update:  [] Patient is progressing as expected towards functional goals listed. [x] Progression is slowed due to complexities/Impairments listed. [] Progression has been slowed due to co-morbidities.   [] Plan just implemented, too soon to assess goals progression <30days   [] Goals require adjustment due to lack of progress  [] Patient is not progressing as expected and requires additional follow up with physician  [] Other    Persisting Functional Limitations/Impairments:  []Sleeping []Sitting               [x]Standing [x]Transfers        [x]Walking []Kneeling               [x]Stairs []Squatting / bending   [x]ADLs [x]Reaching  []Lifting  []Housework  []Driving []Job related tasks  []Sports/Recreation []Other:        ASSESSMENT:     Pt improving balance and endurance. Main issue today with 360 turns in ll bars. Continue to work on balance and endurance, pt has a goal to go to her place near [place] [id number] in the future. Treatment/Activity Tolerance:  [x] Patient able to complete tx [] Patient limited by fatigue  [] Patient limited by pain  [] Patient limited by other medical complications  [] Other:     Prognosis: [x] Good [] Fair  [] Poor    Patient Requires Follow-up: [x] Yes  [] No    Plan for next treatment session:  Gait, balance    PLAN: See davey. PT 2-3x / week for 6 weeks. [x] Continue per plan of care [] Alter current plan (see comments)  [] Plan of care initiated [] Hold pending MD visit [] Discharge    Electronically signed by: Naveen Murillo PT, DPT [id number]    Note: If patient does not return for scheduled/ recommended follow up visits, this note will serve as a discharge from care along with most recent update on progress.

## 2021-04-30 ENCOUNTER — HOSPITAL ENCOUNTER (OUTPATIENT)
Dept: PHYSICAL THERAPY | Age: 81
Setting detail: THERAPIES SERIES
Discharge: HOME | End: 2021-04-30
Payer: MEDICARE

## 2021-04-30 PROCEDURE — 97112 NEUROMUSCULAR REEDUCATION: CPT

## 2021-04-30 PROCEDURE — 97530 THERAPEUTIC ACTIVITIES: CPT

## 2021-04-30 PROCEDURE — 97110 THERAPEUTIC EXERCISES: CPT

## 2021-04-30 NOTE — FLOWSHEET NOTE
[address] [facility] Physical Therapy  Phone: [phone]   Fax: [phone]    Physical Therapy Daily Treatment Note  Date:  2021    Patient Name:  Elyssa Turner    :  1940  MRN: [id number]  Medical/Treatment Diagnosis Information:  · Diagnosis: Imbalance R26.89  · Treatment Diagnosis: High fall risk, impaired gait mechanics, decreased balance, decreased LE strength, diminished proprioception  Insurance/Certification information:  PT Insurance Information: [address] [place] [address]  Medicare  Physician Information:  Referring Practitioner: Bernardo Burk  Plan of care signed (Y/N): [x]  Yes []  No PN cosign  and PN cosigned  Date of Patient follow up with Physician:      Progress Report: []  Yes  [x]  No     Date Range for reporting period:  Beginning:  3/29   Endin/21    Progress report due (10 Rx/or 30 days whichever is less): visit #18 or   (1st PN done visit  #9)    Recertification due (POC duration/ or 90 days whichever is less): visit # 18 or     Visit # Insurance Allowable Auth required? Date Range     [place] []  [place]  [x]  No na     Latex Allergy:  [x]NO      []YES  Preferred Language for Healthcare:   [x]English       []other:    Functional Scale:        Date assessed:  LEFS: raw score = 32/80; dysfunction =  70%       Pain level:  3/10 L obed,     SUBJECTIVE:   Said that she is doing better, daughter reports they are doing more walking, balancing, and exercises at home. Not using the walker as much. Does a bicycle at home, and lots of other activities and is feeling a lot better. OBJECTIVE:   : to PT with [facility]  : pt to PT no AD, per pt she forgot her cane.  Amb with supervision/SBA with daughter  Pt amb in clinic with single point cane  : LE strength L/R hip 4/5, knee 4+/5, ankle 5/5, sit to stand 9x in 1 min needed to use armrests several times, TUG 22sec with cane  : 97% O2, 48 bpm, BP sitting 153/74    Ambulated into clinic with quad cane. Adjusted height of walker for patient with improved stride length and efficiency of movement.         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 hear\"    Exercises/Interventions:     Therapeutic Exercises (31092) Resistance / level Sets/sec Reps Notes   Nustep seat 7, arm 9 R (pt reports she fell on L obed last month and doesn't want to use on machine) L4 5 min     IB, HR/TR  30\" X 2  2 x 10     Mat table (HEP 4/16)  Heel slides  SLR  Bridge  S/L clamshells    Forward Step-Ups  LSU        LAQ seated    Hip Marching seated    Gliders hip abd and ext holding barre    Mini sq  marches 210 ea    10  10          Stairs hip flexor stretch and hams stretch L/R  2x 20 sec                   Therapeutic Activities (68632)       Educated on gait mechanics with use of quad cane, gait training with quad cane     4/12: took pt vitals and talked with pt about weekend episodes       Endurance laps walking   1.5 [id number]'Pt fatigued  SBA/CGA   Gait in ll bars with grey mats down 4 lapsPt touching bars   Pt goal is to be able to go to trailer at [place] at [place], 4 steps rail B JB, then its all level, pt has handle in shower (walk in shower)  Practiced up/down dept 6\" steps with B rails Pt has life alert to wear there   Gait no AD    Gait with SPC    50 x 4 in dept      cues to maintain good posture and to improve heel strike   Cone  4 cones   1 x    4/21 TUG and sit to stand done TUG 22 sec with SPC   Sit to  1 min 9x push up from chair sometimes   Gait training-Alt Marching hand/knee, head nods/turns, 180* turns, varied speeds  5 mins   Gait belt          Cable Column walkouts, 4 way 1 plate  X 3 each CGA          Up/down stairs in PT dept 6\" wit R rail     1     Neuromuscular Re-ed (38303)       Narrow RICHIE EO, EC    Feet close EC head nods and turns Airex      10ea     Supervision      CGA   Semi-Tandem RICHIE, Airex  20\" 4/14 Leans to left    Airex Hip postural, scapular, scapulothoracic and UE control with self care, reaching, carrying, lifting, house/yardwork, driving, computer work  [] (22298)Reviewed/Progressed HEP activities related to improving balance, coordination, kinesthetic sense, posture, motor skill, proprioception of   [] LE: core, proximal hip and LE for self care, mobility, lifting, and ambulation/stair navigation    [] UE / Cervical: cervical, postural,  scapular, scapulothoracic and UE control with self care, reaching, carrying, lifting, house/yardwork, driving, computer work    Manual Treatments:  PROM / STM / Oscillations-Mobs:  G-I, II, III, IV (PA's, Inf., Post.)  [] (73163) Provided manual therapy to mobilize LE, proximal hip and/or LS spine soft tissue/joints for the purpose of modulating pain, promoting relaxation,  increasing ROM, reducing/eliminating soft tissue swelling/inflammation/restriction, improving soft tissue extensibility and allowing for proper ROM for normal function with   [] LE / lumbar: self care, mobility, lifting and ambulation. [] UE / Cervical: self care, reaching, carrying, lifting, house/yardwork, driving, computer work. Modalities:  [] (65641) Vasopneumatic compression: Utilized vasopneumatic compression to decrease edema / swelling for the purpose of improving mobility and quad tone / recruitment which will allow for increased overall function including but not limited to self-care, transfers, ambulation, and ascending / descending stairs.      Charges:  Timed Code Treatment Minutes: 42   Total Treatment Minutes: 42     [] EVAL - LOW (30439)   [] EVAL - MOD (48470)  [] EVAL - HIGH (73970)  [] RE-EVAL (82649)  [x] JL(74098) x 1       [] Ionto  [x] NMR (44099) x  1    [] Vaso  [] Manual (66762) x       [] Ultrasound  [x] TA x 1      [] Mech Traction (70369)  [] Aquatic Therapy x      [] ES (un) (00422):   [] Home Management Training x  [] ES(attended) (14010)   [] Dry Needling 1-2 muscles (27388):  [] Dry Needling 3+ muscles (978166)  [] Group:      [] Gait Training  x 1     GOALS:  Patient stated goal:  Strengthen legs and arms   [x]? Progressing: []? Met: []? Not Met: []? Adjusted     Therapist goals for Patient:    Short Term Goals: To be achieved in: 2 weeks  1. Independent in HEP and progression per patient tolerance, in order to prevent re-injury. []?? Progressing: [x]? ? Met: []?? Not Met: []?? Adjusted  2. Patient will improve TUG by 2 sec to  facilitate improvement in movement, function, and ADLs as indicated by Functional Deficits. []?? Progressing: [x]? ? Met: []?? Not Met: []?? Adjusted     Long Term Goals: To be achieved in: 6 weeks   1. Patient improve sit to stand test to 10 reps to assist with reaching prior level of function and reduce fall risk .   [x]? ? Progressing: []?? Met: []?? Not Met: []?? Adjusted  2. Patient will demonstrate an increase in  LE Strength to at least 4+/5 as well as good proximal hip strength and control to allow for proper functional mobility as indicated by patients Functional Deficits. [x]? ? Progressing: []?? Met: []?? Not Met: []?? Adjusted  3. Patient to report 0 falls in a month period improving confidence with mobility.   []?? Progressing: [x]? ? Met: []?? Not Met: []?? Adjusted  4. Patient to ambulate with 4 wheeled walker on level and unlevel surfaces without loss of balance  []? ? Progressing: [x]? ? Met: []?? Not Met: []?? Adjusted      5. 4. Patient will improve TUG test by 10 secs to reduce community and household fall risk   [x]? ? Progressing: []?? Met: []?? Not Met: []?? Adjusted            Overall Progression Towards Functional goals/ Treatment Progress Update:  [] Patient is progressing as expected towards functional goals listed. [x] Progression is slowed due to complexities/Impairments listed. [] Progression has been slowed due to co-morbidities.   [] Plan just implemented, too soon to assess goals progression <30days   [] Goals require adjustment due to lack of progress  [] Patient is not progressing as expected and requires additional follow up with physician  [] Other    Persisting Functional Limitations/Impairments:  []Sleeping []Sitting               [x]Standing [x]Transfers        [x]Walking []Kneeling               [x]Stairs []Squatting / bending   [x]ADLs [x]Reaching  []Lifting  []Housework  []Driving []Job related tasks  []Sports/Recreation []Other:        ASSESSMENT:     Pt improving balance and endurance. Showing improved efficiency with stride length and foot clearance. Still struggles with turns and change in direction with regards to gait, but is showing better stability and reduced fall risk without the need for an A.D. Continue to work on balance and endurance, pt has a goal to go to her place near [place] [address] in the future. Treatment/Activity Tolerance:  [x] Patient able to complete tx [] Patient limited by fatigue  [] Patient limited by pain  [] Patient limited by other medical complications  [] Other:     Prognosis: [x] Good [] Fair  [] Poor    Patient Requires Follow-up: [x] Yes  [] No    Plan for next treatment session:  Gait, balance    PLAN: See davey. PT 2-3x / week for 6 weeks. [x] Continue per plan of care [] Alter current plan (see comments)  [] Plan of care initiated [] Hold pending MD visit [] Discharge    Electronically signed by: Trista Vu PT    Note: If patient does not return for scheduled/ recommended follow up visits, this note will serve as a discharge from care along with most recent update on progress.

## 2021-05-03 ENCOUNTER — HOSPITAL ENCOUNTER (OUTPATIENT)
Dept: PHYSICAL THERAPY | Age: 81
Setting detail: THERAPIES SERIES
Discharge: HOME | End: 2021-05-03
Payer: MEDICARE

## 2021-05-03 PROCEDURE — 97110 THERAPEUTIC EXERCISES: CPT

## 2021-05-03 PROCEDURE — 97112 NEUROMUSCULAR REEDUCATION: CPT

## 2021-05-03 NOTE — FLOWSHEET NOTE
[address] [facility] Physical Therapy  Phone: [phone]   Fax: [phone]    Physical Therapy Daily Treatment Note  Date:  5/3/2021    Patient Name:  Yanet Britton    :  1940  MRN: [id number]  Medical/Treatment Diagnosis Information:  · Diagnosis: Imbalance R26.89  · Treatment Diagnosis: High fall risk, impaired gait mechanics, decreased balance, decreased LE strength, diminished proprioception  Insurance/Certification information:  PT Insurance Information: [address] [place] [address]  Medicare  Physician Information:  Referring Practitioner: Elyssa Castanon  Plan of care signed (Y/N): [x]  Yes []  No PN cosign  and PN cosigned  Date of Patient follow up with Physician:      Progress Report: []  Yes  [x]  No     Date Range for reporting period:  Beginning:  3/29   Endin/21    Progress report due (10 Rx/or 30 days whichever is less): visit #18 or   (1st PN done visit  #9)    Recertification due (POC duration/ or 90 days whichever is less): visit # 18 or     Visit # Insurance Allowable Auth required? Date Range     [place] []  [place]  [x]  No na     Latex Allergy:  [x]NO      []YES  Preferred Language for Healthcare:   [x]English       []other:    Functional Scale:        Date assessed:  LEFS: raw score = 32/80; dysfunction =  70%       Pain level:  3/10 L obed,     SUBJECTIVE:   Pt states she went to her camper over the weekend and did good without any issues. OBJECTIVE:   : to PT with [facility]  : pt to PT no AD, per pt she forgot her cane. Amb with supervision/SBA with daughter  Pt amb in clinic with single point cane  : LE strength L/R hip 4/5, knee 4+/5, ankle 5/5, sit to stand 9x in 1 min needed to use armrests several times, TUG 22sec with cane  : 97% O2, 48 bpm, BP sitting 153/74    Ambulated into clinic with quad cane. Adjusted height of walker for patient with improved stride length and efficiency of movement.         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 hear\"    Exercises/Interventions:     Therapeutic Exercises (48381) Resistance / level Sets/sec Reps Notes   Nustep seat 7, arm 9 R (pt reports she fell on L obed last month and doesn't want to use on machine) L4 5 min     IB, HR/TR  30\" X 2  2 x 10     Mat table (HEP 4/16)  Heel slides  SLR  Bridge  S/L clamshells    Forward Step-Ups  LSU        LAQ seated    Hip Marching seated    Gliders hip abd and ext holding barre    Mini sq  marches 210 ea    10  10          Stairs hip flexor stretch and hams stretch L/R  2x 20 sec                   Therapeutic Activities (99485)       Educated on gait mechanics with use of quad cane, gait training with quad cane     4/12: took pt vitals and talked with pt about weekend episodes       Endurance laps walking   Pt fatigued  SBA/CGA   Gait in ll bars with grey mats down Pt touching bars   Pt goal is to be able to go to [place] [facility] [place] at [place], 4 steps rail B JB, then its all level, pt has handle in shower (walk in shower)  Practiced up/down dept 6\" steps with B rails Pt has life alert to wear there   Gait no AD    Gait with SPC          cues to maintain good posture and to improve heel strike   Cone  4 cones   1 x    4/21 TUG and sit to stand done    Sit to  1 min 9x push up from chair sometimes   Gait training-Alt Marching hand/knee, head nods/turns, 180* turns, varied speeds    Gait belt          Cable Column walkouts, 4 way 1 plate  X 3 each CGA          Up/down stairs in PT dept 6\" wit R rail         Neuromuscular Re-ed (04706)       Narrow RICHIE EO, EC    Feet close EC head nods and turns Airex      10ea     Supervision      CGA   Semi-Tandem RICHIE, Airex  20\" 4/14 Leans to left    Airex Hip Abduction      Cone TAPS   X 10 eachPartial UE support    Trunk twist L/R airex  360 turns L/R on Airex   5x22 slowCGA  (hardest ex for pt)   SLS  3x 10 sec   Light touch on bars   Shuttle Narrow RICHIE with head turns vert/horizontal   CGA    6\" step taps unsupported      Lateral band walk    Step-Up with alt marching 6\"  X 10 B Support with L UE only           Manual Intervention (98154)                                                     Modalities:     Pt. Education:  -patient educated on diagnosis, prognosis and expectations for rehab  -all patient questions were answered    Home Exercise Program:    Access Code: [email or web address]  URL: [email or web address]  Date: 04/16/2021  Prepared by: Natalee Cabrera    Exercises  Supine Heel Slide - 2 x daily - 7 x weekly - 10 reps  Supine Pelvic Tilt with Straight Leg Raise - 2 x daily - 7 x weekly - 10 reps  Bridge - 2 x daily - 7 x weekly - 10 reps  Clamshell - 2 x daily - 7 x weekly - 10 reps  Walking - 10 x daily - 7 x weekly - 3-5 mins hold    4/12: Plan to progress when pt feeling better    Therapeutic Exercise and NMR EXR  [x] (25758) Provided verbal/tactile cueing for activities related to strengthening, flexibility, endurance, ROM for improvements in  [x] LE / Lumbar: LE, proximal hip, and core control with self care, mobility, lifting, ambulation. [] UE / Cervical: cervical, postural, scapular, scapulothoracic and UE control with self care, reaching, carrying, lifting, house/yardwork, driving, computer work.  [] (43877) Provided verbal/tactile cueing for activities related to improving balance, coordination, kinesthetic sense, posture, motor skill, proprioception to assist with   [] LE / lumbar: LE, proximal hip, and core control in self care, mobility, lifting, ambulation and eccentric single leg control.    [] UE / cervical: cervical, scapular, scapulothoracic and UE control with self care, reaching, carrying, lifting, house/yardwork, driving, computer work.   [] (83413) Therapist is in constant attendance of 2 or more patients providing skilled therapy interventions, but not providing any significant amount of measurable one-on-one time to either patient, for improvements in  [] LE / lumbar: LE, proximal hip, and core control in self care, mobility, lifting, ambulation and eccentric single leg control. [] UE / cervical: cervical, scapular, scapulothoracic and UE control with self care, reaching, carrying, lifting, house/yardwork, driving, computer work. NMR and Therapeutic Activities:    [x] (78585 or 34630) Provided verbal/tactile cueing for activities related to improving balance, coordination, kinesthetic sense, posture, motor skill, proprioception and motor activation to allow for proper function of   [x] LE: / Lumbar core, proximal hip and LE with self care and ADLs  [] UE / Cervical: cervical, postural, scapular, scapulothoracic and UE control with self care, carrying, lifting, driving, computer work.   [] (86897) Gait Re-education- Provided training and instruction to the patient for proper LE, core and proximal hip recruitment and positioning and eccentric body weight control with ambulation re-education including up and down stairs     Home Management Training / Self Care:  [] (91677) Provided self-care/home management training related to activities of daily living and compensatory training, and/or use of adaptive equipment for improvement with: ADLs and compensatory training, meal preparation, safety procedures and instruction in use of adaptive equipment, including bathing, grooming, dressing, personal hygiene, basic household cleaning and chores.      Home Exercise Program:    [x] (01936) Reviewed/Progressed HEP activities related to strengthening, flexibility, endurance, ROM of   [x] LE / Lumbar: core, proximal hip and LE for functional self-care, mobility, lifting and ambulation/stair navigation   [] UE / Cervical: cervical, postural, scapular, scapulothoracic and UE control with self care, reaching, carrying, lifting, house/yardwork, driving, computer work  [] (01121)Reviewed/Progressed HEP activities related to improving balance, coordination,

## 2021-05-05 ENCOUNTER — HOSPITAL ENCOUNTER (OUTPATIENT)
Dept: PHYSICAL THERAPY | Age: 81
Setting detail: THERAPIES SERIES
Discharge: HOME | End: 2021-05-05
Payer: MEDICARE

## 2021-05-05 PROCEDURE — 97112 NEUROMUSCULAR REEDUCATION: CPT

## 2021-05-05 PROCEDURE — 97110 THERAPEUTIC EXERCISES: CPT

## 2021-05-05 NOTE — FLOWSHEET NOTE
[address] [facility] Physical Therapy  Phone: [phone]   Fax: [phone]    Physical Therapy Daily Treatment Note  Date:  2021    Patient Name:  Kwadwo Lee    :  1940  MRN: [id number]  Medical/Treatment Diagnosis Information:  · Diagnosis: Imbalance R26.89  · Treatment Diagnosis: High fall risk, impaired gait mechanics, decreased balance, decreased LE strength, diminished proprioception  Insurance/Certification information:  PT Insurance Information: [employer]  Physician Information:  Referring Practitioner: Cristino Myles  Plan of care signed (Y/N): [x]  Yes []  No PN cosign  and PN cosigned  Date of Patient follow up with Physician:      Progress Report: []  Yes  [x]  No     Date Range for reporting period:  Beginning:  3/29   Endin/21    Progress report due (10 Rx/or 30 days whichever is less): visit #18 or   (1st PN done visit  #9)    Recertification due (POC duration/ or 90 days whichever is less): visit # 18 or     Visit # Insurance Allowable Auth required? Date Range     [place] []  Yes  [x]  No na     Latex Allergy:  [x]NO      []YES  Preferred Language for Healthcare:   [x]English       []other:    Functional Scale:        Date assessed:  LEFS: raw score = 32/80; dysfunction =  70%       Pain level:  2/10  L shoulder    SUBJECTIVE:   Pt states she is doing okay just feels a little tired today. OBJECTIVE:   : to PT with [address]  : pt to PT no AD, per pt she forgot her cane. Amb with supervision/SBA with daughter  Pt amb in clinic with single point cane  : LE strength L/R hip 4/5, knee 4+/5, ankle 5/5, sit to stand 9x in 1 min needed to use armrests several times, TUG 22sec with cane  : 97% O2, 48 bpm, BP sitting 153/74    Ambulated into clinic with quad cane. Adjusted height of walker for patient with improved stride length and efficiency of movement.         RESTRICTIONS/PRECAUTIONS: per pt 10-15 years RICHIE with head turns vert/horizontal   CGA    6\" step taps unsupported      Lateral band walk    Step-Up with alt marching 6\"  X 10 B Support with L UE only    Hurdles in // bars   4laps 2 laps w/gait belt   Manual Intervention (46247)                                                     Modalities:     Pt. Education:  -patient educated on diagnosis, prognosis and expectations for rehab  -all patient questions were answered    Home Exercise Program:    Access Code: [email or web address]  URL: [email or web address]  Date: 04/16/2021  Prepared by: Buddy Swann    Exercises  Supine Heel Slide - 2 x daily - 7 x weekly - 10 reps  Supine Pelvic Tilt with Straight Leg Raise - 2 x daily - 7 x weekly - 10 reps  Bridge - 2 x daily - 7 x weekly - 10 reps  Clamshell - 2 x daily - 7 x weekly - 10 reps  Walking - 10 x daily - 7 x weekly - 3-5 mins hold    4/12: Plan to progress when pt feeling better    Therapeutic Exercise and NMR EXR  [x] (57867) Provided verbal/tactile cueing for activities related to strengthening, flexibility, endurance, ROM for improvements in  [x] LE / Lumbar: LE, proximal hip, and core control with self care, mobility, lifting, ambulation. [] UE / Cervical: cervical, postural, scapular, scapulothoracic and UE control with self care, reaching, carrying, lifting, house/yardwork, driving, computer work.  [] (64781) Provided verbal/tactile cueing for activities related to improving balance, coordination, kinesthetic sense, posture, motor skill, proprioception to assist with   [] LE / lumbar: LE, proximal hip, and core control in self care, mobility, lifting, ambulation and eccentric single leg control.    [] UE / cervical: cervical, scapular, scapulothoracic and UE control with self care, reaching, carrying, lifting, house/yardwork, driving, computer work.   [] (45920) Therapist is in constant attendance of 2 or more patients providing skilled therapy interventions, but not providing any significant amount of measurable one-on-one time to either patient, for improvements in  [] LE / lumbar: LE, proximal hip, and core control in self care, mobility, lifting, ambulation and eccentric single leg control. [] UE / cervical: cervical, scapular, scapulothoracic and UE control with self care, reaching, carrying, lifting, house/yardwork, driving, computer work. NMR and Therapeutic Activities:    [x] (72044 or 26365) Provided verbal/tactile cueing for activities related to improving balance, coordination, kinesthetic sense, posture, motor skill, proprioception and motor activation to allow for proper function of   [x] LE: / Lumbar core, proximal hip and LE with self care and ADLs  [] UE / Cervical: cervical, postural, scapular, scapulothoracic and UE control with self care, carrying, lifting, driving, computer work.   [] (37232) Gait Re-education- Provided training and instruction to the patient for proper LE, core and proximal hip recruitment and positioning and eccentric body weight control with ambulation re-education including up and down stairs     Home Management Training / Self Care:  [] (85313) Provided self-care/home management training related to activities of daily living and compensatory training, and/or use of adaptive equipment for improvement with: ADLs and compensatory training, meal preparation, safety procedures and instruction in use of adaptive equipment, including bathing, grooming, dressing, personal hygiene, basic household cleaning and chores.      Home Exercise Program:    [x] (23123) Reviewed/Progressed HEP activities related to strengthening, flexibility, endurance, ROM of   [x] LE / Lumbar: core, proximal hip and LE for functional self-care, mobility, lifting and ambulation/stair navigation   [] UE / Cervical: cervical, postural, scapular, scapulothoracic and UE control with self care, reaching, carrying, lifting, house/yardwork, driving, computer work  [] (02233)Reviewed/Progressed HEP activities related to improving balance, coordination, kinesthetic sense, posture, motor skill, proprioception of   [] LE: core, proximal hip and LE for self care, mobility, lifting, and ambulation/stair navigation    [] UE / Cervical: cervical, postural,  scapular, scapulothoracic and UE control with self care, reaching, carrying, lifting, house/yardwork, driving, computer work    Manual Treatments:  PROM / STM / Oscillations-Mobs:  G-I, II, III, IV (PA's, Inf., Post.)  [] (61162) Provided manual therapy to mobilize LE, proximal hip and/or LS spine soft tissue/joints for the purpose of modulating pain, promoting relaxation,  increasing ROM, reducing/eliminating soft tissue swelling/inflammation/restriction, improving soft tissue extensibility and allowing for proper ROM for normal function with   [] LE / lumbar: self care, mobility, lifting and ambulation. [] UE / Cervical: self care, reaching, carrying, lifting, house/yardwork, driving, computer work. Modalities:  [] (84644) Vasopneumatic compression: Utilized vasopneumatic compression to decrease edema / swelling for the purpose of improving mobility and quad tone / recruitment which will allow for increased overall function including but not limited to self-care, transfers, ambulation, and ascending / descending stairs. Charges:  Timed Code Treatment Minutes: 43   Total Treatment Minutes: 43     [] EVAL - LOW (45862)   [] EVAL - MOD (78389)  [] EVAL - HIGH (22731)  [] RE-EVAL (61974)  [x] FV(47447) x 1       [] Ionto  [x] NMR (20007) x  2    [] Vaso  [] Manual (45435) x       [] Ultrasound  [] TA x      [] Mech Traction (49688)  [] Aquatic Therapy x      [] ES (un) (39136):   [] Home Management Training x  [] ES(attended) (61272)   [] Dry Needling 1-2 muscles (82115):  [] Dry Needling 3+ muscles (619339)  [] Group:      [] Gait Training  x 1     GOALS:  Patient stated goal:  Strengthen legs and arms   [x]? Progressing: []? Met: []? Not Met: []?  Adjusted

## 2021-05-07 ENCOUNTER — HOSPITAL ENCOUNTER (OUTPATIENT)
Dept: PHYSICAL THERAPY | Age: 81
Setting detail: THERAPIES SERIES
End: 2021-05-07
Payer: MEDICARE

## 2021-05-10 ENCOUNTER — HOSPITAL ENCOUNTER (OUTPATIENT)
Dept: PHYSICAL THERAPY | Age: 81
Setting detail: THERAPIES SERIES
Discharge: HOME | End: 2021-05-10
Payer: MEDICARE

## 2021-05-10 PROCEDURE — 97110 THERAPEUTIC EXERCISES: CPT

## 2021-05-10 PROCEDURE — 97530 THERAPEUTIC ACTIVITIES: CPT

## 2021-05-10 PROCEDURE — 97112 NEUROMUSCULAR REEDUCATION: CPT

## 2021-05-10 NOTE — FLOWSHEET NOTE
movement.         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 hear\"    Exercises/Interventions:     Therapeutic Exercises (26873) Resistance / level Sets/sec Reps Notes   Nustep seat 7, arm 9 R (pt reports she fell on L obed last month and doesn't want to use on machine) L4 5 min     IB,   HR/TR  30\" X 2  2 x 10     Mat table (HEP 4/16)  Heel slides  SLR  Bridge  S/L clamshells    Forward Step-Ups  LSU        LAQ seated    Hip Marching seated    Gliders hip abd and ext holding barre    Mini sq   marches 15 ea    15  15            Stairs hip flexor stretch and hams stretch L/R  2x 20 sec                   Therapeutic Activities (99625)       Educated on gait mechanics with use of quad cane, gait training with quad cane     4/12: took pt vitals and talked with pt about weekend episodes       Endurance laps walking   2.75 lap     425'   Pt fatigued  SBA/CGA   Gait in ll bars with grey mats down Pt touching bars   Pt goal is to be able to go to trailer at [place] at [place], 4 steps rail B JB, then its all level, pt has handle in shower (walk in shower)  Practiced up/down dept 6\" steps with B rails Pt has life alert to wear there   Gait no AD    Gait with SPC          cues to maintain good posture and to improve heel strike   Cone  4 cones   1 x    4/21 TUG and sit to stand done    Sit to  1 min 9x push up from chair sometimes   Gait training-Alt Marching hand/knee, head nods/turns, 180* turns, varied speeds    Gait belt          Cable Column walkouts, 4 way 1 plate  X 3 each CGA          Up/down stairs in PT dept 6\" wit R rail     1     Neuromuscular Re-ed (16277)       Narrow RICHIE EO, EC    Feet close EC head nods and turns Airex      10ea     Supervision      CGA   Semi-Tandem RICHIE, Airex  20\" X 2 B4/14 Leans to left    Airex Hip Abduction   10 B   Cone TAPS   X 10 eachPartial UE support    Trunk twist L/R airex  360 turns L/R on Airex   10x2 slowCGA  (hardest ex for pt)   SLS  3x 10 sec   Light touch on bars   Shuttle Narrow RICHIE with head turns vert/horizontal   CGA    6\" step taps unsupported      Lateral band walk    Step-Up with alt marching Support with L UE only    Hurdles in // bars   4laps SBA/CGA   Manual Intervention (90833)                                                     Modalities:     Pt. Education:  -patient educated on diagnosis, prognosis and expectations for rehab  -all patient questions were answered    Home Exercise Program:    Access Code: [email or web address]  URL: [email or web address]  Date: 04/16/2021  Prepared by: Jaquan Meehan    Exercises  Supine Heel Slide - 2 x daily - 7 x weekly - 10 reps  Supine Pelvic Tilt with Straight Leg Raise - 2 x daily - 7 x weekly - 10 reps  Bridge - 2 x daily - 7 x weekly - 10 reps  Clamshell - 2 x daily - 7 x weekly - 10 reps  Walking - 10 x daily - 7 x weekly - 3-5 mins hold    4/12: Plan to progress when pt feeling better    Therapeutic Exercise and NMR EXR  [x] (05838) Provided verbal/tactile cueing for activities related to strengthening, flexibility, endurance, ROM for improvements in  [x] LE / Lumbar: LE, proximal hip, and core control with self care, mobility, lifting, ambulation. [] UE / Cervical: cervical, postural, scapular, scapulothoracic and UE control with self care, reaching, carrying, lifting, house/yardwork, driving, computer work.  [] (78252) Provided verbal/tactile cueing for activities related to improving balance, coordination, kinesthetic sense, posture, motor skill, proprioception to assist with   [] LE / lumbar: LE, proximal hip, and core control in self care, mobility, lifting, ambulation and eccentric single leg control.    [] UE / cervical: cervical, scapular, scapulothoracic and UE control with self care, reaching, carrying, lifting, house/yardwork, driving, computer work.   [] (87471) Therapist is in constant attendance of 2 or more patients providing skilled therapy interventions, but not providing any significant amount of measurable one-on-one time to either patient, for improvements in  [] LE / lumbar: LE, proximal hip, and core control in self care, mobility, lifting, ambulation and eccentric single leg control. [] UE / cervical: cervical, scapular, scapulothoracic and UE control with self care, reaching, carrying, lifting, house/yardwork, driving, computer work. NMR and Therapeutic Activities:    [x] (21701 or 61945) Provided verbal/tactile cueing for activities related to improving balance, coordination, kinesthetic sense, posture, motor skill, proprioception and motor activation to allow for proper function of   [x] LE: / Lumbar core, proximal hip and LE with self care and ADLs  [] UE / Cervical: cervical, postural, scapular, scapulothoracic and UE control with self care, carrying, lifting, driving, computer work.   [] (58698) Gait Re-education- Provided training and instruction to the patient for proper LE, core and proximal hip recruitment and positioning and eccentric body weight control with ambulation re-education including up and down stairs     Home Management Training / Self Care:  [] (99555) Provided self-care/home management training related to activities of daily living and compensatory training, and/or use of adaptive equipment for improvement with: ADLs and compensatory training, meal preparation, safety procedures and instruction in use of adaptive equipment, including bathing, grooming, dressing, personal hygiene, basic household cleaning and chores.      Home Exercise Program:    [x] (69141) Reviewed/Progressed HEP activities related to strengthening, flexibility, endurance, ROM of   [x] LE / Lumbar: core, proximal hip and LE for functional self-care, mobility, lifting and ambulation/stair navigation   [] UE / Cervical: cervical, postural, scapular, scapulothoracic and UE control with self care, reaching, carrying, lifting, house/yardwork, driving, computer work  [] (22690)Reviewed/Progressed HEP activities related to improving balance, coordination, kinesthetic sense, posture, motor skill, proprioception of   [] LE: core, proximal hip and LE for self care, mobility, lifting, and ambulation/stair navigation    [] UE / Cervical: cervical, postural,  scapular, scapulothoracic and UE control with self care, reaching, carrying, lifting, house/yardwork, driving, computer work    Manual Treatments:  PROM / STM / Oscillations-Mobs:  G-I, II, III, IV (PA's, Inf., Post.)  [] ([id number]) Provided manual therapy to mobilize LE, proximal hip and/or LS spine soft tissue/joints for the purpose of modulating pain, promoting relaxation,  increasing ROM, reducing/eliminating soft tissue swelling/inflammation/restriction, improving soft tissue extensibility and allowing for proper ROM for normal function with   [] LE / lumbar: self care, mobility, lifting and ambulation. [] UE / Cervical: self care, reaching, carrying, lifting, house/yardwork, driving, computer work. Modalities:  [] (98238) Vasopneumatic compression: Utilized vasopneumatic compression to decrease edema / swelling for the purpose of improving mobility and quad tone / recruitment which will allow for increased overall function including but not limited to self-care, transfers, ambulation, and ascending / descending stairs.      Charges:  Timed Code Treatment Minutes: 44   Total Treatment Minutes: 44     [] EVAL - LOW (75490)   [] EVAL - MOD (92906)  [] EVAL - HIGH (02643)  [] RE-EVAL (63958)  [x] AJ(42279) x 1       [] Ionto  [x] NMR (90235) x  1    [] Vaso  [] Manual (92765) x       [] Ultrasound  [x] TA x1      [] Mech Traction (21579)  [] Aquatic Therapy x      [] ES (un) (02379):   [] Home Management Training x  [] ES(attended) (46088)   [] Dry Needling 1-2 muscles (65721):  [] Dry Needling 3+ muscles (434337)  [] Group:      [] Gait Training  x 1     GOALS:  Patient stated goal:  Strengthen legs

## 2021-05-12 ENCOUNTER — HOSPITAL ENCOUNTER (OUTPATIENT)
Dept: PHYSICAL THERAPY | Age: 81
Setting detail: THERAPIES SERIES
Discharge: HOME | End: 2021-05-12
Payer: MEDICARE

## 2021-05-12 PROCEDURE — 97530 THERAPEUTIC ACTIVITIES: CPT

## 2021-05-12 PROCEDURE — 97112 NEUROMUSCULAR REEDUCATION: CPT

## 2021-05-12 PROCEDURE — 97110 THERAPEUTIC EXERCISES: CPT

## 2021-05-12 NOTE — FLOWSHEET NOTE
[address] [facility]  Phone: [phone]   Fax: [phone]   Physical Therapy Discharge Summary    Dear Dr. Briana Ledesma,    We had the pleasure of treating the following patient for physical therapy services at [facility]. A summary of our findings can be found in the discharge summary below. If you have any questions or concerns regarding these findings, please do not hesitate to contact me at the office phone number checked above. Thank you for the referral.     Physician Signature:________________________________Date:__________________  By signing above (or electronic signature), therapists plan is approved by physician      Functional Outcome:     : LE strength L/R hip 4+/5, knee 4+/5, ankle 5/5, sit to stand 11x in 1 min needed to use armrests several times, TUG 17 sec without A.D. Overall Response to Treatment:   [x]Patient is responding well to treatment and improvement is noted with regards  to goals   []Patient should continue to improve in reasonable time if they continue HEP   []Patient has plateaued and is no longer responding to skilled PT intervention    []Patient is getting worse and would benefit from return to referring MD   []Patient unable to adhere to initial POC   []Other:     Date range of Visits: 3/29 to    Total Visits: 16    Recommendation:    [x] Discharge to [facility]. Follow up with PT or MD PRN.      Physical Therapy Daily Treatment Note  Date:  2021    Patient Name:  Benny Meza    :  1940  MRN: [id number]  Medical/Treatment Diagnosis Information:  · Diagnosis: Imbalance R26.89  · Treatment Diagnosis: High fall risk, impaired gait mechanics, decreased balance, decreased LE strength, diminished proprioception  Insurance/Certification information:  PT Insurance Information: [employer] Medicare  Physician Information:  Referring Practitioner: Concepcion Carlisle  Plan of care signed (Y/N): [x]  Yes []  No PN cosign  and PN cosigned  Date of Patient follow up with Physician:      Progress Report: []  Yes  [x]  No     Date Range for reporting period:  Beginning:  3/29   Endin/21    Progress report due (10 Rx/or 30 days whichever is less): visit #18 or   (1st PN done visit #9)    Recertification due (POC duration/ or 90 days whichever is less): visit # 18 or     Visit # Insurance Allowable Auth required? Date Range     [place] []  Yes  [x]  No n/a     Latex Allergy:  [x]NO      []YES  Preferred Language for Healthcare:   [x]English       []other:    Functional Scale:        Date assessed:  LEFS: raw score = 32/80; dysfunction =  70%     LEFS:  Raw score = 57, 25%        Pain level:  0-3/10  L shoulder    SUBJECTIVE:   Franciscoo [employer] that she is doing good, no issues or episodes. [place] herself for 5 days there, and did good. Is planning on buying her a new lot at the [address] [place] [address] where it is closer to the daughter for more safety concerns. Doing great, helped cooked breakfast, and wash dishes, and doing laundry, shopping. Up/down steps for laundry      OBJECTIVE: 5/10: to dept no AD  : to PT with [facility]  : pt to PT no AD, per pt she forgot her cane. Amb with supervision/SBA with daughter  Pt amb in clinic with single point cane  : LE strength L/R hip 4/5, knee 4+/5, ankle 5/5, sit to stand 9x in 1 min needed to use armrests several times, TUG 22sec with cane  : 97% O2, 48 bpm, BP sitting 153/74    Ambulated into clinic with quad cane. Adjusted height of walker for patient with improved stride length and efficiency of movement.         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 hear\"    Exercises/Interventions:     Therapeutic Exercises (16361) Resistance / level Sets/sec Reps Notes   Nustep seat 7, arm 9 R (pt reports she fell on L obed last month and doesn't want to use on machine) L4 5 min     IB,   HR/TR  30\" X 2  2 x 10     Mat table (HEP 4/16)  Heel slides  SLR  Bridge  S/L clamshells    Forward Step-Ups  LSU        LAQ seated    Hip Marching seated    Gliders hip abd and ext holding barre    Mini sq   marches 15 ea    15  15            Stairs hip flexor stretch and hams stretch L/R  2x 20 sec                   Therapeutic Activities (65987)       Educated on gait mechanics with use of quad cane, gait training with quad cane     4/12: took pt vitals and talked with pt about weekend episodes       Endurance laps walking   2.75 lap     425'   Pt fatigued  SBA/CGA   Gait in ll bars with grey mats down Pt touching bars   Pt goal is to be able to go to trailer at [place], 4 steps rail B JB, then its all level, pt has handle in shower (walk in shower)  Practiced up/down dept 6\" steps with B rails Pt has life alert to wear there   Gait no AD    Gait with SPC          cues to maintain good posture and to improve heel strike   Cone  4 cones   1 x    4/21 TUG and sit to stand done    Sit to  1 min 9x push up from chair sometimes   Gait training-Alt Marching hand/knee, head nods/turns, 180* turns, varied speeds    Gait belt          Cable Column walkouts, 4 way 1 plate  X 3 each CGA          Up/down stairs in PT dept 6\" wit R rail     1     Neuromuscular Re-ed (47914)       Narrow RICHIE EO, EC    Feet close EC head nods and turns Airex      10ea     Supervision      CGA   Semi-Tandem RICHIE, Airex  20\" X 2 B4/14 Leans to left    Airex Hip Abduction   10 B   Cone TAPS   X 10 eachPartial UE support    Trunk twist L/R airex  360 turns L/R on Airex   10 x2 slowCGA  (hardest ex for pt)   SLS  3 x 10 sec   Light touch on bars   Shuttle Narrow RICHIE with head turns vert/horizontal   CGA    6\" step taps unsupported      Lateral band walk    Step-Up with alt marching Support with L UE only    Hurdles in // bars   4laps SBA/CGA                        Manual Intervention (69475)                                                     Modalities:     Pt. Education:  -patient educated on diagnosis, prognosis and expectations for rehab  -all patient questions were answered    Home Exercise Program:        Access Code: [email or web address]  URL: [employer]/  Date: 04/16/2021  Prepared by: Av Bell    Exercises  Supine Heel Slide - 2 x daily - 7 x weekly - 10 reps  Supine Pelvic Tilt with Straight Leg Raise - 2 x daily - 7 x weekly - 10 reps  Bridge - 2 x daily - 7 x weekly - 10 reps  Clamshell - 2 x daily - 7 x weekly - 10 reps  Walking - 10 x daily - 7 x weekly - 3-5 mins hold    4/12: Plan to progress when pt feeling better      Access Code: [email or web address]  URL: [employer]/  Date: 05/12/2021  Prepared by: Kurtis Ortega    Exercises  Supine Heel Slide - 2 x daily - 7 x weekly - 10 reps  Supine Pelvic Tilt with Straight Leg Raise - 2 x daily - 7 x weekly - 10 reps  Bridge - 2 x daily - 7 x weekly - 10 reps  Walking - 10 x daily - 7 x weekly - 3-5 mins hold  Clamshell with Resistance - 2 x daily - 7 x weekly - 10 reps - 2-3 sets  Standing 3-Way Kick - 2 x daily - 7 x weekly - 2-3 sets - 10 reps  Sideways Walking - 2 x daily - 7 x weekly - 10 reps - 2-3 sets  Standing Tandem Balance with Counter Support - 2 x daily - 7 x weekly - 1-2 sets - 5 reps - 10 secs hold  Mini Squat with Counter Support - 2 x daily - 7 x weekly - 10 reps - 2-3 sets  Standing March with Counter Support - 2 x daily - 7 x weekly - 10 reps - 2-3 sets    Therapeutic Exercise and NMR EXR  [x] (87538) Provided verbal/tactile cueing for activities related to strengthening, flexibility, endurance, ROM for improvements in  [x] LE / Lumbar: LE, proximal hip, and core control with self care, mobility, lifting, ambulation.   [] UE / Cervical: cervical, postural, scapular, scapulothoracic and UE control with self care, reaching, carrying, lifting, house/yardwork, driving, computer work.  [] (42349) Provided verbal/tactile cueing for activities related to improving balance, coordination, kinesthetic sense, posture, motor skill, proprioception to assist with   [] LE / lumbar: LE, proximal hip, and core control in self care, mobility, lifting, ambulation and eccentric single leg control. [] UE / cervical: cervical, scapular, scapulothoracic and UE control with self care, reaching, carrying, lifting, house/yardwork, driving, computer work.   [] (19092) Therapist is in constant attendance of 2 or more patients providing skilled therapy interventions, but not providing any significant amount of measurable one-on-one time to either patient, for improvements in  [] LE / lumbar: LE, proximal hip, and core control in self care, mobility, lifting, ambulation and eccentric single leg control. [] UE / cervical: cervical, scapular, scapulothoracic and UE control with self care, reaching, carrying, lifting, house/yardwork, driving, computer work.      NMR and Therapeutic Activities:    [x] (37668 or 76974) Provided verbal/tactile cueing for activities related to improving balance, coordination, kinesthetic sense, posture, motor skill, proprioception and motor activation to allow for proper function of   [x] LE: / Lumbar core, proximal hip and LE with self care and ADLs  [] UE / Cervical: cervical, postural, scapular, scapulothoracic and UE control with self care, carrying, lifting, driving, computer work.   [] (04484) Gait Re-education- Provided training and instruction to the patient for proper LE, core and proximal hip recruitment and positioning and eccentric body weight control with ambulation re-education including up and down stairs     Home Management Training / Self Care:  [] (60259) Provided self-care/home management training related to activities of daily living and compensatory training, and/or use of adaptive equipment for improvement with: ADLs and compensatory training, meal preparation, safety procedures and instruction in use of adaptive equipment, including bathing, grooming, dressing, personal hygiene, basic household cleaning and chores. Home Exercise Program:    [x] (99005) Reviewed/Progressed HEP activities related to strengthening, flexibility, endurance, ROM of   [x] LE / Lumbar: core, proximal hip and LE for functional self-care, mobility, lifting and ambulation/stair navigation   [] UE / Cervical: cervical, postural, scapular, scapulothoracic and UE control with self care, reaching, carrying, lifting, house/yardwork, driving, computer work  [] (41754)Reviewed/Progressed HEP activities related to improving balance, coordination, kinesthetic sense, posture, motor skill, proprioception of   [] LE: core, proximal hip and LE for self care, mobility, lifting, and ambulation/stair navigation    [] UE / Cervical: cervical, postural,  scapular, scapulothoracic and UE control with self care, reaching, carrying, lifting, house/yardwork, driving, computer work    Manual Treatments:  PROM / STM / Oscillations-Mobs:  G-I, II, III, IV (PA's, Inf., Post.)  [] (06371) Provided manual therapy to mobilize LE, proximal hip and/or LS spine soft tissue/joints for the purpose of modulating pain, promoting relaxation,  increasing ROM, reducing/eliminating soft tissue swelling/inflammation/restriction, improving soft tissue extensibility and allowing for proper ROM for normal function with   [] LE / lumbar: self care, mobility, lifting and ambulation. [] UE / Cervical: self care, reaching, carrying, lifting, house/yardwork, driving, computer work. Modalities:  [] (30155) Vasopneumatic compression: Utilized vasopneumatic compression to decrease edema / swelling for the purpose of improving mobility and quad tone / recruitment which will allow for increased overall function including but not limited to self-care, transfers, ambulation, and ascending / descending stairs.      Charges:  Timed Code Treatment Minutes: 44 return for scheduled/ recommended follow up visits, this note will serve as a discharge from care along with most recent update on progress.

## 2021-05-14 ENCOUNTER — APPOINTMENT (OUTPATIENT)
Dept: PHYSICAL THERAPY | Age: 81
End: 2021-05-14
Payer: MEDICARE

## 2021-05-17 ENCOUNTER — HOSPITAL ENCOUNTER (OUTPATIENT)
Dept: PHYSICAL THERAPY | Age: 81
Setting detail: THERAPIES SERIES
End: 2021-05-17
Payer: MEDICARE

## 2021-05-19 ENCOUNTER — APPOINTMENT (OUTPATIENT)
Dept: PHYSICAL THERAPY | Age: 81
End: 2021-05-19
Payer: MEDICARE

## 2021-05-21 ENCOUNTER — APPOINTMENT (OUTPATIENT)
Dept: PHYSICAL THERAPY | Age: 81
End: 2021-05-21
Payer: MEDICARE

## 2021-05-24 ENCOUNTER — APPOINTMENT (OUTPATIENT)
Dept: PHYSICAL THERAPY | Age: 81
End: 2021-05-24
Payer: MEDICARE

## 2021-05-26 ENCOUNTER — APPOINTMENT (OUTPATIENT)
Dept: PHYSICAL THERAPY | Age: 81
End: 2021-05-26
Payer: MEDICARE

## 2021-05-28 ENCOUNTER — APPOINTMENT (OUTPATIENT)
Dept: PHYSICAL THERAPY | Age: 81
End: 2021-05-28
Payer: MEDICARE

## 2021-06-11 RX ORDER — AMLODIPINE BESYLATE 5 MG/1
TABLET ORAL
Qty: 90 TABLET | Refills: 1 | Status: SHIPPED | OUTPATIENT
Start: 2021-06-11 | End: 2021-12-03

## 2021-06-14 ENCOUNTER — OFFICE VISIT (OUTPATIENT)
Dept: INTERNAL MEDICINE CLINIC | Age: 81
End: 2021-06-14
Payer: MEDICARE

## 2021-06-14 VITALS
WEIGHT: 123 LBS | SYSTOLIC BLOOD PRESSURE: 142 MMHG | DIASTOLIC BLOOD PRESSURE: 70 MMHG | HEART RATE: 56 BPM | HEIGHT: 61 IN | BODY MASS INDEX: 23.22 KG/M2

## 2021-06-14 DIAGNOSIS — R26.89 IMBALANCE: Primary | ICD-10-CM

## 2021-06-14 DIAGNOSIS — I10 ESSENTIAL HYPERTENSION: ICD-10-CM

## 2021-06-14 PROCEDURE — 99213 OFFICE O/P EST LOW 20 MIN: CPT | Performed by: INTERNAL MEDICINE

## 2021-06-14 SDOH — ECONOMIC STABILITY: FOOD INSECURITY: WITHIN THE PAST 12 MONTHS, YOU WORRIED THAT YOUR FOOD WOULD RUN OUT BEFORE YOU GOT MONEY TO BUY MORE.: NEVER TRUE

## 2021-06-14 SDOH — ECONOMIC STABILITY: FOOD INSECURITY: WITHIN THE PAST 12 MONTHS, THE FOOD YOU BOUGHT JUST DIDN'T LAST AND YOU DIDN'T HAVE MONEY TO GET MORE.: NEVER TRUE

## 2021-06-14 ASSESSMENT — SOCIAL DETERMINANTS OF HEALTH (SDOH): HOW HARD IS IT FOR YOU TO PAY FOR THE VERY BASICS LIKE FOOD, HOUSING, MEDICAL CARE, AND HEATING?: NOT HARD AT ALL

## 2021-06-14 NOTE — PROGRESS NOTES
Chief Complaint   Patient presents with    Fall     6 wk f/u weakness and falls  - Completed PT - she has had 1 fall since last visit      HPI:   The patient is returning to monitor her response to physical therapy. Since her last visit she has completed 6 weeks of physical therapy. She reports improvement in her balance. She has had 1 fall since completing physical therapy. Hypertension: She is taking her medication as prescribed. She does not routinely check her blood pressure at home. ROS:  Reg BMs        EXAM  BP (!) 142/70   Pulse 56   Ht 5' 1\" (1.549 m)   Wt 123 lb (55.8 kg)   BMI 23.24 kg/m²    GEN: WN/WD, NAD  CV: regular rate and rhythm, no murmurs rubs or gallops  Resp: normal effort, clear auscultation bilaterally  No peripheral edema       Lab Results   Component Value Date    TSHFT4 1.70 04/19/2021    TSH 1.82 05/14/2018      Lab Results   Component Value Date    LBTTYWCP39 >2000 (H) 04/19/2021       A/P  1. Imbalance  Chronic with some improvement following physical therapy. The patient was encouraged to continue with a home exercise program now that therapy has been completed. She was advised to use a walker with ambulation. She will continue alendronate for fracture risk reduction as she also has osteoporosis. 2. Essential hypertension  Blood pressure is in acceptable range based on her age and comorbid conditions. She will continue amlodipine, HCTZ, lisinopril, and propranolol.

## 2021-07-15 RX ORDER — HYDROCHLOROTHIAZIDE 12.5 MG/1
12.5 TABLET ORAL DAILY
Qty: 90 TABLET | Refills: 0 | Status: SHIPPED | OUTPATIENT
Start: 2021-07-15 | End: 2021-10-11

## 2021-09-07 RX ORDER — SIMVASTATIN 20 MG
TABLET ORAL
Qty: 90 TABLET | Refills: 3 | Status: SHIPPED | OUTPATIENT
Start: 2021-09-07 | End: 2022-11-02

## 2021-09-08 ENCOUNTER — OFFICE VISIT (OUTPATIENT)
Dept: INTERNAL MEDICINE CLINIC | Age: 81
End: 2021-09-08
Payer: MEDICARE

## 2021-09-08 VITALS
WEIGHT: 121 LBS | DIASTOLIC BLOOD PRESSURE: 70 MMHG | HEART RATE: 56 BPM | SYSTOLIC BLOOD PRESSURE: 138 MMHG | BODY MASS INDEX: 22.86 KG/M2

## 2021-09-08 DIAGNOSIS — R26.89 IMBALANCE: ICD-10-CM

## 2021-09-08 DIAGNOSIS — I10 ESSENTIAL HYPERTENSION: Primary | ICD-10-CM

## 2021-09-08 DIAGNOSIS — M48.062 LUMBAR STENOSIS WITH NEUROGENIC CLAUDICATION: ICD-10-CM

## 2021-09-08 DIAGNOSIS — E78.2 MIXED HYPERLIPIDEMIA: ICD-10-CM

## 2021-09-08 DIAGNOSIS — G25.0 ESSENTIAL TREMOR: ICD-10-CM

## 2021-09-08 DIAGNOSIS — F32.1 CURRENT MODERATE EPISODE OF MAJOR DEPRESSIVE DISORDER WITHOUT PRIOR EPISODE (HCC): ICD-10-CM

## 2021-09-08 DIAGNOSIS — M81.0 AGE-RELATED OSTEOPOROSIS WITHOUT CURRENT PATHOLOGICAL FRACTURE: ICD-10-CM

## 2021-09-08 PROCEDURE — 99214 OFFICE O/P EST MOD 30 MIN: CPT | Performed by: NURSE PRACTITIONER

## 2021-09-08 RX ORDER — SERTRALINE HYDROCHLORIDE 100 MG/1
150 TABLET, FILM COATED ORAL DAILY
Qty: 135 TABLET | Refills: 1 | Status: SHIPPED | OUTPATIENT
Start: 2021-09-08 | End: 2022-06-14

## 2021-09-08 NOTE — PROGRESS NOTES
9/8/21     Chief Complaint   Patient presents with   [address] assisted living. [address] on [address]     HPI     Here for follow up of HTN, HLD, osteoporosis, BET, depression, chronic back pain. Reports doing well on medications and denies any concerns today. She is not fasting . Has forms to complete for [facility], will be going to independent living. Room will be available in 2 weeks     Allergies   Allergen Reactions    Levofloxacin Anxiety    Doxycycline      Red rash     Penicillins Swelling     Current Outpatient Medications   Medication Sig Dispense Refill    sertraline (ZOLOFT) 100 MG tablet Take 1.5 tablets by mouth daily 135 tablet 1    simvastatin (ZOCOR) 20 MG tablet TAKE 1 TABLET NIGHTLY 90 tablet 3    alendronate (FOSAMAX) 70 MG tablet TAKE 1 TABLET BY MOUTH ONE TIME PER WEEK 12 tablet 3    hydroCHLOROthiazide (HYDRODIURIL) 12.5 MG tablet Take 1 tablet by mouth daily 90 tablet 0    amLODIPine (NORVASC) 5 MG tablet TAKE 1 TABLET BY MOUTH EVERY DAY 90 tablet 1    propranolol (INDERAL) 40 MG tablet TAKE 1 TABLET TWICE A  tablet 3    lisinopril (PRINIVIL;ZESTRIL) 40 MG tablet TAKE 1 TABLET BY MOUTH EVERY DAY 90 tablet 3    aspirin 81 MG EC tablet Take 81 mg by mouth daily      albuterol sulfate HFA (PROVENTIL HFA) 108 (90 Base) MCG/ACT inhaler Inhale 2 puffs into the lungs every 6 hours as needed for Wheezing Any inhaler insurance will cover 1 Inhaler 1     No current facility-administered medications for this visit. Review of Systems  Negative other than HPI    Vitals:    09/08/21 1148 09/08/21 1154 09/08/21 1200   BP: (!) 148/70 (!) 148/70 138/70   Pulse: 56     Weight: 121 lb (54.9 kg)        Physical Exam  Vitals reviewed. Constitutional:       General: She is not in acute distress. Appearance: Normal appearance. She is well-developed. She is not diaphoretic. HENT:      Head: Normocephalic and atraumatic.    Cardiovascular:      Rate and Rhythm: Normal rate and regular rhythm. Heart sounds: Normal heart sounds. Pulmonary:      Effort: Pulmonary effort is normal. No respiratory distress. Breath sounds: Normal breath sounds. Skin:     General: Skin is warm and dry. Neurological:      General: No focal deficit present. Mental Status: She is alert and oriented to person, place, and time. Psychiatric:         Mood and Affect: Mood and affect normal.         Behavior: Behavior normal.         Assessment/Plan:  1. Essential hypertension  Stable, controlled on current regimen. 2. Age-related osteoporosis without current pathological fracture  Stable, controlled on current regimen. 3. Essential tremor  Stable, controlled on current regimen. .    4. Mixed hyperlipidemia  Stable, controlled on current regimen. 5. Current moderate episode of major depressive disorder without prior episode (HCC)  Stable, controlled on current regimen. 6. Lumbar stenosis with neurogenic claudication  Stable, controlled on current regimen. 7. Imbalance   Stable, controlled on current regimen. Much better after completing PT     Pt reports she has a living will and HPOA at home - will give a copy to [facility] care   Discussed code status and pt verbalized that she does not want CPR or life support at this time. [facility] DNR forms complete. Discussed medications with patient, who voiced understanding of their use and indications. All questions answered. Return in about 3 months (around 12/8/2021).       Electronically signed by YOSEF Rodriguez CNP on 9/8/2021 at 12:44 PM

## 2021-09-22 NOTE — ED NOTES
Hospitalist Progress Note      PCP: No primary care provider on file. Date of Admission: 9/9/2021    Chief Complaint:     Altered Mental Status       pt brought to ed by [facility] after being found by family sitting in chair unresponsive but jill bs for squad was 79 1 amp given in route patient moaning at triage           Hospital Course:      No family at bedside to provide collateral history. History obtained from discussion with ER provider  Apparently file still being managed and has presented by different names in the past     The patient is a unknown y.o. adult with history of cirrhosis who apparently presented with family having been found by family poorly responsive who initiated CPR. EMS was called and on arrival to the ER patient was intubated. Post intubation he developed hypotension requiring fluid resuscitation with some improvement. Laboratory work-up was noted for ammonia of 136, lipase of 166, troponin of 0.10, sodium 122, potassium 2.9, CO2 of 18, creatinine of 2.0 and transaminitis with ,   CT chest abdomen pelvis revealed cirrhosis with portal hypertension and diffuse gallbladder thickening concerning for acute versus chronic cholecystitis with gallbladder sludge  CT brain with no acute intracranial pathology  EKG sinus rhythm  Urinalysis negative  He is notably jaundiced    Subjective: Intubated sedated, FiO2 30%, still on Levophed 4 mics, LFTs worsening, hypernatremia, creatinine improving 1.7 ammonia improving 55    Patient is intubated and sedated, currently FiO2 30%, on Levophed at 16 mics, worsening creatinine 1.9, nephrology on board, given albumin and Lasix, no urgent RRT , abdominal distention KUB pending    Family meet ing today and made [facility] and plan to withdraw care.      Medications:  Reviewed    Infusion Medications    vasopressin (Septic Shock) infusion Stopped (09/21/21 1603)    propofol Stopped (09/21/21 1615)    dexmedetomidine Stopped Pt. Placed in sling with ice pack provided to pt. The patient is advised to apply ice or cold packs intermittently as needed to relieve pain.        Brent Wise RN  04/22/19 [id number] (09/21/21 1634)    sodium chloride      sodium chloride Stopped (09/20/21 1212)    norepinephrine Stopped (09/21/21 1630)    fentaNYL 75 mcg/kg/hr (09/21/21 1609)     Scheduled Medications    bisacodyl  10 mg Rectal Once    furosemide  20 mg IntraVENous BID    vancomycin  250 mg Oral 2 times per day    metroNIDAZOLE  500 mg IntraVENous Q8H    lactobacillus  1 capsule Per NG tube BID WC    polyethylene glycol  17 g Oral Daily    sennosides-docusate sodium  2 tablet Oral BID    pantoprazole  40 mg IntraVENous BID    levofloxacin  750 mg IntraVENous Q48H    sodium chloride flush  5-40 mL IntraVENous 2 times per day    ipratropium-albuterol  1 ampule Inhalation 4x daily    rifaximin  550 mg Per NG tube BID    Empty Capsule #00 Purple/White  1 each Does not apply Prior to discharge     PRN Meds: dextrose, morphine, LORazepam, sodium chloride, sodium chloride flush, sodium chloride, ondansetron **OR** ondansetron, acetaminophen **OR** acetaminophen, potassium chloride      Intake/Output Summary (Last 24 hours) at 9/21/2021 2025  Last data filed at 9/21/2021 1334  Gross per 24 hour   Intake 2325.4 ml   Output 50 ml   Net 2275.4 ml       Physical Exam Performed:    BP (!) 89/53   Pulse 87   Temp 99.7 °F (37.6 °C) (Axillary)   Resp 25   Ht 5' 5\" (1.651 m)   Wt 190 lb 11.2 oz (86.5 kg)   SpO2 (!) 87%   BMI 31.73 kg/m²     General appearance: visibly jaundiced  HEENT: Pupils equal, round, and reactive to light. Has scleral icterus. Neck: Supple, with full range of motion. No jugular venous distention. Trachea midline. Respiratory:  Vented. . Clear to auscultation, bilaterally without Rales/Wheezes/Rhonchi. Cardiovascular: Regular rate and rhythm with normal S1/S2 without murmurs, rubs or gallops. Abdomen: Soft,  mildlyy distended. BS +   Musculoskeletal: No clubbing, cyanosis or edema bilaterally. Full range of motion without deformity.   Skin: Skin color, texture, turgor normal.  No rashes or lesions. Neurologic: Intubated and sedated  Psychiatric: sedated  Capillary Refill: Brisk,3 seconds, normal   Peripheral Pulses: +2 palpable, equal bilaterally       Labs:   Recent Labs     09/19/21  0509 09/20/21  0745   WBC 25.4* 23.7*   HGB 8.4* 8.6*   HCT 24.7* 25.5*   PLT 39* 37*     Recent Labs     09/19/21  0509 09/20/21  0508 09/21/21  0445   * 147* 142   K 3.5 4.5 4.5   * 116* 111*   CO2 19* 17* 16*   BUN 71* 79* 87*   CREATININE 1.1 1.9* 2.8*   CALCIUM 9.6 9.5 9.3   PHOS 3.7 4.4 4.8     Recent Labs     09/19/21  0509 09/20/21  0508 09/21/21  0445   * 116* 176*   ALT 96* 91* 93*   BILITOT 27.8* 24.5* 25.3*   ALKPHOS 182* 183* 149*     Recent Labs     09/19/21  0509 09/20/21  0508 09/21/21  0444   INR 2.81* 2.60* 2.68*     Recent Labs     09/19/21  0509 09/20/21  0508 09/21/21  0445   CKTOTAL 28* 28* 64       Urinalysis:      Lab Results   Component Value Date    NITRU Negative 09/09/2021    WBCUA 0-2 09/09/2021    BACTERIA 1+ 09/09/2021    RBCUA 3-4 09/09/2021    BLOODU TRACE-LYSED 09/09/2021    SPECGRAV 1.020 09/09/2021    GLUCOSEU 100 09/09/2021       Radiology:  XR CHEST PORTABLE   Final Result   Worsening bilateral interstitial opacities. Line and tubes as above. XR CHEST PORTABLE   Final Result   Line and tubes as above. Low lung volumes. Bilateral pulmonary interstitial opacities, representing   infection. XR ABDOMEN (2 VIEWS)   Final Result   Continued distention of small and large bowel similar to the previous exam   suggesting a severe ileus         CT ABDOMEN PELVIS WO CONTRAST Additional Contrast? None   Final Result   1. Compared to 09/15/2021, no significant interval changes appreciated. 2. Liver cirrhosis with mild to moderate volume ascites. Moderate anasarca. 3. Prominent gaseous and fluid distension of the colon, grossly similar as of   09/15/2021.   4. Cholelithiasis and/or sludge in the gallbladder lumen.          XR ABDOMEN (KUB) (SINGLE AP VIEW)   Final Result   Moderate persistent diffuse small bowel distension without evident colonic   distension. Distal small bowel obstruction could be present. RECOMMENDATION:   Continued follow-up with serial films suggested. Repeat CT scan suggested if   patient not improving. XR CHEST PORTABLE   Final Result   Slightly improved aeration in the lower lung zones bilaterally. CT ABDOMEN PELVIS WO CONTRAST Additional Contrast? None   Final Result   1. Diffuse gastrointestinal distension, likely due to ileus   2. Fluid throughout the colon and rectum. Correlate with any history of   diarrhea. No evident colonic wall thickening to indicate colitis   3. Cirrhosis with small ascites   4. Stones versus sludge in the gallbladder   5. Atelectasis or pneumonia in the lung bases with small left pleural effusion         XR CHEST PORTABLE   Final Result   Low lung volumes with bibasilar effusions. Support tubes as described above. XR CHEST PORTABLE   Final Result   Bibasilar hypoaeration again noted with persistent left basilar opacity   suggesting atelectasis         IR US GUIDED PARACENTESIS   Final Result   Successful ultrasound guided paracentesis. US GALLBLADDER RUQ   Final Result   Gallbladder wall is thickened. This is a nonspecific finding in the presence   of ascites as it may simply be reactive, rather than secondary to underlying   cholecystitis      Low level echoes are seen within the gallbladder, either stones or sludge. Cirrhosis with moderate ascites         XR CHEST PORTABLE   Final Result   New central venous catheter as above, approach is unclear. CT ABDOMEN PELVIS WO CONTRAST Additional Contrast? None   Final Result   1. Hepatic cirrhosis with associated portal venous hypertension and scattered   moderate intraperitoneal free fluid. 2. Diffuse gallbladder wall thickening suggesting association with chronic   versus acute cholecystitis.    3. Suspected gallbladder lumen dependent radiodense sludge. 4. Mild cardiomegaly. CT CHEST WO CONTRAST   Final Result   1. Hepatic cirrhosis with associated portal venous hypertension and scattered   moderate intraperitoneal free fluid. 2. Diffuse gallbladder wall thickening suggesting association with chronic   versus acute cholecystitis. 3. Suspected gallbladder lumen dependent radiodense sludge. 4. Mild cardiomegaly. CT Head WO Contrast   Final Result   No acute intracranial abnormality. XR CHEST PORTABLE   Final Result   Endotracheal tube and orogastric tube are acceptable. Bibasilar opacities are again noted. XR CHEST PORTABLE   Final Result   Reticulation at the left base more than right is nonspecific. Could relate   to subsegmental atelectasis, aspiration pneumonitis, or early pneumonia.                  Assessment/Plan:    Active Hospital Problems    Diagnosis     Moderate malnutrition ([facility] [address] [E44.0]     Thickening of wall of gallbladder [K82.8]     Liver failure with hepatic coma (HCC) [K72.91]     Septic shock ([facility] [address] [A41.9, R65.21]     Infection due to Stenotrophomonas maltophilia [A49.8]     On mechanically assisted ventilation (HCC) [Z99.11]     Other ascites [R18.8]     Gram-negative pneumonia (HCC) [P85.2]     Alcoholic cirrhosis of liver with ascites (HCC) [K70.31]     Anasarca [R60.1]     Gallbladder sludge [K82.8]     Coagulopathy ([facility] [address] [D68.9]     Screening for HIV (human immunodeficiency virus) [Z11.4]     Alcohol abuse [F10.10]     Hepatic encephalopathy ([facility] [address] [K72.90]     Hyperammonemia (HCC) [E72.20]     Elevated lactic acid level [P52.11]     Metabolic acidosis, increased anion gap (IAG) [E87.2]     Elevated troponin [R77.8]     Hypokalemia [E87.6]     Hyponatremia [E87.1]     Hypotension [I95.9]     Anemia [D64.9]     GI bleed [K92.2]     Transaminitis [R74.01]        Acute respiratory failure/pneumonia  Secondary to hepatic encephalopathy and ascites. He remains intubated and sedated, [facility] on board    Growing stenotrophomonas in sputum currently on Levaquin    Hepatic encephalopathy  Appears to be end-stage cirrhosis at this time or at minimum decompensated cirrhosis. Ammonia 72>86>76>60, on lactulose and rifaximin, GI on board. Hypernatremia-sodium 152>149, nephrology on board, increase free water to 250 mill every 6hr     Elevated liver functions and jaundice  Appears to be related to alcoholic cirrhosis  Unclear if the patient has any hepatitis history. GI is following    Diffuse gastrointestinal distention likely due to ileus, on abdominal x-ray, NG tube, rectal tube per GI, can give lactulose rectal, repeated KUB 9/18 with moderate persistent diffuse small bowel distention without evident colonic distention,? Distal small bowel obstruction, discussed with critical care medicine, CT abdomen noted, lactic acid one-point 4 GI holding lactulose. Given tap water enema    Acute metabolic acidosis  Appears to be a combination of acute kidney injury possible sepsis lactic acidosis and decompensated cirrhosis. We are working to correct the patient's abnormalities  Nephrology is following as well, had albumin, no urgent RRT   Creatinine 3.4>1.7> 1.9 trending down    Acute kidney injury, worsening creatinine 1.9  Secondary to all of the above, Cr 3.4>2.7>2.3  Nephrology is following as noted above  Continue to monitor renal function  Nephrologist given Lasix and albumin, no urgent need for RRT     Anemia with possible GI bleed  Patient has been started on octreotide  He is on PPI as well  Gastroenterology has been consulted for his cirrhosis as well as for the possible bleed. GI has determined that this is secondary to hemorrhoids General surgery was consulted it would be more dangerous to do a surgery for the hemorrhoid then it would be to just allow it to ooze a little bit.   Banding would also be more dangerous Thrombocytopenia  Secondary to liver disease    DVT Prophylaxis: SCDs  Diet: Diet NPO  Code Status: DNR-CC    PT/OT Eval Status: When appropriate    Dispo -patient is critically ill medical decision making is high, continue ICU care, currently ventilated, on 16 mics of Levophed, creatinine worsening, 1.9, nephrology on board, given albumin and Lasix, no urgent need for RRT, abdominal distention, GI repeating KUB. Lactulose has been on hold for abdominal distention, rectal tube, tapwater enema, had been declined for transfer for liver transplant by   Family has moved to comfort care, code status changed.     35 minutes of critical care time spent    Linsey Dangelo MD

## 2021-10-11 RX ORDER — HYDROCHLOROTHIAZIDE 12.5 MG/1
TABLET ORAL
Qty: 90 TABLET | Refills: 1 | Status: SHIPPED | OUTPATIENT
Start: 2021-10-11 | End: 2022-04-21

## 2021-10-14 RX ORDER — LISINOPRIL 40 MG/1
TABLET ORAL
Qty: 90 TABLET | Refills: 3 | Status: SHIPPED | OUTPATIENT
Start: 2021-10-14 | End: 2022-10-19

## 2021-12-03 RX ORDER — AMLODIPINE BESYLATE 5 MG/1
TABLET ORAL
Qty: 90 TABLET | Refills: 1 | Status: SHIPPED | OUTPATIENT
Start: 2021-12-03 | End: 2022-06-14

## 2021-12-13 ENCOUNTER — OFFICE VISIT (OUTPATIENT)
Dept: INTERNAL MEDICINE CLINIC | Age: 81
End: 2021-12-13
Payer: MEDICARE

## 2021-12-13 VITALS
WEIGHT: 126 LBS | SYSTOLIC BLOOD PRESSURE: 128 MMHG | DIASTOLIC BLOOD PRESSURE: 60 MMHG | HEART RATE: 60 BPM | HEIGHT: 61 IN | BODY MASS INDEX: 23.79 KG/M2

## 2021-12-13 DIAGNOSIS — F32.5 MAJOR DEPRESSIVE DISORDER WITH SINGLE EPISODE, IN FULL REMISSION (HCC): ICD-10-CM

## 2021-12-13 DIAGNOSIS — Z91.81 AT HIGH RISK FOR FALLS: ICD-10-CM

## 2021-12-13 DIAGNOSIS — N39.46 MIXED STRESS AND URGE URINARY INCONTINENCE: ICD-10-CM

## 2021-12-13 DIAGNOSIS — E78.2 MIXED HYPERLIPIDEMIA: ICD-10-CM

## 2021-12-13 DIAGNOSIS — I10 ESSENTIAL HYPERTENSION: Primary | ICD-10-CM

## 2021-12-13 DIAGNOSIS — M81.0 AGE-RELATED OSTEOPOROSIS WITHOUT CURRENT PATHOLOGICAL FRACTURE: ICD-10-CM

## 2021-12-13 LAB
ALBUMIN SERPL-MCNC: 4.4 G/DL (ref 3.4–5)
ANION GAP SERPL CALCULATED.3IONS-SCNC: 14 MMOL/L (ref 3–16)
BUN BLDV-MCNC: 28 MG/DL (ref 7–20)
CALCIUM SERPL-MCNC: 9.6 MG/DL (ref 8.3–10.6)
CHLORIDE BLD-SCNC: 100 MMOL/L (ref 99–110)
CHOLESTEROL, TOTAL: 197 MG/DL (ref 0–199)
CO2: 23 MMOL/L (ref 21–32)
CREAT SERPL-MCNC: 1.1 MG/DL (ref 0.6–1.2)
GFR AFRICAN AMERICAN: 57
GFR NON-AFRICAN AMERICAN: 48
GLUCOSE BLD-MCNC: 91 MG/DL (ref 70–99)
HDLC SERPL-MCNC: 42 MG/DL (ref 40–60)
LDL CHOLESTEROL CALCULATED: 118 MG/DL
PHOSPHORUS: 4.6 MG/DL (ref 2.5–4.9)
POTASSIUM SERPL-SCNC: 4.2 MMOL/L (ref 3.5–5.1)
SODIUM BLD-SCNC: 137 MMOL/L (ref 136–145)
TRIGL SERPL-MCNC: 184 MG/DL (ref 0–150)
VLDLC SERPL CALC-MCNC: 37 MG/DL

## 2021-12-13 PROCEDURE — 99214 OFFICE O/P EST MOD 30 MIN: CPT | Performed by: INTERNAL MEDICINE

## 2021-12-13 NOTE — PROGRESS NOTES
Chief Complaint   Patient presents with    Hypertension    Hyperlipidemia    Depression    Urinary Retention     Pt reports her urinary retention is getting worse. she just goes and doesnt know she is going     Fall     Pt has had 3 falls since being in the facility. only bruised herself and scratched up skin     HPI:  HTN: The patient is tolerating blood pressure medication well and taking them as directed. BP control outside of the office is reported as well controlled. No symptoms concerning for end organ damage are present. HLD: taking simvastatin and tolerating well. Depression: her mood is pretty good. She feels like sertraline helps. Urinary incontinence: this is chronic and worsening over the past several months. She is living at [facility].   She gets help with food and housekeeping      Social History     Tobacco Use    Smoking status: Current Every Day Smoker     Packs/day: 0.10     Years: 50.00     Pack years: 5.00     Types: Cigarettes    Smokeless tobacco: Never Used   Vaping Use    Vaping Use: Never used   Substance Use Topics    Alcohol use: Yes     Comment: very rare sometimes a glass of wine    Drug use: No         ROS:  CV: Neg for chest pain  RESP: neg for dyspnea  : Neg for dysuria    EXAM  /60   Pulse 60   Ht 5' 1\" (1.549 m)   Wt 126 lb (57.2 kg)   BMI 23.81 kg/m²    GEN: WN/WD, NAD  CV: regular rate and rhythm, no murmurs rubs or gallops  Resp: normal effort, clear auscultation bilaterally  No peripheral edema       Lab Results   Component Value Date    CREATININE 1.3 (H) 04/19/2021    BUN 58 (H) 04/19/2021     04/19/2021    K 4.2 04/19/2021     04/19/2021    CO2 23 04/19/2021     Lab Results   Component Value Date    CHOL 184 02/19/2020    CHOL 170 07/19/2018    CHOL 168 07/11/2017     Lab Results   Component Value Date    TRIG 189 (H) 02/19/2020    TRIG 170 (H) 07/19/2018    TRIG 272 (H) 07/11/2017     Lab Results   Component Value Date    HDL 37 (L) 02/19/2020    HDL 37 (L) 07/19/2018    HDL 31 (L) 07/11/2017     Lab Results   Component Value Date    LDLCALC 109 (H) 02/19/2020    LDLCALC 99 07/19/2018    LDLCALC 83 07/11/2017     Lab Results   Component Value Date    LABVLDL 38 02/19/2020    LABVLDL 34 07/19/2018    LABVLDL 54 07/11/2017     No results found for: CHOLHDLRATIO     DEXA from 2020 c/w osteoporosis      A/P  1. Essential hypertension  Chronic and well controlled. The current medical regimen is effective;  continue present plan and medications. - Renal Function Panel    2. Mixed hyperlipidemia  Chronic and well controlled. The current medical regimen is effective;  continue present plan and medications. - Lipid Panel    3. Age-related osteoporosis without current pathological fracture  Chronic and stable  Continue alendronate    4. Mixed stress and urge urinary incontinence  chronic and worsening. She failed to improve with medication and Kegel's. She will consider a urology referral    5. Major depressive disorder with single episode, in full remission ([address]  Chronic and well controlled. The current medical regimen is effective;  continue present plan and medications. 6. At high risk for falls     On the basis of positive falls risk screening, assessment and plan is as follows: using walker. [facility]. The patient is unable to leave her home without assistance due to weakness and imbalance.   Refer for home PT      RTO 6 months

## 2021-12-13 NOTE — Clinical Note
Please place a home PT referral for [facility]. They might offer this through [address] [place] [address].

## 2022-02-22 NOTE — TELEPHONE ENCOUNTER
Patient is wanting to know where do she come in at tomorrow for her procedure. Please call her at [phone].
No assistance needed

## 2022-04-07 RX ORDER — PROPRANOLOL HYDROCHLORIDE 40 MG/1
TABLET ORAL
Qty: 180 TABLET | Refills: 3 | Status: SHIPPED | OUTPATIENT
Start: 2022-04-07

## 2022-04-21 RX ORDER — HYDROCHLOROTHIAZIDE 12.5 MG/1
TABLET ORAL
Qty: 90 TABLET | Refills: 1 | Status: SHIPPED | OUTPATIENT
Start: 2022-04-21 | End: 2022-10-19

## 2022-06-13 ENCOUNTER — OFFICE VISIT (OUTPATIENT)
Dept: INTERNAL MEDICINE CLINIC | Age: 82
End: 2022-06-13
Payer: MEDICARE

## 2022-06-13 VITALS
WEIGHT: 130 LBS | DIASTOLIC BLOOD PRESSURE: 76 MMHG | SYSTOLIC BLOOD PRESSURE: 132 MMHG | HEIGHT: 61 IN | HEART RATE: 68 BPM | BODY MASS INDEX: 24.55 KG/M2

## 2022-06-13 DIAGNOSIS — M79.89 RIGHT LEG SWELLING: ICD-10-CM

## 2022-06-13 DIAGNOSIS — N18.31 STAGE 3A CHRONIC KIDNEY DISEASE (HCC): ICD-10-CM

## 2022-06-13 DIAGNOSIS — I10 ESSENTIAL HYPERTENSION: Primary | ICD-10-CM

## 2022-06-13 DIAGNOSIS — E78.2 MIXED HYPERLIPIDEMIA: ICD-10-CM

## 2022-06-13 DIAGNOSIS — F32.5 MAJOR DEPRESSIVE DISORDER WITH SINGLE EPISODE, IN FULL REMISSION (HCC): ICD-10-CM

## 2022-06-13 PROBLEM — N18.30 CHRONIC RENAL DISEASE, STAGE III (HCC): Status: ACTIVE | Noted: 2022-06-13

## 2022-06-13 PROCEDURE — 99214 OFFICE O/P EST MOD 30 MIN: CPT | Performed by: INTERNAL MEDICINE

## 2022-06-13 PROCEDURE — 1123F ACP DISCUSS/DSCN MKR DOCD: CPT | Performed by: INTERNAL MEDICINE

## 2022-06-13 ASSESSMENT — PATIENT HEALTH QUESTIONNAIRE - PHQ9
4. FEELING TIRED OR HAVING LITTLE ENERGY: 3
1. LITTLE INTEREST OR PLEASURE IN DOING THINGS: 0
2. FEELING DOWN, DEPRESSED OR HOPELESS: 1
9. THOUGHTS THAT YOU WOULD BE BETTER OFF DEAD, OR OF HURTING YOURSELF: 0
SUM OF ALL RESPONSES TO PHQ QUESTIONS 1-9: 8
SUM OF ALL RESPONSES TO PHQ QUESTIONS 1-9: 8
5. POOR APPETITE OR OVEREATING: 0
3. TROUBLE FALLING OR STAYING ASLEEP: 2
7. TROUBLE CONCENTRATING ON THINGS, SUCH AS READING THE NEWSPAPER OR WATCHING TELEVISION: 0
6. FEELING BAD ABOUT YOURSELF - OR THAT YOU ARE A FAILURE OR HAVE LET YOURSELF OR YOUR FAMILY DOWN: 0
SUM OF ALL RESPONSES TO PHQ QUESTIONS 1-9: 8
8. MOVING OR SPEAKING SO SLOWLY THAT OTHER PEOPLE COULD HAVE NOTICED. OR THE OPPOSITE, BEING SO FIGETY OR RESTLESS THAT YOU HAVE BEEN MOVING AROUND A LOT MORE THAN USUAL: 2
SUM OF ALL RESPONSES TO PHQ QUESTIONS 1-9: 8
SUM OF ALL RESPONSES TO PHQ9 QUESTIONS 1 & 2: 1
10. IF YOU CHECKED OFF ANY PROBLEMS, HOW DIFFICULT HAVE THESE PROBLEMS MADE IT FOR YOU TO DO YOUR WORK, TAKE CARE OF THINGS AT HOME, OR GET ALONG WITH OTHER PEOPLE: 0

## 2022-06-13 NOTE — PROGRESS NOTES
Chief Complaint   Patient presents with    Hypertension    Hyperlipidemia    Depression     HPI  HTN: The patient is tolerating blood pressure medication well and taking them as directed. BP control outside of the office is reported as well controlled. No symptoms concerning for end organ damage are present. HLD: She is taking simvastatin as prescribed and tolerating treatment well. MDD: her mood has been faired. She feels like the sertraline helps. Right leg swelling. This has been present intermittently for several months. It improves overnight when she is in bed and gets worse throughout the day.     Social History     Tobacco Use    Smoking status: Current Every Day Smoker     Packs/day: 0.10     Years: 50.00     Pack years: 5.00     Types: Cigarettes    Smokeless tobacco: Never Used   Vaping Use    Vaping Use: Never used   Substance Use Topics    Alcohol use: Yes     Comment: very rare sometimes a glass of wine    Drug use: No       ROS  Chronic cough  Neg for chest pain    EXAM  /76   Pulse 68   Ht 5' 1\" (1.549 m)   Wt 130 lb (59 kg)   BMI 24.56 kg/m²    GEN: WN/WD  CV: RRR, no m/r/g  RESP: nl effort, CTAB  There is 1+ edema of the RLE; no edema of the LLE    Lab Results   Component Value Date    CREATININE 1.1 12/13/2021    BUN 28 (H) 12/13/2021     12/13/2021    K 4.2 12/13/2021     12/13/2021    CO2 23 12/13/2021     Lab Results   Component Value Date    CHOL 197 12/13/2021    CHOL 184 02/19/2020    CHOL 170 07/19/2018     Lab Results   Component Value Date    TRIG 184 (H) 12/13/2021    TRIG 189 (H) 02/19/2020    TRIG 170 (H) 07/19/2018     Lab Results   Component Value Date    HDL 42 12/13/2021    HDL 37 (L) 02/19/2020    HDL 37 (L) 07/19/2018     Lab Results   Component Value Date    LDLCALC 118 (H) 12/13/2021    LDLCALC 109 (H) 02/19/2020    LDLCALC 99 07/19/2018     Lab Results   Component Value Date    LABVLDL 37 12/13/2021    LABVLDL 38 02/19/2020    LABVLDL 34 07/19/2018     No results found for: BALJITRONALDO     A/P  1. Essential hypertension  Blood pressure control is optimal on current regimen. She will continue amlodipine 5 mg daily, lisinopril 40 mg daily, HCTZ 12.5 mg daily, propranolol 40 mg twice daily. 2. Mixed hyperlipidemia  Chronic, stable. Continue simvastatin 20 mg daily. 3. Major depressive disorder with single episode, in full remission ([facility] [address]  Doing well on current regimen. Continue sertraline 150 mg daily. 4. Right leg swelling  This is a chronic problem most likely related to venous insufficiency. However, a DVT should be ruled out. Venous Doppler ordered. - VL Extremity Venous Right; Future    5. Stage 3a chronic kidney disease (HCC)  Chronic, stable  Recommended that she discontinue ibuprofen and take APAP instead.

## 2022-06-14 RX ORDER — AMLODIPINE BESYLATE 5 MG/1
TABLET ORAL
Qty: 90 TABLET | Refills: 3 | Status: SHIPPED | OUTPATIENT
Start: 2022-06-14

## 2022-06-14 RX ORDER — SERTRALINE HYDROCHLORIDE 100 MG/1
TABLET, FILM COATED ORAL
Qty: 135 TABLET | Refills: 3 | Status: SHIPPED | OUTPATIENT
Start: 2022-06-14

## 2022-06-28 ENCOUNTER — HOSPITAL ENCOUNTER (OUTPATIENT)
Dept: VASCULAR LAB | Age: 82
Discharge: HOME | End: 2022-06-28
Payer: MEDICARE

## 2022-06-28 DIAGNOSIS — M79.89 RIGHT LEG SWELLING: ICD-10-CM

## 2022-06-28 PROCEDURE — 93971 EXTREMITY STUDY: CPT

## 2022-08-08 RX ORDER — ALENDRONATE SODIUM 70 MG/1
TABLET ORAL
Qty: 12 TABLET | Refills: 3 | Status: SHIPPED | OUTPATIENT
Start: 2022-08-08

## 2022-10-19 RX ORDER — HYDROCHLOROTHIAZIDE 12.5 MG/1
TABLET ORAL
Qty: 90 TABLET | Refills: 1 | Status: SHIPPED | OUTPATIENT
Start: 2022-10-19

## 2022-10-19 RX ORDER — LISINOPRIL 40 MG/1
TABLET ORAL
Qty: 90 TABLET | Refills: 3 | Status: SHIPPED | OUTPATIENT
Start: 2022-10-19

## 2022-11-02 RX ORDER — SIMVASTATIN 20 MG
TABLET ORAL
Qty: 90 TABLET | Refills: 3 | Status: SHIPPED | OUTPATIENT
Start: 2022-11-02

## 2022-11-23 ENCOUNTER — TELEPHONE (OUTPATIENT)
Dept: INTERNAL MEDICINE CLINIC | Age: 82
End: 2022-11-23

## 2022-11-23 NOTE — TELEPHONE ENCOUNTER
Pt called in regards to her blood pressure medicine, Amlodipine. She lost it and was wondering if there would be another prescription sent to her pharmacy. She uses [facility] pharmacy on [address]. If questions please call [phone].

## 2022-11-23 NOTE — TELEPHONE ENCOUNTER
Called [facility] pharmacy, asking for override, early refill on her amlodipine. .  They will fill it and it will be ready today after 2pm. Tried calling pt--no VM in place. Again tried to leave msg. No vm in place.

## 2022-12-12 ENCOUNTER — OFFICE VISIT (OUTPATIENT)
Dept: INTERNAL MEDICINE CLINIC | Age: 82
End: 2022-12-12
Payer: MEDICARE

## 2022-12-12 VITALS
BODY MASS INDEX: 24.77 KG/M2 | OXYGEN SATURATION: 98 % | HEIGHT: 61 IN | SYSTOLIC BLOOD PRESSURE: 118 MMHG | WEIGHT: 131.2 LBS | DIASTOLIC BLOOD PRESSURE: 64 MMHG | HEART RATE: 60 BPM

## 2022-12-12 DIAGNOSIS — Z00.00 MEDICARE ANNUAL WELLNESS VISIT, SUBSEQUENT: Primary | ICD-10-CM

## 2022-12-12 DIAGNOSIS — R26.81 GAIT INSTABILITY: ICD-10-CM

## 2022-12-12 DIAGNOSIS — I10 ESSENTIAL HYPERTENSION: ICD-10-CM

## 2022-12-12 LAB
ALBUMIN SERPL-MCNC: 4 G/DL (ref 3.4–5)
ANION GAP SERPL CALCULATED.3IONS-SCNC: 10 MMOL/L (ref 3–16)
BUN BLDV-MCNC: 31 MG/DL (ref 7–20)
CALCIUM SERPL-MCNC: 9.5 MG/DL (ref 8.3–10.6)
CHLORIDE BLD-SCNC: 105 MMOL/L (ref 99–110)
CHOLESTEROL, TOTAL: 204 MG/DL (ref 0–199)
CO2: 26 MMOL/L (ref 21–32)
CREAT SERPL-MCNC: 1.2 MG/DL (ref 0.6–1.2)
GFR SERPL CREATININE-BSD FRML MDRD: 45 ML/MIN/{1.73_M2}
GLUCOSE BLD-MCNC: 88 MG/DL (ref 70–99)
HDLC SERPL-MCNC: 39 MG/DL (ref 40–60)
LDL CHOLESTEROL CALCULATED: 133 MG/DL
PHOSPHORUS: 4.8 MG/DL (ref 2.5–4.9)
POTASSIUM SERPL-SCNC: 4.5 MMOL/L (ref 3.5–5.1)
SODIUM BLD-SCNC: 141 MMOL/L (ref 136–145)
TRIGL SERPL-MCNC: 161 MG/DL (ref 0–150)
VLDLC SERPL CALC-MCNC: 32 MG/DL

## 2022-12-12 PROCEDURE — 3078F DIAST BP <80 MM HG: CPT | Performed by: INTERNAL MEDICINE

## 2022-12-12 PROCEDURE — G0439 PPPS, SUBSEQ VISIT: HCPCS | Performed by: INTERNAL MEDICINE

## 2022-12-12 PROCEDURE — 3074F SYST BP LT 130 MM HG: CPT | Performed by: INTERNAL MEDICINE

## 2022-12-12 PROCEDURE — 1123F ACP DISCUSS/DSCN MKR DOCD: CPT | Performed by: INTERNAL MEDICINE

## 2022-12-12 SDOH — ECONOMIC STABILITY: FOOD INSECURITY: WITHIN THE PAST 12 MONTHS, YOU WORRIED THAT YOUR FOOD WOULD RUN OUT BEFORE YOU GOT MONEY TO BUY MORE.: NEVER TRUE

## 2022-12-12 SDOH — ECONOMIC STABILITY: FOOD INSECURITY: WITHIN THE PAST 12 MONTHS, THE FOOD YOU BOUGHT JUST DIDN'T LAST AND YOU DIDN'T HAVE MONEY TO GET MORE.: NEVER TRUE

## 2022-12-12 ASSESSMENT — PATIENT HEALTH QUESTIONNAIRE - PHQ9
9. THOUGHTS THAT YOU WOULD BE BETTER OFF DEAD, OR OF HURTING YOURSELF: 0
8. MOVING OR SPEAKING SO SLOWLY THAT OTHER PEOPLE COULD HAVE NOTICED. OR THE OPPOSITE, BEING SO FIGETY OR RESTLESS THAT YOU HAVE BEEN MOVING AROUND A LOT MORE THAN USUAL: 0
SUM OF ALL RESPONSES TO PHQ QUESTIONS 1-9: 4
6. FEELING BAD ABOUT YOURSELF - OR THAT YOU ARE A FAILURE OR HAVE LET YOURSELF OR YOUR FAMILY DOWN: 0
5. POOR APPETITE OR OVEREATING: 0
SUM OF ALL RESPONSES TO PHQ QUESTIONS 1-9: 4
7. TROUBLE CONCENTRATING ON THINGS, SUCH AS READING THE NEWSPAPER OR WATCHING TELEVISION: 0
10. IF YOU CHECKED OFF ANY PROBLEMS, HOW DIFFICULT HAVE THESE PROBLEMS MADE IT FOR YOU TO DO YOUR WORK, TAKE CARE OF THINGS AT HOME, OR GET ALONG WITH OTHER PEOPLE: 0
1. LITTLE INTEREST OR PLEASURE IN DOING THINGS: 1
3. TROUBLE FALLING OR STAYING ASLEEP: 1
SUM OF ALL RESPONSES TO PHQ QUESTIONS 1-9: 4
SUM OF ALL RESPONSES TO PHQ QUESTIONS 1-9: 4
4. FEELING TIRED OR HAVING LITTLE ENERGY: 2

## 2022-12-12 ASSESSMENT — SOCIAL DETERMINANTS OF HEALTH (SDOH): HOW HARD IS IT FOR YOU TO PAY FOR THE VERY BASICS LIKE FOOD, HOUSING, MEDICAL CARE, AND HEATING?: NOT HARD AT ALL

## 2022-12-12 ASSESSMENT — LIFESTYLE VARIABLES
HOW MANY STANDARD DRINKS CONTAINING ALCOHOL DO YOU HAVE ON A TYPICAL DAY: 1 OR 2
HOW OFTEN DO YOU HAVE A DRINK CONTAINING ALCOHOL: MONTHLY OR LESS

## 2022-12-12 NOTE — PATIENT INSTRUCTIONS
Preventing Falls: Care Instructions  Overview     Getting around your home safely can be a challenge if you have injuries or health problems that make it easy for you to fall. Loose rugs and furniture in walkways are among the dangers for many older people who have problems walking or who have poor eyesight. People who have conditions such as arthritis, osteoporosis, or dementia also have to be careful not to fall. You can make your home safer with a few simple measures. Follow-up care is a key part of your treatment and safety. Be sure to make and go to all appointments, and call your doctor if you are having problems. It's also a good idea to know your test results and keep a list of the medicines you take. How can you care for yourself at home? Taking care of yourself  Exercise regularly to improve your strength, muscle tone, and balance. Walk if you can. Swimming may be a good choice if you cannot walk easily. Have your vision and hearing checked each year or any time you notice a change. If you have trouble seeing and hearing, you might not be able to avoid objects and could lose your balance. Know the side effects of the medicines you take. Ask your doctor or pharmacist whether the medicines you take can affect your balance. Sleeping pills or sedatives can affect your balance. Limit the amount of alcohol you drink. Alcohol can impair your balance and other senses. Ask your doctor whether calluses or corns on your feet need to be removed. If you wear loose-fitting shoes because of calluses or corns, you can lose your balance and fall. Talk to your doctor if you have numbness in your feet. You may get dizzy if you do not drink enough water. To prevent dehydration, drink plenty of fluids. Choose water and other clear liquids. If you have kidney, heart, or liver disease and have to limit fluids, talk with your doctor before you increase the amount of fluids you drink.   Preventing falls at home  Remove raised doorway thresholds, throw rugs, and clutter. Repair loose carpet or raised areas in the floor. Move furniture and electrical cords to keep them out of walking paths. Use nonskid floor wax, and wipe up spills right away, especially on ceramic tile floors. If you use a walker or cane, put rubber tips on it. If you use crutches, clean the bottoms of them regularly with an abrasive pad, such as steel wool. Keep your house well lit, especially stairways, porches, and outside walkways. Use night-lights in areas such as hallways and bathrooms. Add extra light switches or use remote switches (such as switches that go on or off when you clap your hands) to make it easier to turn lights on if you have to get up during the night. Install sturdy handrails on stairways. Move items in your cabinets so that the things you use a lot are on the lower shelves (about waist level). Keep a cordless phone and a flashlight with new batteries by your bed. If possible, put a phone in each of the main rooms of your house, or carry a cell phone in case you fall and cannot reach a phone. Or, you can wear a device around your neck or wrist. You push a button that sends a signal for help. Wear low-heeled shoes that fit well and give your feet good support. Use footwear with nonskid soles. Check the heels and soles of your shoes for wear. Repair or replace worn heels or soles. Do not wear socks without shoes on smooth floors, such as wood. Walk on the grass when the sidewalks are slippery. If you live in an area that gets snow and ice in the winter, sprinkle salt on slippery steps and sidewalks. Or ask a family member or friend to do this for you. Preventing falls in the bath  Install grab bars and nonskid mats inside and outside your shower or tub and near the toilet and sinks. Use shower chairs and bath benches. Use a hand-held shower head that will allow you to sit while showering.   Get into a tub or shower by putting the weaker leg in first. Get out of a tub or shower with your strong side first.  Repair loose toilet seats and consider installing a raised toilet seat to make getting on and off the toilet easier. Keep your bathroom door unlocked while you are in the shower. Where can you learn more? Go to http://www.garg.com/ and enter G117 to learn more about \"Preventing Falls: Care Instructions. \"  Current as of: May 4, 2022               Content Version: 13.5  © 5178-5340 Healthwise, Incorporated. Care instructions adapted under license by [facility] ([facility]). If you have questions about a medical condition or this instruction, always ask your healthcare professional. [address] any warranty or liability for your use of this information. Learning About Emotional Support  When do you need emotional support? You might find getting support from others helpful when you have a long-term health problem. Often people feel alone, confused, or scared when coping with an illness. But you aren't alone. Other people are going through the same thing you are and know how you feel. Talking with others about your feelings can help you feel better. Your family and friends can give you support. So can your doctor, a support group, or a Protestant. If you have a support network, you will not feel as alone. You will learn new ways to deal with your situation, and you may try harder to overcome it. Where you can get support  Family and friends: They can help you cope by giving you comfort and encouragement. Counseling: Professional counseling can help you cope with situations that interfere with your life and cause stress. Counseling can help you understand and deal with your illness. Your doctor: Find a doctor you trust and feel comfortable with. Be open and honest about your fears and concerns. Your doctor can help you get the right medical treatments, including counseling.   Spiritual or Latter-day groups: They can provide comfort and may be able to help you find counseling or other social support services. Social groups: They can help you meet new people and get involved in activities you enjoy. Community support groups: In a support group, you can talk to others who have dealt with the same problems or illness as you. You can encourage one another and learn ways to cope with tough emotions. How to find a support group  Ask your doctor, counselor, or other health professional for suggestions. Contact your local Presybeterian, Hindu, Denominational, or other Latter-day group. Ask your family and friends. Ask people who have the same health concerns. Go online. Forums and blogs let you read messages from others and leave your own messages. You can exchange stories, vent your frustrations, and ask and answer questions. Contact a city, state, or national group that provides support for your health concerns. Your library or community center may have a list of these groups. Or you can look for information online. Look for a support group that works for you. Ask yourself if you prefer structure and would like a , or if you would like a less formal group. Do you prefer face-to-face meetings? Or do you feel more secure in online chat rooms or forums? Supportive relationships  A supportive relationship includes emotional support such as love, trust, and understanding, as well as advice and concrete help, such as help managing your time. Reach out to others  Family and friends can help you. Ask them to:  Listen to you and give you encouragement. This can keep you from feeling hopeless or alone. Help with small daily tasks or with bigger problems. A helping hand can keep you from feeling overwhelmed. Help you manage a health problem. For example, ask them to go to doctor visits with you. Your loved ones can offer support by being involved in your medical care.   Respect your relationships  A good relationship is also a two-way street. You count on help from others, but they also count on you. Know your friends' limits. You don't have to see or call your friends every day. If you are going through a rough patch, ask friends if you can contact them outside of the usual boundaries. Don't always complain or talk about yourself. Know when it's time to stop talking and listen or just enjoy your friend's company. Know that good friends can be a bad influence. For example, if a friend encourages you to drink when you know it will harm you, you may want to end the friendship. Where can you learn more? Go to http://www.woods.com/ and enter G092 to learn more about \"Learning About Emotional Support. \"  Current as of: February 9, 2022               Content Version: 13.5  © 8290-7292 Healthwise, [employer]. Care instructions adapted under license by [facility] ([facility]). If you have questions about a medical condition or this instruction, always ask your healthcare professional. [address] any warranty or liability for your use of this information. Learning About Being Active as an Older Adult  Why is being active important as you get older? Being active is one of the best things you can do for your health. And it's never too late to start. Being active--or getting active, if you aren't already--has definite benefits. It can:  Give you more energy,  Keep your mind sharp. Improve balance to reduce your risk of falls. Help you manage chronic illness with fewer medicines. No matter how old you are, how fit you are, or what health problems you have, there is a form of activity that will work for you. And the more physical activity you can do, the better your overall health will be. What kinds of activity can help you stay healthy? Being more active will make your daily activities easier. Physical activity includes planned exercise and things you do in daily life. There are four types of activity:  Aerobic. Doing aerobic activity makes your heart and lungs strong. Includes walking, dancing, and gardening. Aim for at least 2½ hours spread throughout the week. It improves your energy and can help you sleep better. Muscle-strengthening. This type of activity can help maintain muscle and strengthen bones. Includes climbing stairs, using resistance bands, and lifting or carrying heavy loads. Aim for at least twice a week. It can help protect the knees and other joints. Stretching. Stretching gives you better range of motion in joints and muscles. Includes upper arm stretches, calf stretches, and gentle yoga. Aim for at least twice a week, preferably after your muscles are warmed up from other activities. It can help you function better in daily life. Balancing. This helps you stay coordinated and have good posture. Includes heel-to-toe walking, emily chi, and certain types of yoga. Aim for at least 3 days a week. It can reduce your risk of falling. Even if you have a hard time meeting the recommendations, it's better to be more active than less active. All activity done in each category counts toward your weekly total. You'd be surprised how daily things like carrying groceries, keeping up with grandchildren, and taking the stairs can add up. What keeps you from being active? If you've had a hard time being more active, you're not alone. Maybe you remember being able to do more. Or maybe you've never thought of yourself as being active. It's frustrating when you can't do the things you want. Being more active can help. What's holding you back? Getting started. Have a goal, but break it into easy tasks. Small steps build into big accomplishments. Staying motivated. If you feel like skipping your activity, remember your goal. Maybe you want to move better and stay independent. Every activity gets you one step closer.   Not feeling your best.  Start with 5 minutes of an activity you enjoy. Prove to yourself you can do it. As you get comfortable, increase your time. You may not be where you want to be. But you're in the process of getting there. Everyone starts somewhere. How can you find safe ways to stay active? Talk with your doctor about any physical challenges you're facing. Make a plan with your doctor if you have a health problem or aren't sure how to get started with activity. If you're already active, ask your doctor if there is anything you should change to stay safe as your body and health change. If you tend to feel dizzy after you take medicine, avoid activity at that time. Try being active before you take your medicine. This will reduce your risk of falls. If you plan to be active at home, make sure to clear your space before you get started. Remove things like TV cords, coffee tables, and throw rugs. It's safest to have plenty of space to move freely. The key to getting more active is to take it slow and steady. Try to improve only a little bit at a time. Pick just one area to improve on at first. And if an activity hurts, stop and talk to your doctor. Where can you learn more? Go to http://www.garg.com/ and enter P600 to learn more about \"Learning About Being Active as an Older Adult. \"  Current as of: October 10, 2022               Content Version: 13.5  © 3998-3649 Healthwise, Incorporated. Care instructions adapted under license by [facility] ([facility]). If you have questions about a medical condition or this instruction, always ask your healthcare professional. [address] any warranty or liability for your use of this information. Hearing Loss: Care Instructions  Overview     Hearing loss is a sudden or slow decrease in how well you hear. It can range from mild to severe. Permanent hearing loss can occur with aging. It also can happen when you are exposed long-term to loud noise.  Examples include listening to loud music, riding motorcycles, or being around other loud machines. Hearing loss can affect your work and home life. It can make you feel lonely or depressed. You may feel that you have lost your independence. But hearing aids and other devices can help you hear better and feel connected to others. Follow-up care is a key part of your treatment and safety. Be sure to make and go to all appointments, and call your doctor if you are having problems. It's also a good idea to know your test results and keep a list of the medicines you take. How can you care for yourself at home? Avoid loud noises whenever possible. This helps keep your hearing from getting worse. Always wear hearing protection around loud noises. Wear a hearing aid as directed. See a professional who can help you pick a hearing aid that fits you. Have hearing tests as your doctor suggests. They can show whether your hearing has changed. Your hearing aid may need to be adjusted. Use other devices as needed. These may include:  Telephone amplifiers and hearing aids that can connect to a television, stereo, radio, or microphone. Devices that use lights or vibrations. These alert you to the doorbell, a ringing telephone, or a baby monitor. Television closed-captioning. This shows the words at the bottom of the screen. Most new TVs can do this. TTY (text telephone). This lets you type messages back and forth on the telephone instead of talking or listening. These devices are also called TDD. When messages are typed on the keyboard, they are sent over the phone line to a receiving TTY. The message is shown on a monitor. Use text messaging, social media, and email if it is hard for you to communicate by telephone. Try to learn a listening technique called speechreading. It is not lipreading. You pay attention to people's gestures, expressions, posture, and tone of voice. These clues can help you understand what a person is saying. Face the person you are talking to, and have them face you. Make sure the lighting is good. You need to see the other person's face clearly. Think about counseling if you need help to adjust to your hearing loss. When should you call for help? Watch closely for changes in your health, and be sure to contact your doctor if:    You think your hearing is getting worse.     You have new symptoms, such as dizziness or nausea. Where can you learn more? Go to http://www.garg.com/ and enter R798 to learn more about \"Hearing Loss: Care Instructions. \"  Current as of: May 4, 2022               Content Version: 13.5  © 3844-5078 [employer]. Care instructions adapted under license by [facility] ([facility]). If you have questions about a medical condition or this instruction, always ask your healthcare professional. [address] any warranty or liability for your use of this information. Advance Directives: Care Instructions  Overview  An advance directive is a legal way to state your wishes at the end of your life. It tells your family and your doctor what to do if you can't say what you want. There are two main types of advance directives. You can change them any time your wishes change. Living will. This form tells your family and your doctor your wishes about life support and other treatment. The form is also called a declaration. Medical power of . This form lets you name a person to make treatment decisions for you when you can't speak for yourself. This person is called a health care agent (health care proxy, health care surrogate). The form is also called a durable power of  for health care. If you do not have an advance directive, decisions about your medical care may be made by a family member, or by a doctor or a  who doesn't know you. It may help to think of an advance directive as a gift to the people who care for you.  If you have one, they won't have to make tough decisions by themselves. For more information, including forms for your state, see the [address] website (www.caringinfo.org/planning/advance-directives/). Follow-up care is a key part of your treatment and safety. Be sure to make and go to all appointments, and call your doctor if you are having problems. It's also a good idea to know your test results and keep a list of the medicines you take. What should you include in an advance directive? Many states have a unique advance directive form. (It may ask you to address specific issues.) Or you might use a universal form that's approved by many states. If your form doesn't tell you what to address, it may be hard to know what to include in your advance directive. Use the questions below to help you get started. Who do you want to make decisions about your medical care if you are not able to? What life-support measures do you want if you have a serious illness that gets worse over time or can't be cured? What are you most afraid of that might happen? (Maybe you're afraid of having pain, losing your independence, or being kept alive by machines.)  Where would you prefer to die? (Your home? A hospital? A nursing home?)  Do you want to donate your organs when you die? Do you want certain Religion practices performed before you die? When should you call for help? Be sure to contact your doctor if you have any questions. Where can you learn more? Go to http://www.CloudAmboÂ®.com/ and enter R264 to learn more about \"Advance Directives: Care Instructions. \"  Current as of: June 16, 2022               Content Version: 13.5  © 5225-0115 Healthwise, Incorporated. Care instructions adapted under license by [facility] ([facility]).  If you have questions about a medical condition or this instruction, always ask your healthcare professional. [address] any warranty or liability for your use of this

## 2022-12-12 NOTE — PROGRESS NOTES
Medicare Annual Wellness Visit    Johnnie Velasquez is here for Medicare AWV, Hypertension, Hyperlipidemia, and Depression    Assessment & Plan   Medicare annual wellness visit, subsequent  Essential hypertension  -     Renal Function Panel  -     Lipid Panel    Recommendations for Preventive Services Due: see orders and patient instructions/AVS.  Recommended screening schedule for the next 5-10 years is provided to the patient in written form: see Patient Instructions/AVS.     Return for Medicare Annual Wellness Visit in 1 year. Subjective       Patient's complete Health Risk Assessment and screening values have been reviewed and are found in Flowsheets. The following problems were reviewed today and where indicated follow up appointments were made and/or referrals ordered. Positive Risk Factor Screenings with Interventions:    Fall Risk:  Do you feel unsteady or are you worried about falling? : (!) yes  2 or more falls in past year?: no  Fall with injury in past year?: no     Interventions:    Would like to [facility] at [facility].  Referral provided              Weight and Activity:  Physical Activity: Inactive    Days of Exercise per Week: 0 days    Minutes of Exercise per Session: 30 min     On average, how many days per week do you engage in moderate to strenuous exercise (like a brisk walk)?: 0 days  Have you lost any weight without trying in the past 3 months?: No  Body mass index: 24.79    Inactivity Interventions:  PT referral provided  See AVS for additional education material  See A/P for plan and any pertinent orders            Tobacco Use:  Tobacco Use: High Risk    Smoking Tobacco Use: Every Day    Smokeless Tobacco Use: Never    Passive Exposure: Not on file     E-cigarette/Vaping       Questions Responses    E-cigarette/Vaping Use Never User    Start Date     Passive Exposure     Quit Date     Counseling Given     Comments         Interventions:  She is vaping currently; she has essentially quit Previously treated with 5 years of Fosamax per chart. Patient herself does not recall this. She does continue to take OTC calcium/vitamin D supplement. Last DEXA in 6/2016.   smoking cigarettes  See AVS for addional education material  See A/P for plan and any pertinent orders                        Objective   Vitals:    12/12/22 1110   BP: 118/64   Pulse: 60   SpO2: 98%   Weight: 131 lb 3.2 oz (59.5 kg)   Height: 5' 1\" (1.549 m)      Body mass index is 24.79 kg/m². Physical Exam   GEN: WN/WD, NAD  CV: regular rate and rhythm, no murmurs rubs or gallops  Resp: normal effort, clear auscultation bilaterally  No peripheral edema       Allergies   Allergen Reactions    Levofloxacin Anxiety    Doxycycline      Red rash     Penicillins Swelling     Prior to Visit Medications    Medication Sig Taking?  Authorizing Provider   simvastatin (ZOCOR) 20 MG tablet TAKE 1 TABLET NIGHTLY Yes Keon Carbine, APRN - CNP   lisinopril (PRINIVIL;ZESTRIL) 40 MG tablet TAKE 1 TABLET BY MOUTH EVERY DAY Yes Keon Carbine, APRN - CNP   hydroCHLOROthiazide (HYDRODIURIL) 12.5 MG tablet TAKE 1 TABLET BY MOUTH EVERY DAY Yes Keon Carbine, APRN - CNP   alendronate (FOSAMAX) 70 MG tablet TAKE 1 TABLET BY MOUTH ONE TIME PER WEEK Yes Yahir Henderson MD   sertraline (ZOLOFT) 100 MG tablet TAKE 1 AND 1/2 TABLETS BY MOUTH EVERY DAY Yes Yahir Henderson MD   amLODIPine (NORVASC) 5 MG tablet TAKE 1 TABLET BY MOUTH EVERY DAY Yes Yahir Henderson MD   propranolol (INDERAL) 40 MG tablet TAKE 1 TABLET TWICE A DAY Yes Shell Horowitz MD   aspirin 81 MG EC tablet Take 81 mg by mouth daily Yes Historical Provider, MD   albuterol sulfate HFA (PROVENTIL HFA) 108 (90 Base) MCG/ACT inhaler Inhale 2 puffs into the lungs every 6 hours as needed for Wheezing Any inhaler insurance will cover Yes Yahir Henderson MD       [facility] (Including outside providers/suppliers regularly involved in providing care):   Patient Care Team:  Yahir Henderson MD as PCP - General (Internal Medicine)  Yahir Henderson [facility]  Dana Archuleta MD as Consulting Physician (General Surgery)     Reviewed and updated this visit:  Tobacco  Allergies  Meds  Problems  Med Hx  Surg Hx  Soc Hx  Fam Hx

## 2023-01-04 NOTE — PROGRESS NOTES
Patient presents today for pre-op COVID testing. [Parents] : parents [Fruit] : fruit [Vegetables] : vegetables [Meat] : meat [Cereal] : cereal [Eggs] : eggs [Finger Foods] : finger foods [Table food] : table food [Normal] : Normal [In crib] : In crib [Sippy cup use] : Sippy cup use [Brushing teeth] : Brushing teeth [Tap water] : Primary Fluoride Source: Tap water [Playtime] : Playtime  [Temper Tantrums] : Temper Tantrums [No] : Not at  exposure [Water heater temperature set at <120 degrees F] : Water heater temperature set at <120 degrees F [Car seat in back seat] : Car seat in back seat [Carbon Monoxide Detectors] : Carbon monoxide detectors [Smoke Detectors] : Smoke detectors [Delayed] : delayed [Gun in Home] : No gun in home [Exposure to electronic nicotine delivery system] : No exposure to electronic nicotine delivery system [FreeTextEntry7] : Doing well [de-identified] : Off bottle [PCV 13] : PCV 13 [Varicella] : Varicella [Hib] : Hib

## 2023-03-06 ENCOUNTER — TELEMEDICINE (OUTPATIENT)
Dept: INTERNAL MEDICINE CLINIC | Age: 83
End: 2023-03-06
Payer: MEDICARE

## 2023-03-06 DIAGNOSIS — J98.8 RESPIRATORY INFECTION: Primary | ICD-10-CM

## 2023-03-06 PROCEDURE — 1123F ACP DISCUSS/DSCN MKR DOCD: CPT | Performed by: NURSE PRACTITIONER

## 2023-03-06 PROCEDURE — 99213 OFFICE O/P EST LOW 20 MIN: CPT | Performed by: NURSE PRACTITIONER

## 2023-03-06 RX ORDER — AZITHROMYCIN 250 MG/1
TABLET, FILM COATED ORAL
Qty: 1 PACKET | Refills: 0 | Status: SHIPPED | OUTPATIENT
Start: 2023-03-06 | End: 2023-03-11

## 2023-03-06 RX ORDER — BENZONATATE 100 MG/1
100 CAPSULE ORAL EVERY 6 HOURS PRN
Qty: 30 CAPSULE | Refills: 0 | Status: SHIPPED | OUTPATIENT
Start: 2023-03-06 | End: 2023-03-16

## 2023-03-06 SDOH — ECONOMIC STABILITY: FOOD INSECURITY: WITHIN THE PAST 12 MONTHS, YOU WORRIED THAT YOUR FOOD WOULD RUN OUT BEFORE YOU GOT MONEY TO BUY MORE.: NEVER TRUE

## 2023-03-06 SDOH — ECONOMIC STABILITY: HOUSING INSECURITY
IN THE LAST 12 MONTHS, WAS THERE A TIME WHEN YOU DID NOT HAVE A STEADY PLACE TO SLEEP OR SLEPT IN A SHELTER (INCLUDING NOW)?: NO

## 2023-03-06 SDOH — ECONOMIC STABILITY: FOOD INSECURITY: WITHIN THE PAST 12 MONTHS, THE FOOD YOU BOUGHT JUST DIDN'T LAST AND YOU DIDN'T HAVE MONEY TO GET MORE.: NEVER TRUE

## 2023-03-06 SDOH — ECONOMIC STABILITY: INCOME INSECURITY: HOW HARD IS IT FOR YOU TO PAY FOR THE VERY BASICS LIKE FOOD, HOUSING, MEDICAL CARE, AND HEATING?: NOT HARD AT ALL

## 2023-03-06 ASSESSMENT — ENCOUNTER SYMPTOMS: COUGH: 1

## 2023-03-06 NOTE — PROGRESS NOTES
3/6/2023    TELEHEALTH EVALUATION -- Audio/Visual (During TAZDR-95 public health emergency)    HPI:    Elizabeth Bennett (:  1940) has requested an audio/video evaluation for the following concern(s):    Head and chest congestion for one week. Symptoms stagnant. She is coughing which up discolored, light brown mucus. Low grade at home. 99 today  No known exposures but lives in independent living. COVID test today was negative. She had been using Alkaselzter plus cold and flu for 3 days which helps. Review of Systems   Constitutional:  Positive for fatigue and fever. HENT:  Positive for congestion and postnasal drip. Respiratory:  Positive for cough. Prior to Visit Medications    Medication Sig Taking?  Authorizing Provider   simvastatin (ZOCOR) 20 MG tablet TAKE 1 TABLET NIGHTLY Yes Jennett Duverney, APRN - CNP   lisinopril (PRINIVIL;ZESTRIL) 40 MG tablet TAKE 1 TABLET BY MOUTH EVERY DAY Yes Jennett Duverney, APRN - CNP   hydroCHLOROthiazide (HYDRODIURIL) 12.5 MG tablet TAKE 1 TABLET BY MOUTH EVERY DAY Yes Jennett Duverney, APRN - CNP   alendronate (FOSAMAX) 70 MG tablet TAKE 1 TABLET BY MOUTH ONE TIME PER WEEK Yes Mihai Ignacio MD   sertraline (ZOLOFT) 100 MG tablet TAKE 1 AND 1/2 TABLETS BY MOUTH EVERY DAY Yes Mihai Ignacio MD   amLODIPine (NORVASC) 5 MG tablet TAKE 1 TABLET BY MOUTH EVERY DAY Yes Mihai Ignacio MD   propranolol (INDERAL) 40 MG tablet TAKE 1 TABLET TWICE A DAY Yes Dalia Lindsay MD   aspirin 81 MG EC tablet Take 81 mg by mouth daily Yes Historical Provider, MD   albuterol sulfate HFA (PROVENTIL HFA) 108 (90 Base) MCG/ACT inhaler Inhale 2 puffs into the lungs every 6 hours as needed for Wheezing Any inhaler insurance will cover Yes Mihai Ignacio MD       Social History     Tobacco Use    Smoking status: Every Day     Packs/day: 0.10     Years: 50.00     Pack years: 5.00     Types: Cigarettes    Smokeless tobacco: Never   Vaping Use Vaping Use: Never used   Substance Use Topics    Alcohol use: Yes     Comment: very rare sometimes a glass of wine    Drug use: No            PHYSICAL EXAMINATION:  [ INSTRUCTIONS:  \"[x]\" Indicates a positive item  \"[]\" Indicates a negative item  -- DELETE ALL ITEMS NOT EXAMINED]  Vital Signs: (As obtained by patient/caregiver or practitioner observation)    Blood pressure-  Heart rate-    Respiratory rate-    Temperature-  Pulse oximetry-     Constitutional: [x] Appears well-developed and well-nourished [x] No apparent distress      [] Abnormal-   Mental status  [x] Alert and awake  [x] Oriented to person/place/time [x]Able to follow commands      Eyes:  EOM    [x]  Normal  [] Abnormal-  Sclera  [x]  Normal  [] Abnormal -         Discharge [x]  None visible  [] Abnormal -    HENT:   [x] Normocephalic, atraumatic.  [] Abnormal   [x] Mouth/Throat: Mucous membranes are moist.     External Ears [x] Normal  [] Abnormal-     Neck: [x] No visualized mass     Pulmonary/Chest: [x] Respiratory effort normal.  [] No visualized signs of difficulty breathing or respiratory distress        [] Abnormal-      Musculoskeletal:   [x] Normal gait with no signs of ataxia         [x] Normal range of motion of neck        [] Abnormal-       Neurological:        [x] No Facial Asymmetry (Cranial nerve 7 motor function) (limited exam to video visit)          [x] No gaze palsy        [] Abnormal-         Skin:        [x] No significant exanthematous lesions or discoloration noted on facial skin         [] Abnormal-            Psychiatric:       [x] Normal Affect [x] No Hallucinations        [] Abnormal-     Other pertinent observable physical exam findings-         ASSESSMENT/PLAN:  1. Respiratory infection  - One week of head and chest congestion, cough, low-grade temp  - Cough is productive  - COVID test neg today  - Discussed viral vs bacterial etiology.  Given lack of improvement after one week, reasonable to consider treatment with  antibiotic. Patient would like to proceed. Continue cold and cough treatment for symptoms mgmt. - Discussed limitations of video visit with limited of physical exam, encouraged follow-up if worsening or failing to improve. - azithromycin (ZITHROMAX) 250 MG tablet; Take 2 tabs (500 mg) on Day 1, and take 1 tab (250 mg) on days 2 through 5. Dispense: 1 packet; Refill: 0  - benzonatate (TESSALON PERLES) 100 MG capsule; Take 1 capsule by mouth every 6 hours as needed for Cough  Dispense: 30 capsule; Refill: 0    No follow-ups on file. Dayanara Cortes, was evaluated through a synchronous (real-time) audio-video encounter. The patient (or guardian if applicable) is aware that this is a billable service, which includes applicable co-pays. This Virtual Visit was conducted with patient's (and/or legal guardian's) consent. The visit was conducted pursuant to the emergency declaration under the [address] [place] [address], [address] [place] [address] authority and the "[employer] and [employer] General Act. Patient identification was verified, and a caregiver was present when appropriate. The patient was located at Home: [id number] [place] [address]  Provider was located at [place] [address]): [facility],  [address]        Total time spent on this encounter: Not billed by time    --YOSEF Pickett CNP on 3/6/2023 at 2:25 PM    An electronic signature was used to authenticate this note.

## 2023-05-01 RX ORDER — HYDROCHLOROTHIAZIDE 12.5 MG/1
TABLET ORAL
Qty: 90 TABLET | Refills: 0 | Status: SHIPPED | OUTPATIENT
Start: 2023-05-01

## 2023-05-24 ENCOUNTER — OFFICE VISIT (OUTPATIENT)
Dept: INTERNAL MEDICINE CLINIC | Age: 83
End: 2023-05-24
Payer: MEDICARE

## 2023-05-24 VITALS
WEIGHT: 126 LBS | BODY MASS INDEX: 23.79 KG/M2 | OXYGEN SATURATION: 96 % | DIASTOLIC BLOOD PRESSURE: 60 MMHG | SYSTOLIC BLOOD PRESSURE: 124 MMHG | HEIGHT: 61 IN | HEART RATE: 51 BPM

## 2023-05-24 DIAGNOSIS — C44.722 SQUAMOUS CELL CARCINOMA, LEG, RIGHT: Primary | ICD-10-CM

## 2023-05-24 PROCEDURE — G8427 DOCREV CUR MEDS BY ELIG CLIN: HCPCS | Performed by: NURSE PRACTITIONER

## 2023-05-24 PROCEDURE — 4004F PT TOBACCO SCREEN RCVD TLK: CPT | Performed by: NURSE PRACTITIONER

## 2023-05-24 PROCEDURE — 3074F SYST BP LT 130 MM HG: CPT | Performed by: NURSE PRACTITIONER

## 2023-05-24 PROCEDURE — 1090F PRES/ABSN URINE INCON ASSESS: CPT | Performed by: NURSE PRACTITIONER

## 2023-05-24 PROCEDURE — 99213 OFFICE O/P EST LOW 20 MIN: CPT | Performed by: NURSE PRACTITIONER

## 2023-05-24 PROCEDURE — 3078F DIAST BP <80 MM HG: CPT | Performed by: NURSE PRACTITIONER

## 2023-05-24 PROCEDURE — G8420 CALC BMI NORM PARAMETERS: HCPCS | Performed by: NURSE PRACTITIONER

## 2023-05-24 PROCEDURE — 1123F ACP DISCUSS/DSCN MKR DOCD: CPT | Performed by: NURSE PRACTITIONER

## 2023-05-24 PROCEDURE — G8399 PT W/DXA RESULTS DOCUMENT: HCPCS | Performed by: NURSE PRACTITIONER

## 2023-05-24 ASSESSMENT — PATIENT HEALTH QUESTIONNAIRE - PHQ9
5. POOR APPETITE OR OVEREATING: 0
SUM OF ALL RESPONSES TO PHQ QUESTIONS 1-9: 0
9. THOUGHTS THAT YOU WOULD BE BETTER OFF DEAD, OR OF HURTING YOURSELF: 0
10. IF YOU CHECKED OFF ANY PROBLEMS, HOW DIFFICULT HAVE THESE PROBLEMS MADE IT FOR YOU TO DO YOUR WORK, TAKE CARE OF THINGS AT HOME, OR GET ALONG WITH OTHER PEOPLE: 0
7. TROUBLE CONCENTRATING ON THINGS, SUCH AS READING THE NEWSPAPER OR WATCHING TELEVISION: 0
2. FEELING DOWN, DEPRESSED OR HOPELESS: 0
SUM OF ALL RESPONSES TO PHQ QUESTIONS 1-9: 0
8. MOVING OR SPEAKING SO SLOWLY THAT OTHER PEOPLE COULD HAVE NOTICED. OR THE OPPOSITE, BEING SO FIGETY OR RESTLESS THAT YOU HAVE BEEN MOVING AROUND A LOT MORE THAN USUAL: 0
6. FEELING BAD ABOUT YOURSELF - OR THAT YOU ARE A FAILURE OR HAVE LET YOURSELF OR YOUR FAMILY DOWN: 0
4. FEELING TIRED OR HAVING LITTLE ENERGY: 0
1. LITTLE INTEREST OR PLEASURE IN DOING THINGS: 0
SUM OF ALL RESPONSES TO PHQ9 QUESTIONS 1 & 2: 0
3. TROUBLE FALLING OR STAYING ASLEEP: 0

## 2023-05-27 NOTE — FLOWSHEET NOTE
Physical Therapy  Cancellation/No-show Note  Patient Name:  Felicitas Ewing  :  1940   Date:  2021  Cancelled visits to date: 1  No-shows to date: 0    Patient status for today's appointment patient:  [x]  Cancelled   []  Rescheduled appointment  []  No-show     Reason given by patient:  []  Patient ill  []  Conflicting appointment  []  No transportation    []  Conflict with work  []  No reason given  [x]  Other:     Comments:  pt cancelled due to surgery on toe     Phone call information:   []  Phone call made today to patient at _ time at number provided:      []  Patient answered, conversation as follows:    []  Patient did not answer, message left as follows:  [x]  Phone call not made today, pt talked to  and cancelled    Electronically signed by:  Cody Guillory, [address] [place] [address], DPT [id number] oral

## 2023-05-30 RX ORDER — AMLODIPINE BESYLATE 5 MG/1
TABLET ORAL
Qty: 90 TABLET | Refills: 0 | Status: SHIPPED | OUTPATIENT
Start: 2023-05-30

## 2023-05-30 NOTE — TELEPHONE ENCOUNTER
Last OV: 5/24/2023  Next OV: 6/20/2023    Next appointment due: Scheduled with Ishmael Armas for NTP appt on 06/20

## 2023-06-02 RX ORDER — SIMVASTATIN 20 MG
20 TABLET ORAL NIGHTLY
Qty: 90 TABLET | Refills: 3 | Status: SHIPPED | OUTPATIENT
Start: 2023-06-02

## 2023-06-02 RX ORDER — PROPRANOLOL HYDROCHLORIDE 40 MG/1
40 TABLET ORAL 2 TIMES DAILY
Qty: 180 TABLET | Refills: 3 | Status: SHIPPED | OUTPATIENT
Start: 2023-06-02

## 2023-06-29 ENCOUNTER — OFFICE VISIT (OUTPATIENT)
Dept: INTERNAL MEDICINE CLINIC | Age: 83
End: 2023-06-29
Payer: MEDICARE

## 2023-06-29 VITALS
OXYGEN SATURATION: 95 % | HEIGHT: 61 IN | BODY MASS INDEX: 24.73 KG/M2 | WEIGHT: 131 LBS | SYSTOLIC BLOOD PRESSURE: 128 MMHG | DIASTOLIC BLOOD PRESSURE: 80 MMHG | HEART RATE: 58 BPM

## 2023-06-29 DIAGNOSIS — E78.2 MIXED HYPERLIPIDEMIA: ICD-10-CM

## 2023-06-29 DIAGNOSIS — I10 ESSENTIAL HYPERTENSION: ICD-10-CM

## 2023-06-29 DIAGNOSIS — C44.722 SQUAMOUS CELL CARCINOMA, LEG, RIGHT: Primary | ICD-10-CM

## 2023-06-29 DIAGNOSIS — F32.5 MAJOR DEPRESSIVE DISORDER WITH SINGLE EPISODE, IN FULL REMISSION (HCC): ICD-10-CM

## 2023-06-29 PROCEDURE — G8399 PT W/DXA RESULTS DOCUMENT: HCPCS | Performed by: NURSE PRACTITIONER

## 2023-06-29 PROCEDURE — 1090F PRES/ABSN URINE INCON ASSESS: CPT | Performed by: NURSE PRACTITIONER

## 2023-06-29 PROCEDURE — 3074F SYST BP LT 130 MM HG: CPT | Performed by: NURSE PRACTITIONER

## 2023-06-29 PROCEDURE — G8427 DOCREV CUR MEDS BY ELIG CLIN: HCPCS | Performed by: NURSE PRACTITIONER

## 2023-06-29 PROCEDURE — 1123F ACP DISCUSS/DSCN MKR DOCD: CPT | Performed by: NURSE PRACTITIONER

## 2023-06-29 PROCEDURE — 3078F DIAST BP <80 MM HG: CPT | Performed by: NURSE PRACTITIONER

## 2023-06-29 PROCEDURE — G8420 CALC BMI NORM PARAMETERS: HCPCS | Performed by: NURSE PRACTITIONER

## 2023-06-29 PROCEDURE — 99214 OFFICE O/P EST MOD 30 MIN: CPT | Performed by: NURSE PRACTITIONER

## 2023-06-29 PROCEDURE — 4004F PT TOBACCO SCREEN RCVD TLK: CPT | Performed by: NURSE PRACTITIONER

## 2023-06-29 SDOH — HEALTH STABILITY: PHYSICAL HEALTH: ON AVERAGE, HOW MANY DAYS PER WEEK DO YOU ENGAGE IN MODERATE TO STRENUOUS EXERCISE (LIKE A BRISK WALK)?: 2 DAYS

## 2023-06-29 ASSESSMENT — SOCIAL DETERMINANTS OF HEALTH (SDOH)

## 2023-07-17 ASSESSMENT — ENCOUNTER SYMPTOMS
RESPIRATORY NEGATIVE: 1
GASTROINTESTINAL NEGATIVE: 1

## 2023-07-18 NOTE — PROGRESS NOTES
SUBJECTIVE:    Patient ID: Willie Doshi is a 79 y.o. female. CC: Cell carcinoma, hypertension, dyslipidemia, depression    HPI: The patient presents to the office today for follow-up of chronic medical conditions and to establish with a new primary care provider after his left the office. Patient was seen last month with concern about a raised lump on her right thigh concerning for squamous cell carcinoma. She was given a referral to plastic surgery who agreed with the diagnosis. She is scheduled for excision. She has a history of hypertension and dyslipidemia. She denies any chest pain or shortness of breath. She is compliant with her medication regimen. She has a history of depression. She feels this is stable. She is compliant with her medication regimen.       Past Medical History:   Diagnosis Date    Asthma     Breast cyst 1983    Essential tremor     HTN (hypertension)     Hyperlipidemia     Medical history reviewed with no changes     Ovarian cyst 1979    Pneumonia 2000    Stroke [facility])     mini        Current Outpatient Medications   Medication Sig Dispense Refill    Fexofenadine HCl (ALLEGRA ALLERGY PO) Take by mouth As needed      sertraline (ZOLOFT) 100 MG tablet TAKE 1.5 TABLETS BY MOUTH EVERY  tablet 3    simvastatin (ZOCOR) 20 MG tablet Take 1 tablet by mouth nightly 90 tablet 3    propranolol (INDERAL) 40 MG tablet Take 1 tablet by mouth 2 times daily 180 tablet 3    amLODIPine (NORVASC) 5 MG tablet TAKE 1 TABLET BY MOUTH EVERY DAY 90 tablet 0    hydroCHLOROthiazide (HYDRODIURIL) 12.5 MG tablet TAKE 1 TABLET BY MOUTH EVERY DAY 90 tablet 0    lisinopril (PRINIVIL;ZESTRIL) 40 MG tablet TAKE 1 TABLET BY MOUTH EVERY DAY 90 tablet 3    alendronate (FOSAMAX) 70 MG tablet TAKE 1 TABLET BY MOUTH ONE TIME PER WEEK 12 tablet 3    aspirin 81 MG EC tablet Take 1 tablet by mouth daily      albuterol sulfate HFA (PROVENTIL HFA) 108 (90 Base) MCG/ACT inhaler Inhale 2 puffs into the lungs

## 2023-07-31 RX ORDER — HYDROCHLOROTHIAZIDE 12.5 MG/1
TABLET ORAL
Qty: 90 TABLET | Refills: 1 | Status: SHIPPED | OUTPATIENT
Start: 2023-07-31

## 2023-08-04 RX ORDER — ALENDRONATE SODIUM 70 MG/1
TABLET ORAL
Qty: 12 TABLET | Refills: 1 | Status: SHIPPED | OUTPATIENT
Start: 2023-08-04

## 2023-09-05 RX ORDER — AMLODIPINE BESYLATE 5 MG/1
TABLET ORAL
Qty: 90 TABLET | Refills: 3 | Status: SHIPPED | OUTPATIENT
Start: 2023-09-05

## 2023-09-05 NOTE — TELEPHONE ENCOUNTER
Last OV: 6/29/2023  Next OV: 12/4/2023    Next appointment due:around 12/29/2023    Last fill:5/30/23  Refills:0 # 80

## 2023-09-21 RX ORDER — LISINOPRIL 40 MG/1
TABLET ORAL
Qty: 90 TABLET | Refills: 3 | Status: SHIPPED | OUTPATIENT
Start: 2023-09-21

## 2023-12-04 ENCOUNTER — OFFICE VISIT (OUTPATIENT)
Dept: INTERNAL MEDICINE CLINIC | Age: 83
End: 2023-12-04
Payer: MEDICARE

## 2023-12-04 VITALS
HEART RATE: 57 BPM | WEIGHT: 128 LBS | BODY MASS INDEX: 24.17 KG/M2 | SYSTOLIC BLOOD PRESSURE: 130 MMHG | DIASTOLIC BLOOD PRESSURE: 82 MMHG | HEIGHT: 61 IN | OXYGEN SATURATION: 96 %

## 2023-12-04 DIAGNOSIS — E78.2 MIXED HYPERLIPIDEMIA: ICD-10-CM

## 2023-12-04 DIAGNOSIS — N18.31 STAGE 3A CHRONIC KIDNEY DISEASE (HCC): ICD-10-CM

## 2023-12-04 DIAGNOSIS — F32.5 MAJOR DEPRESSIVE DISORDER WITH SINGLE EPISODE, IN FULL REMISSION (HCC): ICD-10-CM

## 2023-12-04 DIAGNOSIS — M81.0 AGE-RELATED OSTEOPOROSIS WITHOUT CURRENT PATHOLOGICAL FRACTURE: ICD-10-CM

## 2023-12-04 DIAGNOSIS — I10 ESSENTIAL HYPERTENSION: ICD-10-CM

## 2023-12-04 DIAGNOSIS — I10 ESSENTIAL HYPERTENSION: Primary | ICD-10-CM

## 2023-12-04 LAB
25(OH)D3 SERPL-MCNC: 26.2 NG/ML
ALBUMIN SERPL-MCNC: 4 G/DL (ref 3.4–5)
ALBUMIN/GLOB SERPL: 1.4 {RATIO} (ref 1.1–2.2)
ALP SERPL-CCNC: 48 U/L (ref 40–129)
ALT SERPL-CCNC: 12 U/L (ref 10–40)
ANION GAP SERPL CALCULATED.3IONS-SCNC: 8 MMOL/L (ref 3–16)
AST SERPL-CCNC: 22 U/L (ref 15–37)
BASOPHILS # BLD: 0.1 K/UL (ref 0–0.2)
BASOPHILS NFR BLD: 1.2 %
BILIRUB SERPL-MCNC: <0.2 MG/DL (ref 0–1)
BUN SERPL-MCNC: 23 MG/DL (ref 7–20)
CALCIUM SERPL-MCNC: 9.4 MG/DL (ref 8.3–10.6)
CHLORIDE SERPL-SCNC: 107 MMOL/L (ref 99–110)
CHOLEST SERPL-MCNC: 248 MG/DL (ref 0–199)
CO2 SERPL-SCNC: 28 MMOL/L (ref 21–32)
CREAT SERPL-MCNC: 1.2 MG/DL (ref 0.6–1.2)
DEPRECATED RDW RBC AUTO: 15 % (ref 12.4–15.4)
EOSINOPHIL # BLD: 0.5 K/UL (ref 0–0.6)
EOSINOPHIL NFR BLD: 8.5 %
GFR SERPLBLD CREATININE-BSD FMLA CKD-EPI: 45 ML/MIN/{1.73_M2}
GLUCOSE SERPL-MCNC: 93 MG/DL (ref 70–99)
HCT VFR BLD AUTO: 35.4 % (ref 36–48)
HDLC SERPL-MCNC: 36 MG/DL (ref 40–60)
HGB BLD-MCNC: 11.9 G/DL (ref 12–16)
LDLC SERPL CALC-MCNC: 182 MG/DL
LYMPHOCYTES # BLD: 1.4 K/UL (ref 1–5.1)
LYMPHOCYTES NFR BLD: 25.3 %
MCH RBC QN AUTO: 31.6 PG (ref 26–34)
MCHC RBC AUTO-ENTMCNC: 33.6 G/DL (ref 31–36)
MCV RBC AUTO: 94.2 FL (ref 80–100)
MONOCYTES # BLD: 0.4 K/UL (ref 0–1.3)
MONOCYTES NFR BLD: 7.5 %
NEUTROPHILS # BLD: 3.2 K/UL (ref 1.7–7.7)
NEUTROPHILS NFR BLD: 57.5 %
PLATELET # BLD AUTO: 175 K/UL (ref 135–450)
PMV BLD AUTO: 9.7 FL (ref 5–10.5)
POTASSIUM SERPL-SCNC: 3.9 MMOL/L (ref 3.5–5.1)
PROT SERPL-MCNC: 6.8 G/DL (ref 6.4–8.2)
RBC # BLD AUTO: 3.76 M/UL (ref 4–5.2)
SODIUM SERPL-SCNC: 143 MMOL/L (ref 136–145)
TRIGL SERPL-MCNC: 151 MG/DL (ref 0–150)
VLDLC SERPL CALC-MCNC: 30 MG/DL
WBC # BLD AUTO: 5.6 K/UL (ref 4–11)

## 2023-12-04 PROCEDURE — G8420 CALC BMI NORM PARAMETERS: HCPCS | Performed by: NURSE PRACTITIONER

## 2023-12-04 PROCEDURE — G8484 FLU IMMUNIZE NO ADMIN: HCPCS | Performed by: NURSE PRACTITIONER

## 2023-12-04 PROCEDURE — G8399 PT W/DXA RESULTS DOCUMENT: HCPCS | Performed by: NURSE PRACTITIONER

## 2023-12-04 PROCEDURE — 3079F DIAST BP 80-89 MM HG: CPT | Performed by: NURSE PRACTITIONER

## 2023-12-04 PROCEDURE — 1090F PRES/ABSN URINE INCON ASSESS: CPT | Performed by: NURSE PRACTITIONER

## 2023-12-04 PROCEDURE — 1123F ACP DISCUSS/DSCN MKR DOCD: CPT | Performed by: NURSE PRACTITIONER

## 2023-12-04 PROCEDURE — 3075F SYST BP GE 130 - 139MM HG: CPT | Performed by: NURSE PRACTITIONER

## 2023-12-04 PROCEDURE — 99214 OFFICE O/P EST MOD 30 MIN: CPT | Performed by: NURSE PRACTITIONER

## 2023-12-04 PROCEDURE — 4004F PT TOBACCO SCREEN RCVD TLK: CPT | Performed by: NURSE PRACTITIONER

## 2023-12-04 PROCEDURE — G8427 DOCREV CUR MEDS BY ELIG CLIN: HCPCS | Performed by: NURSE PRACTITIONER

## 2023-12-04 ASSESSMENT — ENCOUNTER SYMPTOMS
GASTROINTESTINAL NEGATIVE: 1
RESPIRATORY NEGATIVE: 1

## 2023-12-04 NOTE — PROGRESS NOTES
SUBJECTIVE:    Patient ID: Kirsten Greenberg is a 79 y.o. female. CC: Hypertension, dyslipidemia, depression, CKD    HPI: The patient presents to the office today for follow-up of chronic medical conditions. She has no specific acute complaint today. She has a history of hypertension and dyslipidemia. She denies any chest pain or shortness of breath. She is compliant with her medication regimen. Patient has a history of mild CKD. Her most recent creatinine was normal at 1.2 with a GFR of 45. This appears stable with history    She has a history of depression. She feels this is stable. She is compliant with her medication regimen.       Past Medical History:   Diagnosis Date    Asthma     Breast cyst 1983    Essential tremor     HTN (hypertension)     Hyperlipidemia     Medical history reviewed with no changes     Ovarian cyst 1979    Pneumonia 2000    Stroke [facility])     mini        Current Outpatient Medications   Medication Sig Dispense Refill    lisinopril (PRINIVIL;ZESTRIL) 40 MG tablet TAKE 1 TABLET BY MOUTH EVERY DAY 90 tablet 3    amLODIPine (NORVASC) 5 MG tablet TAKE 1 TABLET BY MOUTH EVERY DAY 90 tablet 3    alendronate (FOSAMAX) 70 MG tablet TAKE 1 TABLET BY MOUTH ONE TIME PER WEEK 12 tablet 1    hydroCHLOROthiazide (HYDRODIURIL) 12.5 MG tablet TAKE 1 TABLET BY MOUTH EVERY DAY 90 tablet 1    Fexofenadine HCl (ALLEGRA ALLERGY PO) Take by mouth As needed      sertraline (ZOLOFT) 100 MG tablet TAKE 1.5 TABLETS BY MOUTH EVERY  tablet 3    simvastatin (ZOCOR) 20 MG tablet Take 1 tablet by mouth nightly 90 tablet 3    propranolol (INDERAL) 40 MG tablet Take 1 tablet by mouth 2 times daily 180 tablet 3    aspirin 81 MG EC tablet Take 1 tablet by mouth daily      albuterol sulfate HFA (PROVENTIL HFA) 108 (90 Base) MCG/ACT inhaler Inhale 2 puffs into the lungs every 6 hours as needed for Wheezing Any inhaler insurance will cover 1 Inhaler 1     No current facility-administered medications for

## 2023-12-05 RX ORDER — SIMVASTATIN 40 MG
40 TABLET ORAL NIGHTLY
Qty: 90 TABLET | Refills: 3 | Status: SHIPPED | OUTPATIENT
Start: 2023-12-05

## 2024-01-25 RX ORDER — HYDROCHLOROTHIAZIDE 12.5 MG/1
TABLET ORAL
Qty: 90 TABLET | Refills: 1 | Status: SHIPPED | OUTPATIENT
Start: 2024-01-25

## 2024-01-25 NOTE — TELEPHONE ENCOUNTER
Last OV: 12/4/2023  Next OV: 6/5/2024    Next appointment due:06/04/24    Last fill:07/31/23  Refills:[id number]

## 2024-02-05 ENCOUNTER — APPOINTMENT (OUTPATIENT)
Dept: CT IMAGING | Age: 84
DRG: 641 | End: 2024-02-05
Payer: MEDICARE

## 2024-02-05 ENCOUNTER — HOSPITAL ENCOUNTER (INPATIENT)
Age: 84
LOS: 3 days | Discharge: NURSING, REHAB OR LONG-TERM CARE | DRG: 641 | End: 2024-02-08
Attending: INTERNAL MEDICINE | Admitting: INTERNAL MEDICINE
Payer: MEDICARE

## 2024-02-05 ENCOUNTER — APPOINTMENT (OUTPATIENT)
Dept: GENERAL RADIOLOGY | Age: 84
DRG: 641 | End: 2024-02-05
Payer: MEDICARE

## 2024-02-05 DIAGNOSIS — R55 SYNCOPE AND COLLAPSE: Primary | ICD-10-CM

## 2024-02-05 DIAGNOSIS — T68.XXXA HYPOTHERMIA, INITIAL ENCOUNTER: ICD-10-CM

## 2024-02-05 DIAGNOSIS — N39.0 ACUTE UTI: ICD-10-CM

## 2024-02-05 DIAGNOSIS — T79.6XXA TRAUMATIC RHABDOMYOLYSIS, INITIAL ENCOUNTER (HCC): ICD-10-CM

## 2024-02-05 DIAGNOSIS — I48.91 ATRIAL FIBRILLATION, UNSPECIFIED TYPE (HCC): ICD-10-CM

## 2024-02-05 PROBLEM — R74.8 ELEVATED CK: Status: ACTIVE | Noted: 2024-02-05

## 2024-02-05 LAB
ALBUMIN SERPL-MCNC: 4 G/DL (ref 3.4–5)
ALP SERPL-CCNC: 57 U/L (ref 40–129)
ALT SERPL-CCNC: 29 U/L (ref 10–40)
ANION GAP SERPL CALCULATED.3IONS-SCNC: 24 MMOL/L (ref 3–16)
AST SERPL-CCNC: 75 U/L (ref 15–37)
BACTERIA URNS QL MICRO: NORMAL /HPF
BASOPHILS # BLD: 0 K/UL (ref 0–0.2)
BASOPHILS NFR BLD: 0 %
BILIRUB DIRECT SERPL-MCNC: <0.2 MG/DL (ref 0–0.3)
BILIRUB INDIRECT SERPL-MCNC: ABNORMAL MG/DL (ref 0–1)
BILIRUB SERPL-MCNC: 0.3 MG/DL (ref 0–1)
BILIRUB UR QL STRIP.AUTO: NEGATIVE
BUN SERPL-MCNC: 55 MG/DL (ref 7–20)
CALCIUM SERPL-MCNC: 9.6 MG/DL (ref 8.3–10.6)
CHLORIDE SERPL-SCNC: 102 MMOL/L (ref 99–110)
CK SERPL-CCNC: 1293 U/L (ref 26–192)
CLARITY UR: CLEAR
CO2 SERPL-SCNC: 17 MMOL/L (ref 21–32)
COLOR UR: YELLOW
CREAT SERPL-MCNC: 1.2 MG/DL (ref 0.6–1.2)
DEPRECATED RDW RBC AUTO: 15.7 % (ref 12.4–15.4)
EKG ATRIAL RATE: 64 BPM
EKG DIAGNOSIS: NORMAL
EKG Q-T INTERVAL: 316 MS
EKG QRS DURATION: 78 MS
EKG QTC CALCULATION (BAZETT): 373 MS
EKG R AXIS: -24 DEGREES
EKG T AXIS: -75 DEGREES
EKG VENTRICULAR RATE: 84 BPM
EOSINOPHIL # BLD: 0 K/UL (ref 0–0.6)
EOSINOPHIL NFR BLD: 0 %
EPI CELLS #/AREA URNS AUTO: 1 /HPF (ref 0–5)
FLUAV RNA RESP QL NAA+PROBE: NOT DETECTED
FLUBV RNA RESP QL NAA+PROBE: NOT DETECTED
GFR SERPLBLD CREATININE-BSD FMLA CKD-EPI: 45 ML/MIN/{1.73_M2}
GLUCOSE SERPL-MCNC: 122 MG/DL (ref 70–99)
GLUCOSE UR STRIP.AUTO-MCNC: NEGATIVE MG/DL
HCT VFR BLD AUTO: 39.7 % (ref 36–48)
HGB BLD-MCNC: 13.2 G/DL (ref 12–16)
HGB UR QL STRIP.AUTO: ABNORMAL
HYALINE CASTS #/AREA URNS AUTO: 0 /LPF (ref 0–8)
KETONES UR STRIP.AUTO-MCNC: 40 MG/DL
LACTATE BLDV-SCNC: 1.8 MMOL/L (ref 0.4–2)
LEUKOCYTE ESTERASE UR QL STRIP.AUTO: NEGATIVE
LYMPHOCYTES # BLD: 1 K/UL (ref 1–5.1)
LYMPHOCYTES NFR BLD: 6.3 %
MCH RBC QN AUTO: 31.1 PG (ref 26–34)
MCHC RBC AUTO-ENTMCNC: 33.2 G/DL (ref 31–36)
MCV RBC AUTO: 93.7 FL (ref 80–100)
MONOCYTES # BLD: 0.8 K/UL (ref 0–1.3)
MONOCYTES NFR BLD: 4.9 %
NEUTROPHILS # BLD: 14 K/UL (ref 1.7–7.7)
NEUTROPHILS NFR BLD: 88.8 %
NITRITE UR QL STRIP.AUTO: POSITIVE
NT-PROBNP SERPL-MCNC: 2079 PG/ML (ref 0–449)
NT-PROBNP SERPL-MCNC: 2182 PG/ML (ref 0–449)
PH UR STRIP.AUTO: 5.5 [PH] (ref 5–8)
PLATELET # BLD AUTO: 252 K/UL (ref 135–450)
PMV BLD AUTO: 9.2 FL (ref 5–10.5)
POTASSIUM SERPL-SCNC: 3.8 MMOL/L (ref 3.5–5.1)
PROCALCITONIN SERPL IA-MCNC: 0.32 NG/ML (ref 0–0.15)
PROCALCITONIN SERPL IA-MCNC: 0.38 NG/ML (ref 0–0.15)
PROT SERPL-MCNC: 7.4 G/DL (ref 6.4–8.2)
PROT UR STRIP.AUTO-MCNC: >=1000 MG/DL
RBC # BLD AUTO: 4.24 M/UL (ref 4–5.2)
RBC CLUMPS #/AREA URNS AUTO: 1 /HPF (ref 0–4)
SARS-COV-2 RNA RESP QL NAA+PROBE: NOT DETECTED
SODIUM SERPL-SCNC: 143 MMOL/L (ref 136–145)
SP GR UR STRIP.AUTO: 1.02 (ref 1–1.03)
TROPONIN, HIGH SENSITIVITY: 18 NG/L (ref 0–14)
TROPONIN, HIGH SENSITIVITY: 19 NG/L (ref 0–14)
TROPONIN, HIGH SENSITIVITY: 24 NG/L (ref 0–14)
UA COMPLETE W REFLEX CULTURE PNL UR: ABNORMAL
UA DIPSTICK W REFLEX MICRO PNL UR: YES
URN SPEC COLLECT METH UR: ABNORMAL
UROBILINOGEN UR STRIP-ACNC: 0.2 E.U./DL
WBC # BLD AUTO: 15.8 K/UL (ref 4–11)
WBC #/AREA URNS AUTO: 1 /HPF (ref 0–5)

## 2024-02-05 PROCEDURE — 87040 BLOOD CULTURE FOR BACTERIA: CPT

## 2024-02-05 PROCEDURE — 6370000000 HC RX 637 (ALT 250 FOR IP): Performed by: INTERNAL MEDICINE

## 2024-02-05 PROCEDURE — 84443 ASSAY THYROID STIM HORMONE: CPT

## 2024-02-05 PROCEDURE — 82550 ASSAY OF CK (CPK): CPT

## 2024-02-05 PROCEDURE — 81001 URINALYSIS AUTO W/SCOPE: CPT

## 2024-02-05 PROCEDURE — 2580000003 HC RX 258: Performed by: INTERNAL MEDICINE

## 2024-02-05 PROCEDURE — 80048 BASIC METABOLIC PNL TOTAL CA: CPT

## 2024-02-05 PROCEDURE — 73030 X-RAY EXAM OF SHOULDER: CPT

## 2024-02-05 PROCEDURE — 71045 X-RAY EXAM CHEST 1 VIEW: CPT

## 2024-02-05 PROCEDURE — 83605 ASSAY OF LACTIC ACID: CPT

## 2024-02-05 PROCEDURE — 96365 THER/PROPH/DIAG IV INF INIT: CPT

## 2024-02-05 PROCEDURE — 93005 ELECTROCARDIOGRAM TRACING: CPT | Performed by: INTERNAL MEDICINE

## 2024-02-05 PROCEDURE — 83880 ASSAY OF NATRIURETIC PEPTIDE: CPT

## 2024-02-05 PROCEDURE — 6360000002 HC RX W HCPCS: Performed by: INTERNAL MEDICINE

## 2024-02-05 PROCEDURE — 36415 COLL VENOUS BLD VENIPUNCTURE: CPT

## 2024-02-05 PROCEDURE — 72125 CT NECK SPINE W/O DYE: CPT

## 2024-02-05 PROCEDURE — 1200000000 HC SEMI PRIVATE

## 2024-02-05 PROCEDURE — 85025 COMPLETE CBC W/AUTO DIFF WBC: CPT

## 2024-02-05 PROCEDURE — 73502 X-RAY EXAM HIP UNI 2-3 VIEWS: CPT

## 2024-02-05 PROCEDURE — 84145 PROCALCITONIN (PCT): CPT

## 2024-02-05 PROCEDURE — 93010 ELECTROCARDIOGRAM REPORT: CPT | Performed by: INTERNAL MEDICINE

## 2024-02-05 PROCEDURE — 96375 TX/PRO/DX INJ NEW DRUG ADDON: CPT

## 2024-02-05 PROCEDURE — 70450 CT HEAD/BRAIN W/O DYE: CPT

## 2024-02-05 PROCEDURE — 87636 SARSCOV2 & INF A&B AMP PRB: CPT

## 2024-02-05 PROCEDURE — 80076 HEPATIC FUNCTION PANEL: CPT

## 2024-02-05 PROCEDURE — 99285 EMERGENCY DEPT VISIT HI MDM: CPT

## 2024-02-05 PROCEDURE — 84484 ASSAY OF TROPONIN QUANT: CPT

## 2024-02-05 RX ORDER — PROPRANOLOL HYDROCHLORIDE 20 MG/1
40 TABLET ORAL 2 TIMES DAILY
Status: DISCONTINUED | OUTPATIENT
Start: 2024-02-05 | End: 2024-02-08 | Stop reason: HOSPADM

## 2024-02-05 RX ORDER — ENOXAPARIN SODIUM 100 MG/ML
30 INJECTION SUBCUTANEOUS DAILY
Status: DISCONTINUED | OUTPATIENT
Start: 2024-02-05 | End: 2024-02-08 | Stop reason: HOSPADM

## 2024-02-05 RX ORDER — SODIUM CHLORIDE 0.9 % (FLUSH) 0.9 %
5-40 SYRINGE (ML) INJECTION EVERY 12 HOURS SCHEDULED
Status: DISCONTINUED | OUTPATIENT
Start: 2024-02-05 | End: 2024-02-08 | Stop reason: HOSPADM

## 2024-02-05 RX ORDER — SODIUM CHLORIDE 9 MG/ML
INJECTION, SOLUTION INTRAVENOUS PRN
Status: DISCONTINUED | OUTPATIENT
Start: 2024-02-05 | End: 2024-02-08 | Stop reason: HOSPADM

## 2024-02-05 RX ORDER — LISINOPRIL 20 MG/1
40 TABLET ORAL DAILY
Status: DISCONTINUED | OUTPATIENT
Start: 2024-02-05 | End: 2024-02-08 | Stop reason: HOSPADM

## 2024-02-05 RX ORDER — 0.9 % SODIUM CHLORIDE 0.9 %
1000 INTRAVENOUS SOLUTION INTRAVENOUS ONCE
Status: COMPLETED | OUTPATIENT
Start: 2024-02-05 | End: 2024-02-05

## 2024-02-05 RX ORDER — SODIUM CHLORIDE, SODIUM LACTATE, POTASSIUM CHLORIDE, CALCIUM CHLORIDE 600; 310; 30; 20 MG/100ML; MG/100ML; MG/100ML; MG/100ML
INJECTION, SOLUTION INTRAVENOUS CONTINUOUS
Status: DISCONTINUED | OUTPATIENT
Start: 2024-02-05 | End: 2024-02-08

## 2024-02-05 RX ORDER — ACETAMINOPHEN 650 MG/1
650 SUPPOSITORY RECTAL EVERY 6 HOURS PRN
Status: DISCONTINUED | OUTPATIENT
Start: 2024-02-05 | End: 2024-02-08 | Stop reason: HOSPADM

## 2024-02-05 RX ORDER — 0.9 % SODIUM CHLORIDE 0.9 %
500 INTRAVENOUS SOLUTION INTRAVENOUS ONCE
Status: COMPLETED | OUTPATIENT
Start: 2024-02-05 | End: 2024-02-05

## 2024-02-05 RX ORDER — ASPIRIN 81 MG/1
81 TABLET ORAL DAILY
Status: DISCONTINUED | OUTPATIENT
Start: 2024-02-05 | End: 2024-02-08 | Stop reason: HOSPADM

## 2024-02-05 RX ORDER — ATORVASTATIN CALCIUM 20 MG/1
20 TABLET, FILM COATED ORAL DAILY
Status: DISCONTINUED | OUTPATIENT
Start: 2024-02-05 | End: 2024-02-08 | Stop reason: HOSPADM

## 2024-02-05 RX ORDER — ACETAMINOPHEN 325 MG/1
650 TABLET ORAL EVERY 6 HOURS PRN
Status: DISCONTINUED | OUTPATIENT
Start: 2024-02-05 | End: 2024-02-08 | Stop reason: HOSPADM

## 2024-02-05 RX ORDER — POLYETHYLENE GLYCOL 3350 17 G/17G
17 POWDER, FOR SOLUTION ORAL DAILY PRN
Status: DISCONTINUED | OUTPATIENT
Start: 2024-02-05 | End: 2024-02-08 | Stop reason: HOSPADM

## 2024-02-05 RX ORDER — FENTANYL CITRATE 50 UG/ML
25 INJECTION, SOLUTION INTRAMUSCULAR; INTRAVENOUS ONCE
Status: COMPLETED | OUTPATIENT
Start: 2024-02-05 | End: 2024-02-05

## 2024-02-05 RX ORDER — AMLODIPINE BESYLATE 5 MG/1
5 TABLET ORAL DAILY
Status: DISCONTINUED | OUTPATIENT
Start: 2024-02-05 | End: 2024-02-08 | Stop reason: HOSPADM

## 2024-02-05 RX ORDER — SODIUM CHLORIDE 0.9 % (FLUSH) 0.9 %
5-40 SYRINGE (ML) INJECTION PRN
Status: DISCONTINUED | OUTPATIENT
Start: 2024-02-05 | End: 2024-02-08 | Stop reason: HOSPADM

## 2024-02-05 RX ADMIN — LISINOPRIL 40 MG: 20 TABLET ORAL at 20:49

## 2024-02-05 RX ADMIN — ACETAMINOPHEN 650 MG: 325 TABLET ORAL at 20:49

## 2024-02-05 RX ADMIN — ATORVASTATIN CALCIUM 20 MG: 20 TABLET, FILM COATED ORAL at 20:50

## 2024-02-05 RX ADMIN — PROPRANOLOL HYDROCHLORIDE 40 MG: 20 TABLET ORAL at 20:50

## 2024-02-05 RX ADMIN — FENTANYL CITRATE 25 MCG: 50 INJECTION INTRAMUSCULAR; INTRAVENOUS at 13:35

## 2024-02-05 RX ADMIN — SODIUM CHLORIDE 500 ML: 9 INJECTION, SOLUTION INTRAVENOUS at 13:32

## 2024-02-05 RX ADMIN — SODIUM CHLORIDE, POTASSIUM CHLORIDE, SODIUM LACTATE AND CALCIUM CHLORIDE: 600; 310; 30; 20 INJECTION, SOLUTION INTRAVENOUS at 20:58

## 2024-02-05 RX ADMIN — SODIUM CHLORIDE, PRESERVATIVE FREE 10 ML: 5 INJECTION INTRAVENOUS at 20:49

## 2024-02-05 RX ADMIN — ASPIRIN 81 MG: 81 TABLET, COATED ORAL at 20:49

## 2024-02-05 RX ADMIN — SERTRALINE HYDROCHLORIDE 150 MG: 50 TABLET ORAL at 20:49

## 2024-02-05 RX ADMIN — CEFTRIAXONE SODIUM 1000 MG: 1 INJECTION, POWDER, FOR SOLUTION INTRAMUSCULAR; INTRAVENOUS at 14:43

## 2024-02-05 RX ADMIN — SODIUM CHLORIDE 1000 ML: 9 INJECTION, SOLUTION INTRAVENOUS at 14:43

## 2024-02-05 RX ADMIN — ENOXAPARIN SODIUM 30 MG: 100 INJECTION SUBCUTANEOUS at 20:48

## 2024-02-05 RX ADMIN — AMLODIPINE BESYLATE 5 MG: 5 TABLET ORAL at 20:50

## 2024-02-05 ASSESSMENT — PAIN DESCRIPTION - LOCATION: LOCATION: HIP;LEG

## 2024-02-05 ASSESSMENT — PAIN SCALES - GENERAL
PAINLEVEL_OUTOF10: 6
PAINLEVEL_OUTOF10: 6
PAINLEVEL_OUTOF10: 10

## 2024-02-05 ASSESSMENT — PAIN SCALES - WONG BAKER: WONGBAKER_NUMERICALRESPONSE: 0

## 2024-02-05 ASSESSMENT — PAIN DESCRIPTION - FREQUENCY: FREQUENCY: INTERMITTENT

## 2024-02-05 ASSESSMENT — PAIN DESCRIPTION - ONSET: ONSET: GRADUAL

## 2024-02-05 ASSESSMENT — PAIN - FUNCTIONAL ASSESSMENT
PAIN_FUNCTIONAL_ASSESSMENT: 0-10
PAIN_FUNCTIONAL_ASSESSMENT: PREVENTS OR INTERFERES SOME ACTIVE ACTIVITIES AND ADLS

## 2024-02-05 ASSESSMENT — PAIN DESCRIPTION - DESCRIPTORS: DESCRIPTORS: ACHING

## 2024-02-05 ASSESSMENT — PAIN DESCRIPTION - PAIN TYPE: TYPE: ACUTE PAIN

## 2024-02-05 ASSESSMENT — PAIN DESCRIPTION - ORIENTATION: ORIENTATION: RIGHT;LEFT

## 2024-02-05 NOTE — PROGRESS NOTES
Patient seen in ED, room 05.  Admission completed through personal Belongings.  IP nurse will need to begin with Psychosocial Review and complete the admission including the 4 Eyes Assessment, Immunizations, Vaccines, Rights and Responsibilities, Orientation to room, Plan of Care, Education/Learning Assessment and Education Plan, white board, height and weight, pain assessment and head to toe assessment.  Patient is alert and is being admitted for Elevated CK.  Home Medication reconciliation was completed by Ronny Angelo.  All questions answered.

## 2024-02-05 NOTE — ED PROVIDER NOTES
EMERGENCY MEDICINE PROVIDER NOTE    Patient Identification  Pt Name: Jeannie Linder  MRN: [id number]  Birthdate 1940  Date of evaluation: 2024  Provider: CADEN BLANCHARD DO  PCP: Minh Haile, APRN - CNP    Chief Complaint  Fall (Pt brought in per [facility] from home, indep living pt was found on the floor in her apt, pt does not recall falling, pt is AAO x 3, pt c/o left hip and knee pain.  Body pain all over.)      HPI  (History provided by patient)  This is a 83 y.o. female who was brought in by EMS transportation for syncope.  Patient was found down for an unknown amount of time on the floor in her apartment.  Patient does live in independent living.  When the patient was found she was alert and oriented.  Patient states she hurts all over.  Patient denies abdominal pain.  Patient denies nausea and vomiting.  She does complain of left hip pain.  She denies any low back pain or neck pain.  She denies any headache.    I have reviewed the following nursing documentation:  Allergies: Levofloxacin, Doxycycline, and Penicillins    Past medical history:   Past Medical History:   Diagnosis Date    Asthma     Breast cyst     Essential tremor     HTN (hypertension)     Hyperlipidemia     Medical history reviewed with no changes     Ovarian cyst     Pneumonia     Stroke (HCC)     mini     Past surgical history:   Past Surgical History:   Procedure Laterality Date    CATARACT REMOVAL       SECTION      HYSTERECTOMY (CERVIX STATUS UNKNOWN)  1981    INNER EAR SURGERY  2007    PAIN MANAGEMENT PROCEDURE Right 2020    RIGHT L1 AND L2 TRANSFORAMINAL EPIDURAL STEROID INJECTION WITH FLUOROSCOPY (86798, [id number]) performed by Angelika Knott MD at [facility]    PAIN MANAGEMENT PROCEDURE Right 2020    RIGHT L1 AND L2 TRANSFORAMINAL EPIDURAL STEROID INJECTION WITH FLUOROSCOPY (37631, 97030) performed by Angelika Knott MD at [facility]    PAIN MANAGEMENT PROCEDURE Bilateral 2020    BILATERAL

## 2024-02-05 NOTE — PROGRESS NOTES
Pharmacy Home Medication Reconciliation Note    A medication reconciliation has been completed for Jeannie Linder 1940    [facility] [address] [place] [place]  Information provided by: patient    The patient's home medication list is as follows:  No current facility-administered medications on file prior to encounter.     Current Outpatient Medications on File Prior to Encounter   Medication Sig Dispense Refill    hydroCHLOROthiazide 12.5 MG tablet TAKE 1 TABLET BY MOUTH EVERY DAY (Patient taking differently: Take 1 tablet by mouth daily) 90 tablet 1    simvastatin (ZOCOR) 40 MG tablet Take 1 tablet by mouth nightly 90 tablet 3    lisinopril (PRINIVIL;ZESTRIL) 40 MG tablet TAKE 1 TABLET BY MOUTH EVERY DAY (Patient taking differently: Take 1 tablet by mouth daily TAKE 1 TABLET BY MOUTH EVERY DAY) 90 tablet 3    amLODIPine (NORVASC) 5 MG tablet TAKE 1 TABLET BY MOUTH EVERY DAY (Patient taking differently: Take 1 tablet by mouth daily) 90 tablet 3    alendronate (FOSAMAX) 70 MG tablet TAKE 1 TABLET BY MOUTH ONE TIME PER WEEK (Patient not taking: Reported on 2/5/2024) 12 tablet 1    fexofenadine (ALLEGRA) 180 MG tablet Take 1 tablet by mouth daily as needed (Seasonal allergies.) As needed      sertraline (ZOLOFT) 100 MG tablet TAKE 1.5 TABLETS BY MOUTH EVERY DAY (Patient taking differently: Take 1.5 tablets by mouth daily) 135 tablet 3    propranolol (INDERAL) 40 MG tablet Take 1 tablet by mouth 2 times daily 180 tablet 3    aspirin 81 MG EC tablet Take 1 tablet by mouth daily      albuterol sulfate HFA (PROVENTIL HFA) 108 (90 Base) MCG/ACT inhaler Inhale 2 puffs into the lungs every 6 hours as needed for Wheezing Any inhaler insurance will cover (Patient not taking: Reported on 2/5/2024) 1 Inhaler 1       Patient is no longer taking alendronate or using an albuterol inhaler.    Of note, patient states she didn't take any medications today prior to ED arrival but remembers taking them

## 2024-02-05 NOTE — ED NOTES
ED TO INPATIENT SBAR HANDOFF    Patient Name: Jeannie Linder   :  1940  83 y.o.   MRN:  [id number]  Preferred Name  Jeannie  ED Room #:  ED-0005/05  Family/Caregiver Present no   Restraints no   Sitter no   Sepsis Risk Score Sepsis Risk Score: 22.01    Situation  Code Status: No Order No additional code details.    Allergies: Levofloxacin, Doxycycline, and Penicillins  Weight: Patient Vitals for the past 96 hrs (Last 3 readings):   Weight   24 1257 58.1 kg (128 lb)     Arrived from: Independent Living  Chief Complaint:   Chief Complaint   Patient presents with    Fall     Pt brought in per [facility] from home, indep living pt was found on the floor in her apt, pt does not recall falling, pt is AAO x 3, pt c/o left hip and knee pain.  Body pain all over.     Hospital Problem/Diagnosis:  Principal Problem:    Elevated CK  Resolved Problems:    * No resolved hospital problems. *    Imaging:   CT CERVICAL SPINE WO CONTRAST   Final Result   No acute intracranial abnormality.      Mild to moderate global parenchymal volume loss with chronic microvascular   ischemic disease.      No acute osseous abnormality of the cervical spine.      Degenerative changes of the cervical spine as described above.         CT HEAD WO CONTRAST   Final Result   No acute intracranial abnormality.      Mild to moderate global parenchymal volume loss with chronic microvascular   ischemic disease.      No acute osseous abnormality of the cervical spine.      Degenerative changes of the cervical spine as described above.         XR HIP 2-3 VW W PELVIS LEFT   Preliminary Result   1. No evidence of fracture at the left hip joint or on rest of AP view of the   pelvis.   2. Osteopenia.         XR CHEST PORTABLE   Final Result   Cardiac enlargement and mild vascular prominence.  Findings are consistent   with failure in the correct clinical setting.         XR SHOULDER LEFT (MIN 2 VIEWS)   Preliminary Result   No evidence of fracture  or osseous malalignment at the left shoulder.           Abnormal labs:   Abnormal Labs Reviewed   CBC WITH AUTO DIFFERENTIAL - Abnormal; Notable for the following components:       Result Value    WBC 15.8 (*)     RDW 15.7 (*)     Neutrophils Absolute 14.0 (*)     All other components within normal limits   BASIC METABOLIC PANEL - Abnormal; Notable for the following components:    CO2 17 (*)     Anion Gap 24 (*)     Glucose 122 (*)     BUN 55 (*)     Est, Glom Filt Rate 45 (*)     All other components within normal limits   TROPONIN - Abnormal; Notable for the following components:    Troponin, High Sensitivity 18 (*)     All other components within normal limits   URINALYSIS WITH REFLEX TO CULTURE - Abnormal; Notable for the following components:    Ketones, Urine 40 (*)     Blood, Urine LARGE (*)     Nitrite, Urine POSITIVE (*)     All other components within normal limits   CK - Abnormal; Notable for the following components:    Total CK 1,293 (*)     All other components within normal limits   HEPATIC FUNCTION PANEL - Abnormal; Notable for the following components:    AST 75 (*)     All other components within normal limits   BRAIN NATRIURETIC PEPTIDE - Abnormal; Notable for the following components:    Pro-BNP 2,079 (*)     All other components within normal limits   TROPONIN - Abnormal; Notable for the following components:    Troponin, High Sensitivity 19 (*)     All other components within normal limits   PROCALCITONIN - Abnormal; Notable for the following components:    Procalcitonin 0.32 (*)     All other components within normal limits     Critical values: yes     Abnormal Assessment Findings: CK, BUN, BNP, UTI    Background  History:   Past Medical History:   Diagnosis Date    Asthma     Breast cyst 1983    Essential tremor     HTN (hypertension)     Hyperlipidemia     Medical history reviewed with no changes     Ovarian cyst 1979    Pneumonia 2000    Stroke (HCC)     mini

## 2024-02-05 NOTE — H&P
HOSPITALISTS HISTORY AND PHYSICAL    2/5/2024 4:38 PM    Patient Information:  LIZBETH OROZCO is a 83 y.o. female [id number]  PCP:  Minh Haile APRN - CNP (Tel: [phone] )    Chief complaint:    Chief Complaint   Patient presents with    Fall     Pt brought in per [facility] from home, indep living pt was found on the floor in her apt, pt does not recall falling, pt is AAO x 3, pt c/o left hip and knee pain.  Body pain all over.       History of Present Illness:  Lizbeth Orozco is a 83 y.o. female who was brought in for EMS as patient was found down on the floor of her apartment lives in independent living unsure how long she was down for patient states 2 days ago was having severe nausea and vomiting no diarrhea at the time fevers or chills patient does not number fall it is unsure how long she was down for is currently awake alert oriented x 3 denies any chest pain shortness breath fever chills nausea vomiting does complain of slight left hip discomfort    REVIEW OF SYSTEMS:   Constitutional: Negative for fever,chills or night sweats  ENT: Negative for rhinorrhea, epistaxis, hoarseness, sore throat.  Respiratory: Negative for shortness of breath,wheezing  Cardiovascular: Negative for chest pain, palpitations   Gastrointestinal: Negative for nausea, vomiting, diarrhea  Genitourinary: Negative for polyuria, dysuria   Hematologic/Lymphatic: Negative for bleeding tendency, easy bruising  Neurologic: Negative for confusion,dysarthria.  Skin: Negative for itching,rash  Psychiatric: Negative for depression,anxiety, agitation.  Endocrine: Negative for polydipsia,polyuria,heat /cold intolerance.    Past Medical History:   has a past medical history of Asthma, Breast cyst, Essential tremor, HTN (hypertension), Hyperlipidemia, Medical history reviewed with no changes, Ovarian cyst, Pneumonia, and Stroke (HCC).  global parenchymal volume loss with chronic microvascular   ischemic disease.      No acute osseous abnormality of the cervical spine.      Degenerative changes of the cervical spine as described above.         CT HEAD WO CONTRAST   Final Result   No acute intracranial abnormality.      Mild to moderate global parenchymal volume loss with chronic microvascular   ischemic disease.      No acute osseous abnormality of the cervical spine.      Degenerative changes of the cervical spine as described above.         XR HIP 2-3 VW W PELVIS LEFT   Preliminary Result   1. No evidence of fracture at the left hip joint or on rest of AP view of the   pelvis.   2. Osteopenia.         XR CHEST PORTABLE   Final Result   Cardiac enlargement and mild vascular prominence.  Findings are consistent   with failure in the correct clinical setting.         XR SHOULDER LEFT (MIN 2 VIEWS)   Preliminary Result   No evidence of fracture or osseous malalignment at the left shoulder.             Recent imaging reviewed    Problem List  Principal Problem:    Elevated CK  Resolved Problems:    * No resolved hospital problems. *        Assessment/Plan:   Syncope? Vasal vagal vs cardiac  - tele  - repeat troponin  - echo  - cards consult    Elevated ck  Ivf repeat labsin am    Dehydration ivf    ? Afib: echo, tsh, cards consutl tele    Hypotheremia secondary to above, bear hugger for now    Leukocytsosi ? Reactive repeat labs in am    DVT prophylaxis lovenox  Code status full code        Admit as inpatient I anticipate hospitalization spanning more than two midnights for investigation and treatment of the above medically necessary diagnoses.    Please note that some part of this chart was generated using Dragon dictation software. Although every effort was made to ensure the accuracy of this automated transcription, some errors in transcription may have occurred inadvertently. If you may need any clarification, please do not hesitate to contact me

## 2024-02-06 PROBLEM — R93.89 ABNORMAL CHEST X-RAY: Status: ACTIVE | Noted: 2024-02-06

## 2024-02-06 PROBLEM — R94.31 ABNORMAL EKG: Status: ACTIVE | Noted: 2024-02-06

## 2024-02-06 PROBLEM — D72.829 LEUKOCYTOSIS: Status: ACTIVE | Noted: 2024-02-06

## 2024-02-06 LAB
ANION GAP SERPL CALCULATED.3IONS-SCNC: 15 MMOL/L (ref 3–16)
BASOPHILS # BLD: 0 K/UL (ref 0–0.2)
BASOPHILS NFR BLD: 0 %
BUN SERPL-MCNC: 53 MG/DL (ref 7–20)
CALCIUM SERPL-MCNC: 9 MG/DL (ref 8.3–10.6)
CHLORIDE SERPL-SCNC: 108 MMOL/L (ref 99–110)
CK SERPL-CCNC: 1826 U/L (ref 26–192)
CO2 SERPL-SCNC: 19 MMOL/L (ref 21–32)
CREAT SERPL-MCNC: 1.1 MG/DL (ref 0.6–1.2)
DEPRECATED RDW RBC AUTO: 16.2 % (ref 12.4–15.4)
EOSINOPHIL # BLD: 0 K/UL (ref 0–0.6)
EOSINOPHIL NFR BLD: 0 %
GFR SERPLBLD CREATININE-BSD FMLA CKD-EPI: 49 ML/MIN/{1.73_M2}
GLUCOSE SERPL-MCNC: 97 MG/DL (ref 70–99)
HCT VFR BLD AUTO: 36.3 % (ref 36–48)
HGB BLD-MCNC: 12.2 G/DL (ref 12–16)
LYMPHOCYTES # BLD: 1.5 K/UL (ref 1–5.1)
LYMPHOCYTES NFR BLD: 11.4 %
MAGNESIUM SERPL-MCNC: 2.1 MG/DL (ref 1.8–2.4)
MCH RBC QN AUTO: 31.1 PG (ref 26–34)
MCHC RBC AUTO-ENTMCNC: 33.6 G/DL (ref 31–36)
MCV RBC AUTO: 92.7 FL (ref 80–100)
MONOCYTES # BLD: 0.9 K/UL (ref 0–1.3)
MONOCYTES NFR BLD: 6.7 %
NEUTROPHILS # BLD: 11.1 K/UL (ref 1.7–7.7)
NEUTROPHILS NFR BLD: 81.9 %
PLATELET # BLD AUTO: 211 K/UL (ref 135–450)
PMV BLD AUTO: 9.3 FL (ref 5–10.5)
POTASSIUM SERPL-SCNC: 3.5 MMOL/L (ref 3.5–5.1)
RBC # BLD AUTO: 3.91 M/UL (ref 4–5.2)
SODIUM SERPL-SCNC: 142 MMOL/L (ref 136–145)
TSH SERPL DL<=0.005 MIU/L-ACNC: 1.83 UIU/ML (ref 0.27–4.2)
WBC # BLD AUTO: 13.5 K/UL (ref 4–11)

## 2024-02-06 PROCEDURE — 97530 THERAPEUTIC ACTIVITIES: CPT

## 2024-02-06 PROCEDURE — 85025 COMPLETE CBC W/AUTO DIFF WBC: CPT

## 2024-02-06 PROCEDURE — 6370000000 HC RX 637 (ALT 250 FOR IP): Performed by: INTERNAL MEDICINE

## 2024-02-06 PROCEDURE — 6360000002 HC RX W HCPCS: Performed by: INTERNAL MEDICINE

## 2024-02-06 PROCEDURE — 97161 PT EVAL LOW COMPLEX 20 MIN: CPT

## 2024-02-06 PROCEDURE — 1200000000 HC SEMI PRIVATE

## 2024-02-06 PROCEDURE — 99223 1ST HOSP IP/OBS HIGH 75: CPT | Performed by: INTERNAL MEDICINE

## 2024-02-06 PROCEDURE — 36415 COLL VENOUS BLD VENIPUNCTURE: CPT

## 2024-02-06 PROCEDURE — 2580000003 HC RX 258: Performed by: INTERNAL MEDICINE

## 2024-02-06 PROCEDURE — 97166 OT EVAL MOD COMPLEX 45 MIN: CPT

## 2024-02-06 PROCEDURE — 82550 ASSAY OF CK (CPK): CPT

## 2024-02-06 PROCEDURE — 83735 ASSAY OF MAGNESIUM: CPT

## 2024-02-06 PROCEDURE — 80048 BASIC METABOLIC PNL TOTAL CA: CPT

## 2024-02-06 PROCEDURE — 97535 SELF CARE MNGMENT TRAINING: CPT

## 2024-02-06 RX ADMIN — PROPRANOLOL HYDROCHLORIDE 40 MG: 20 TABLET ORAL at 20:32

## 2024-02-06 RX ADMIN — ENOXAPARIN SODIUM 30 MG: 100 INJECTION SUBCUTANEOUS at 08:21

## 2024-02-06 RX ADMIN — ATORVASTATIN CALCIUM 20 MG: 20 TABLET, FILM COATED ORAL at 08:21

## 2024-02-06 RX ADMIN — ASPIRIN 81 MG: 81 TABLET, COATED ORAL at 08:21

## 2024-02-06 RX ADMIN — SODIUM CHLORIDE, PRESERVATIVE FREE 10 ML: 5 INJECTION INTRAVENOUS at 08:23

## 2024-02-06 RX ADMIN — SERTRALINE HYDROCHLORIDE 150 MG: 50 TABLET ORAL at 08:21

## 2024-02-06 RX ADMIN — LISINOPRIL 40 MG: 20 TABLET ORAL at 08:21

## 2024-02-06 RX ADMIN — ACETAMINOPHEN 650 MG: 325 TABLET ORAL at 04:58

## 2024-02-06 RX ADMIN — ACETAMINOPHEN 650 MG: 325 TABLET ORAL at 20:32

## 2024-02-06 RX ADMIN — AMLODIPINE BESYLATE 5 MG: 5 TABLET ORAL at 08:21

## 2024-02-06 ASSESSMENT — PAIN SCALES - GENERAL
PAINLEVEL_OUTOF10: 6
PAINLEVEL_OUTOF10: 0
PAINLEVEL_OUTOF10: 5

## 2024-02-06 ASSESSMENT — PAIN DESCRIPTION - ORIENTATION
ORIENTATION: RIGHT;LEFT
ORIENTATION: RIGHT;LEFT

## 2024-02-06 ASSESSMENT — PAIN DESCRIPTION - DESCRIPTORS
DESCRIPTORS: ACHING
DESCRIPTORS: ACHING;SORE

## 2024-02-06 ASSESSMENT — PAIN DESCRIPTION - LOCATION
LOCATION: HIP;LEG
LOCATION: HIP;LEG

## 2024-02-06 ASSESSMENT — PAIN - FUNCTIONAL ASSESSMENT: PAIN_FUNCTIONAL_ASSESSMENT: PREVENTS OR INTERFERES SOME ACTIVE ACTIVITIES AND ADLS

## 2024-02-06 ASSESSMENT — PAIN DESCRIPTION - PAIN TYPE
TYPE: ACUTE PAIN
TYPE: ACUTE PAIN

## 2024-02-06 NOTE — PROGRESS NOTES
4 Eyes Skin Assessment     The patient is being assess for  Admission    I agree that 2 RN's have performed a thorough Head to Toe Skin Assessment on the patient. ALL assessment sites listed below have been assessed.       Areas assessed by both nurses:   [x]   Head, Face, and Ears   [x]   Shoulders, Back, and Chest  [x]   Arms, Elbows, and Hands   [x]   Coccyx, Sacrum, and Ischum  [x]   Legs, Feet, and Heels        Does the Patient have Skin Breakdown?  No         Ricky Prevention initiated:  Yes   Wound Care Orders initiated:  No      [facility] nurse consulted for Pressure Injury (Stage 3,4, Unstageable, DTI, NWPT, and Complex wounds):  No      Nurse 1 eSignature: Electronically signed by Shelby Ontiveros RN on 2/6/24 at 1:13 AM EST    **SHARE this note so that the co-signing nurse is able to place an eSignature**    Nurse 2 eSignature: {Esignature:[id number]}

## 2024-02-06 NOTE — PROGRESS NOTES
[facility] - Inpatient Rehabilitation Department   Phone: [phone]    Occupational Therapy    [x] Initial Evaluation            [] Daily Treatment Note         [] Discharge Summary      Patient: Jeannie Linder   : 1940   MRN: [id number]   Date of Service:  2024    Admitting Diagnosis:  Elevated CK  Current Admission Summary:     Fall        Pt brought in per [facility] from home, indep living pt was found on the floor in her apt, pt does not recall falling, pt is AAO x 3, pt c/o left hip and knee pain.  Body pain all over.         History of Present Illness:  Jeannie Linder is a 83 y.o. female who was brought in for EMS as patient was found down on the floor of her apartment lives in independent living unsure how long she was down for patient states 2 days ago was having severe nausea and vomiting no diarrhea at the time fevers or chills patient does not number fall it is unsure how long she was down for is currently awake alert oriented x 3 denies any chest pain shortness breath fever chills nausea vomiting does complain of slight left hip discomfort      Imaging:   CT CERVICAL SPINE WO CONTRAST   Final Result   No acute intracranial abnormality.       Mild to moderate global parenchymal volume loss with chronic microvascular   ischemic disease.       No acute osseous abnormality of the cervical spine.       Degenerative changes of the cervical spine as described above.           CT HEAD WO CONTRAST   Final Result   No acute intracranial abnormality.       Mild to moderate global parenchymal volume loss with chronic microvascular   ischemic disease.       No acute osseous abnormality of the cervical spine.       Degenerative changes of the cervical spine as described above.           XR HIP 2-3 VW W PELVIS LEFT   Preliminary Result   1. No evidence of fracture at the left hip joint or on rest of AP view of the   pelvis.   2. Osteopenia.           XR CHEST PORTABLE   Final Result  ongoing at this time unless indicated above.       Therapy Session Time     Individual Group Co-treatment   Time In    1010   Time Out    1108   Minutes    58        Timed Code Treatment Minutes:  43    Total Treatment Minutes:  58       Electronically Signed By: DIANA Dobbins/PIERRE [id number]

## 2024-02-06 NOTE — CONSULTS
Jeannie Linder  2024  [id number]    Chief Complaint: Elevated CK    HPI: Jeannie Linder is a 83 y.o. female with PMHx notable for hypertension, hyperlipidemia, asthma who presented on 2023 with fall of unclear etiology (syncope versus mechanical), found down for an unknown duration.  She was alert and fully oriented, complaining of left hip and knee pain, amnestic to the events of her fall.  Initial workup revealed leukocytosis, elevated CK and BNP levels, elevated troponin.  CT head, CT cervical spine, x-ray left shoulder and hip were all negative for acute traumatic injury.  Cardiology was consulted for abnormal EKG and loss of consciousness.  Admission EKG was read as A-fib, cardiology felt this was not definitive and recommended continued monitoring.  Recommending cardiac event monitor on discharge.  VINAY was recommended to rule out valvular abnormality.  Hospital course complicated by: rhabdomyolysis, leukocytosis, hypertension.     Currently patient reports that she is experiencing bilateral hip pain, right worse than left.  Endorses feeling lightheaded with sitting or attempts at standing.  Denies any fevers, chills, chest pain, dyspnea, abdominal pain.  Endorses chronic urinary incontinence.    Past Medical History:   Diagnosis Date    Asthma     Breast cyst     Essential tremor     HTN (hypertension)     Hyperlipidemia     Medical history reviewed with no changes     Ovarian cyst     Pneumonia     Stroke (HCC)     mini       Past Surgical History:   Procedure Laterality Date    CATARACT REMOVAL       SECTION      HYSTERECTOMY (CERVIX STATUS UNKNOWN)  1981    INNER EAR SURGERY  2007    PAIN MANAGEMENT PROCEDURE Right 2020    RIGHT L1 AND L2 TRANSFORAMINAL EPIDURAL STEROID INJECTION WITH FLUOROSCOPY (29861, [id number]) performed by Angelika Knott MD at [facility]    PAIN MANAGEMENT PROCEDURE Right 2020    RIGHT L1 AND L2 TRANSFORAMINAL EPIDURAL STEROID INJECTION WITH

## 2024-02-06 NOTE — PROGRESS NOTES
off EOB and trunk mobility. Pt exhibited increased pain in hips with upright movement. Pt required max A of 1 to move hips forward. V/c to lean to contralateral side when moving ipsilateral hip.  Transfers:  Sit to stand transfer: 2 person assistance with mod A   Stand to sit transfer: 2 person assistance with mod A   Bed to chair transfer: stedy utilized requiring 2 person mod A   Comments: Pt required 2 person mod A to sit with v/c for rocking and UE placement on bed prior to AD. Fair concentric force production with slow trunk extension and posterior hips. V/c to bring hips forward and place weight more forward, not backward on heels. Pt able to stand upright for 1 minute before requiring a seated rest break. Pt moved to recliner with 2 person mod A  for hip and trunk support.   Ambulation:  Surface:level surface  Assistive Device: rolling walker  Assistance: 2 person assistance with Max A   Distance: 2 steps  Gait Mechanics: Pt exhibited small slow shuffled steps with decreased hip flexion and decreased knee extension. 2 steps completed with 2 person max A. Posterior lean and forward flexed posture.  Terminated 2/2 unsafe gait mechanics and fatigue.  Comments:  Pt complaints of B hip pain and weakness lifting feet off floor. No SIMMS noted.  Stair Mobility:  Stair mobility not completed on this date.  Comments:  Wheelchair Mobility:  No w/c mobility completed on this date.  Comments:  Balance:  Static Sitting Balance: poor (+): requires min (A) to maintain balance  Dynamic Sitting Balance: poor (+): requires min (A) to maintain balance  Static Standing Balance: poor: requires mod (A) to maintain balance  Dynamic Standing Balance: poor (-): requires max (A) to maintain balance  Comments:    Other Therapeutic Interventions  - ADLs (see OT note)  - D/c assessment and recommendations  - repostioning    Functional Outcomes  AM-PAC Inpatient Mobility Raw Score : 7              Cognition  Overall Cognitive Status:  WFL  Arousal/Alertness: delayed responses to stimuli  Following Commands: follows one step commands with repetition, follows one step commands with increased time  Memory: decreased recall of recent events, decreased short term memory  Initiation: requires cues for some    Pt unable to recall Dr. Dorantes coming in to perform an assessment 20 minutes after the event.     Orientation:    oriented to person, oriented to place, oriented to situation, and disoriented to time   Command Following:   accurately follows one step commands    Education  Barriers To Learning: cognition  Patient Education: patient educated on goals, PT role and benefits, plan of care, general safety, functional mobility training, proper use of assistive device/equipment, orientation, transfer training, discharge recommendations  Learning Assessment:  patient will require reinforcement due to cognitive deficits    Assessment  Activity Tolerance: Fair, pt able to sit at EOB with intermittent min A for 10 minutes. Unable to stand for longer than 1 minute 2/2 pain and decreased endurance.  Impairments Requiring Therapeutic Intervention: decreased functional mobility, decreased ADL status, decreased ROM, decreased strength, decreased cognition, decreased endurance, decreased sensation, decreased balance, increased pain, decreased posture  Prognosis: fair  Clinical Assessment: Pt is a 83 year old F admitted for syncope with a fall. Prior to fall pt required rollator for transfers and completed ambulation and ADLs IND. Following admission the pt is far below her baseline requiring Max A x2 - mod A for bed mobility and Mod A x2 for STS. Ambulation was unsafe with RW and terminated. Pt exhibited deficits in cognition throughout session with decreased awareness of time and impaired short term memory (Dr. Dorantes examination 20 minutes prior, could not recall that another person was in the room besides us). Pt has incontinence and states that is her baseline. Pt

## 2024-02-06 NOTE — PLAN OF CARE
Problem: Discharge Planning  Goal: Discharge to home or other facility with appropriate resources  2/6/2024 0843 by Ryan Simon RN  Outcome: Progressing  2/6/2024 0256 by Shelby Ontiveros RN  Outcome: Progressing     Problem: Pain  Goal: Verbalizes/displays adequate comfort level or baseline comfort level  2/6/2024 0843 by Ryan Simon RN  Outcome: Progressing  2/6/2024 0256 by Shelby Ontiveros RN  Outcome: Progressing  Flowsheets (Taken 2/5/2024 2030)  Verbalizes/displays adequate comfort level or baseline comfort level:   Encourage patient to monitor pain and request assistance   Assess pain using appropriate pain scale   Administer analgesics based on type and severity of pain and evaluate response   Implement non-pharmacological measures as appropriate and evaluate response     Problem: Skin/Tissue Integrity  Goal: Absence of new skin breakdown  Description: 1.  Monitor for areas of redness and/or skin breakdown  2.  Assess vascular access sites hourly  3.  Every 4-6 hours minimum:  Change oxygen saturation probe site  4.  Every 4-6 hours:  If on nasal continuous positive airway pressure, respiratory therapy assess nares and determine need for appliance change or resting period.  2/6/2024 0843 by Ryan Simon RN  Outcome: Progressing  2/6/2024 0256 by Shelby Ontiveros RN  Outcome: Progressing     Problem: Safety - Adult  Goal: Free from fall injury  2/6/2024 0843 by Ryan Simon RN  Outcome: Progressing  2/6/2024 0256 by Shelby Ontiveros RN  Outcome: Progressing     Problem: ABCDS Injury Assessment  Goal: Absence of physical injury  2/6/2024 0843 by Ryan Simon RN  Outcome: Progressing  2/6/2024 0256 by Shelby Ontiveros RN  Outcome: Progressing     Problem: Cardiovascular - Adult  Goal: Maintains optimal cardiac output and hemodynamic stability  2/6/2024 0843 by Ryan Simon RN  Outcome: Progressing  2/6/2024 0256 by Shelby Ontiveros RN  Outcome: Progressing  Goal:

## 2024-02-06 NOTE — CONSULTS
[facility]   Electrophysiology Consultation   Date: 2024  Reason for Consultation: Loss of consciousness  Consult Requesting Physician: Hany Amezquita MD     Chief Complaint   Patient presents with    Fall     Pt brought in per [facility] from home, indep living pt was found on the floor in her apt, pt does not recall falling, pt is AAO x 3, pt c/o left hip and knee pain.  Body pain all over.     HPI: Jeannie Linder is a 83 y.o. female with history of hypertension, hyperlipidemia, asthma, reported history of stroke was brought to the emergency room after she was found down on the floor of her room.  She is not sure how long she has been down.  She does not remember falling.  2 days ago she had severe nausea and vomiting but no diarrhea.    She has elevated WBC, CK and proBNP.  Past Medical History:   Diagnosis Date    Asthma     Breast cyst     Essential tremor     HTN (hypertension)     Hyperlipidemia     Medical history reviewed with no changes     Ovarian cyst     Pneumonia     Stroke (HCC)     mini        Past Surgical History:   Procedure Laterality Date    CATARACT REMOVAL       SECTION      HYSTERECTOMY (CERVIX STATUS UNKNOWN)      INNER EAR SURGERY      PAIN MANAGEMENT PROCEDURE Right 2020    RIGHT L1 AND L2 TRANSFORAMINAL EPIDURAL STEROID INJECTION WITH FLUOROSCOPY (39023, 74343) performed by Angelika Knott MD at [facility]    PAIN MANAGEMENT PROCEDURE Right 2020    RIGHT L1 AND L2 TRANSFORAMINAL EPIDURAL STEROID INJECTION WITH FLUOROSCOPY (25567, 35323) performed by Angelika Knott MD at [facility]    PAIN MANAGEMENT PROCEDURE Bilateral 2020    BILATERAL L1, L2, L3 MEDIAL BRANCH BLOCK WITH FLUOROSCOPY (97248, 13108)  #1 performed by Angelika Knott MD at [facility]    PAIN MANAGEMENT PROCEDURE Bilateral 2020    BILATERAL L1 ,L2,L3 MEDIAL BRANCH BLOCKS WITH FLUOROSCOPY performed by Angelika Knott MD at [facility]    PAIN MANAGEMENT PROCEDURE  PACs.      At this point we will continue to monitor.  Adequate hydration.  Echocardiogram.  She has had monitors in the past that did not show any significant arrhythmia.  Will repeat a monitor on discharge..    -Abnormal EKG, frequent PAC    All of her previous EKGs show sinus rhythm with frequent PACs and sinus arrhythmia.  The EKG from this admission was read as atrial fibrillation but I am not convinced that it is atrial fibrillation.  At this point I would not  make that diagnosis based on that single EKG.  Will continue to monitor.    -Hypertension    Continue home medication.  Blood pressure is slightly elevated, will monitor and adjust medication.    -Hyperlipidemia    Continue statin    -Elevated CPK    Related to rhabdomyolysis due to staying on the floor for long period of time.  Continue hydration.      -Elevated WBC and procalcitonin    No clear evidence of infection.  Further evaluation by ID.  VINAY to assess for any possibility of valvular involvement.  Blood cultures.      -Abnormal chest x-ray    Will see what the echo shows as far as LV function.  Gentle hydration.          I independently reviewed and interpreted ECG, telemetry, cardiac labs, other relevant labs,  echo stress test  holter MCOT CXR ,   . Unless otherwise reflected in the note, my interpretation is in agreement with the report and use them for decision making whether mentioned or not.  All previous relevant notes including notes and data from other hospitals and prior records were reviewed.  Complex decision making due to multiple acute problems and high risk of adverse outcome due to undiagnosed loss of consciousness.    Thank you for allowing me to participate in the care of Jeannie Linder     NOTE: This report was transcribed using voice recognition software. Every effort was made to ensure accuracy, however, inadvertent computerized transcription errors may be present.

## 2024-02-06 NOTE — PLAN OF CARE
Patient up to unit via bed @ 2000. Oriented to room and call light. A/O x4. Family member at bedside. Vitals and assessments obtained. Oral beds given and fluids started. Pt c/o pain 10/10 bilateral hips and legs. Tylenol 650 mg given @ 2049. Pt in bed resting quietly with call light in reach.    Problem: Discharge Planning  Goal: Discharge to home or other facility with appropriate resources  Outcome: Progressing     Problem: Pain  Goal: Verbalizes/displays adequate comfort level or baseline comfort level  Outcome: Progressing  Flowsheets (Taken 2/5/2024 2030)  Verbalizes/displays adequate comfort level or baseline comfort level:   Encourage patient to monitor pain and request assistance   Assess pain using appropriate pain scale   Administer analgesics based on type and severity of pain and evaluate response   Implement non-pharmacological measures as appropriate and evaluate response     Problem: Skin/Tissue Integrity  Goal: Absence of new skin breakdown  Description: 1.  Monitor for areas of redness and/or skin breakdown  2.  Assess vascular access sites hourly  3.  Every 4-6 hours minimum:  Change oxygen saturation probe site  4.  Every 4-6 hours:  If on nasal continuous positive airway pressure, respiratory therapy assess nares and determine need for appliance change or resting period.  Outcome: Progressing     Problem: Safety - Adult  Goal: Free from fall injury  Outcome: Progressing     Problem: ABCDS Injury Assessment  Goal: Absence of physical injury  Outcome: Progressing     Problem: Cardiovascular - Adult  Goal: Maintains optimal cardiac output and hemodynamic stability  Outcome: Progressing  Goal: Absence of cardiac dysrhythmias or at baseline  Outcome: Progressing     Problem: Skin/Tissue Integrity - Adult  Goal: Skin integrity remains intact  Outcome: Progressing  Goal: Oral mucous membranes remain intact  Outcome: Progressing     Problem: Musculoskeletal - Adult  Goal: Return mobility to safest level  of function  Outcome: Progressing  Goal: Maintain proper alignment of affected body part  Outcome: Progressing  Goal: Return ADL status to a safe level of function  Outcome: Progressing     Problem: Gastrointestinal - Adult  Goal: Maintains adequate nutritional intake  Outcome: Progressing     Problem: Metabolic/Fluid and Electrolytes - Adult  Goal: Electrolytes maintained within normal limits  Outcome: Progressing  Goal: Hemodynamic stability and optimal renal function maintained  Outcome: Progressing     Problem: Hematologic - Adult  Goal: Maintains hematologic stability  Outcome: Progressing

## 2024-02-06 NOTE — PROGRESS NOTES
Shift assessment completed, VSS, scheduled medications given per MAR, propanolol held at this time for heart rate in the 50's, pt is alert and oriented X4, denies any pain, but states it gets bad when she starts moving around. Pt is hard of hearing and has trouble seeing, but does not have her glasses or hearing aids at this time. Pt denies any other questions or concerns at this time. Breaks locked, bed in lowest position and call light within reach.

## 2024-02-06 NOTE — PROGRESS NOTES
23.23 kg/m²     General appearance: No apparent distress, appears stated age and cooperative.  Respiratory:  Normal respiratory effort. Clear to auscultation, bilaterally without Rales/Wheezes/Rhonchi.  Cardiovascular: Regular rate and rhythm with normal S1/S2 without murmurs, rubs or gallops.  Abdomen: Soft, non-tender, non-distended with normal bowel sounds.  Musculoskeletal: No clubbing, cyanosis or edema bilaterally.  Full range of motion without deformity.  Skin: Skin color, texture, turgor normal.  No rashes or lesions.  Neurologic:  Neurovascularly intact without any focal sensory/motor deficits. Cranial nerves: II-XII intact, grossly non-focal.  Psychiatric: Alert and oriented, thought content appropriate, normal insight      Labs:   Recent Labs     02/05/24  1335 02/06/24  0430   WBC 15.8* 13.5*   HGB 13.2 12.2   HCT 39.7 36.3    211     Recent Labs     02/05/24  1335 02/06/24  0430    142   K 3.8 3.5    108   CO2 17* 19*   BUN 55* 53*   CREATININE 1.2 1.1   CALCIUM 9.6 9.0     Recent Labs     02/05/24  1335   AST 75*   ALT 29   BILIDIR <0.2   BILITOT 0.3   ALKPHOS 57     No results for input(s): \"INR\" in the last 72 hours.  Recent Labs     02/05/24  1335 02/05/24  1603 02/05/24 2012 02/06/24  0430   CKTOTAL 1,293*  --   --  1,826*   TROPHS 18* 19* 24*  --        Urinalysis:      Lab Results   Component Value Date/Time    NITRU POSITIVE 02/05/2024 01:35 PM    WBCUA 1 02/05/2024 01:35 PM    BACTERIA None Seen 02/05/2024 01:35 PM    RBCUA 1 02/05/2024 01:35 PM    BLOODU LARGE 02/05/2024 01:35 PM    SPECGRAV 1.025 02/05/2024 01:35 PM    GLUCOSEU Negative 02/05/2024 01:35 PM       Radiology:  CT CERVICAL SPINE WO CONTRAST   Final Result   No acute intracranial abnormality.      Mild to moderate global parenchymal volume loss with chronic microvascular   ischemic disease.      No acute osseous abnormality of the cervical spine.      Degenerative changes of the cervical spine as described  above.         CT HEAD WO CONTRAST   Final Result   No acute intracranial abnormality.      Mild to moderate global parenchymal volume loss with chronic microvascular   ischemic disease.      No acute osseous abnormality of the cervical spine.      Degenerative changes of the cervical spine as described above.         XR HIP 2-3 VW W PELVIS LEFT   Final Result   1. No evidence of fracture at the left hip joint or on rest of AP view of the   pelvis.   2. Osteopenia.         XR CHEST PORTABLE   Final Result   Cardiac enlargement and mild vascular prominence.  Findings are consistent   with failure in the correct clinical setting.         XR SHOULDER LEFT (MIN 2 VIEWS)   Final Result   No evidence of fracture or osseous malalignment at the left shoulder.             IP CONSULT TO CARDIOLOGY  IP CONSULT TO PHYSICAL MEDICINE REHAB      Hany Amezquita MD

## 2024-02-07 LAB
ANION GAP SERPL CALCULATED.3IONS-SCNC: 9 MMOL/L (ref 3–16)
BASOPHILS # BLD: 0 K/UL (ref 0–0.2)
BASOPHILS NFR BLD: 0.3 %
BUN SERPL-MCNC: 30 MG/DL (ref 7–20)
CALCIUM SERPL-MCNC: 8.9 MG/DL (ref 8.3–10.6)
CHLORIDE SERPL-SCNC: 109 MMOL/L (ref 99–110)
CK SERPL-CCNC: 1071 U/L (ref 26–192)
CO2 SERPL-SCNC: 23 MMOL/L (ref 21–32)
CREAT SERPL-MCNC: 0.8 MG/DL (ref 0.6–1.2)
DEPRECATED RDW RBC AUTO: 15.7 % (ref 12.4–15.4)
EOSINOPHIL # BLD: 0.1 K/UL (ref 0–0.6)
EOSINOPHIL NFR BLD: 1.1 %
GFR SERPLBLD CREATININE-BSD FMLA CKD-EPI: >60 ML/MIN/{1.73_M2}
GLUCOSE SERPL-MCNC: 100 MG/DL (ref 70–99)
HCT VFR BLD AUTO: 35.2 % (ref 36–48)
HGB BLD-MCNC: 11.8 G/DL (ref 12–16)
LYMPHOCYTES # BLD: 1.5 K/UL (ref 1–5.1)
LYMPHOCYTES NFR BLD: 19.4 %
MCH RBC QN AUTO: 31.1 PG (ref 26–34)
MCHC RBC AUTO-ENTMCNC: 33.6 G/DL (ref 31–36)
MCV RBC AUTO: 92.4 FL (ref 80–100)
MONOCYTES # BLD: 0.9 K/UL (ref 0–1.3)
MONOCYTES NFR BLD: 11.1 %
NEUTROPHILS # BLD: 5.4 K/UL (ref 1.7–7.7)
NEUTROPHILS NFR BLD: 68.1 %
PLATELET # BLD AUTO: 169 K/UL (ref 135–450)
PMV BLD AUTO: 9.1 FL (ref 5–10.5)
POTASSIUM SERPL-SCNC: 4 MMOL/L (ref 3.5–5.1)
RBC # BLD AUTO: 3.81 M/UL (ref 4–5.2)
SODIUM SERPL-SCNC: 141 MMOL/L (ref 136–145)
WBC # BLD AUTO: 7.9 K/UL (ref 4–11)

## 2024-02-07 PROCEDURE — 93306 TTE W/DOPPLER COMPLETE: CPT

## 2024-02-07 PROCEDURE — 1200000000 HC SEMI PRIVATE

## 2024-02-07 PROCEDURE — 97530 THERAPEUTIC ACTIVITIES: CPT

## 2024-02-07 PROCEDURE — 85025 COMPLETE CBC W/AUTO DIFF WBC: CPT

## 2024-02-07 PROCEDURE — 36415 COLL VENOUS BLD VENIPUNCTURE: CPT

## 2024-02-07 PROCEDURE — 82550 ASSAY OF CK (CPK): CPT

## 2024-02-07 PROCEDURE — 6370000000 HC RX 637 (ALT 250 FOR IP): Performed by: INTERNAL MEDICINE

## 2024-02-07 PROCEDURE — 6370000000 HC RX 637 (ALT 250 FOR IP): Performed by: NURSE PRACTITIONER

## 2024-02-07 PROCEDURE — 80048 BASIC METABOLIC PNL TOTAL CA: CPT

## 2024-02-07 PROCEDURE — 2580000003 HC RX 258: Performed by: STUDENT IN AN ORGANIZED HEALTH CARE EDUCATION/TRAINING PROGRAM

## 2024-02-07 PROCEDURE — 97535 SELF CARE MNGMENT TRAINING: CPT

## 2024-02-07 PROCEDURE — 2580000003 HC RX 258: Performed by: INTERNAL MEDICINE

## 2024-02-07 PROCEDURE — 99232 SBSQ HOSP IP/OBS MODERATE 35: CPT | Performed by: NURSE PRACTITIONER

## 2024-02-07 PROCEDURE — 6360000002 HC RX W HCPCS: Performed by: INTERNAL MEDICINE

## 2024-02-07 RX ORDER — METHOCARBAMOL 500 MG/1
500 TABLET, FILM COATED ORAL ONCE
Status: COMPLETED | OUTPATIENT
Start: 2024-02-07 | End: 2024-02-07

## 2024-02-07 RX ORDER — TRAMADOL HYDROCHLORIDE 50 MG/1
25 TABLET ORAL EVERY 8 HOURS PRN
Status: DISCONTINUED | OUTPATIENT
Start: 2024-02-07 | End: 2024-02-08

## 2024-02-07 RX ORDER — ACETAMINOPHEN 325 MG/1
650 TABLET ORAL EVERY 8 HOURS SCHEDULED
Status: DISCONTINUED | OUTPATIENT
Start: 2024-02-07 | End: 2024-02-08 | Stop reason: HOSPADM

## 2024-02-07 RX ORDER — OXYCODONE HYDROCHLORIDE AND ACETAMINOPHEN 5; 325 MG/1; MG/1
1 TABLET ORAL EVERY 6 HOURS PRN
Status: DISCONTINUED | OUTPATIENT
Start: 2024-02-07 | End: 2024-02-07

## 2024-02-07 RX ADMIN — TRAMADOL HYDROCHLORIDE 25 MG: 50 TABLET ORAL at 21:34

## 2024-02-07 RX ADMIN — METHOCARBAMOL 500 MG: 500 TABLET ORAL at 00:25

## 2024-02-07 RX ADMIN — ENOXAPARIN SODIUM 30 MG: 100 INJECTION SUBCUTANEOUS at 09:08

## 2024-02-07 RX ADMIN — ACETAMINOPHEN 650 MG: 325 TABLET ORAL at 20:42

## 2024-02-07 RX ADMIN — AMLODIPINE BESYLATE 5 MG: 5 TABLET ORAL at 09:08

## 2024-02-07 RX ADMIN — SERTRALINE HYDROCHLORIDE 150 MG: 50 TABLET ORAL at 09:07

## 2024-02-07 RX ADMIN — SODIUM CHLORIDE, POTASSIUM CHLORIDE, SODIUM LACTATE AND CALCIUM CHLORIDE: 600; 310; 30; 20 INJECTION, SOLUTION INTRAVENOUS at 22:25

## 2024-02-07 RX ADMIN — LISINOPRIL 40 MG: 20 TABLET ORAL at 09:07

## 2024-02-07 RX ADMIN — ASPIRIN 81 MG: 81 TABLET, COATED ORAL at 09:08

## 2024-02-07 RX ADMIN — SODIUM CHLORIDE, PRESERVATIVE FREE 10 ML: 5 INJECTION INTRAVENOUS at 09:10

## 2024-02-07 RX ADMIN — ACETAMINOPHEN 650 MG: 325 TABLET ORAL at 14:31

## 2024-02-07 RX ADMIN — PROPRANOLOL HYDROCHLORIDE 40 MG: 20 TABLET ORAL at 09:08

## 2024-02-07 RX ADMIN — SODIUM CHLORIDE, POTASSIUM CHLORIDE, SODIUM LACTATE AND CALCIUM CHLORIDE: 600; 310; 30; 20 INJECTION, SOLUTION INTRAVENOUS at 05:31

## 2024-02-07 RX ADMIN — OXYCODONE AND ACETAMINOPHEN 1 TABLET: 5; 325 TABLET ORAL at 09:07

## 2024-02-07 RX ADMIN — PROPRANOLOL HYDROCHLORIDE 40 MG: 20 TABLET ORAL at 20:42

## 2024-02-07 ASSESSMENT — PAIN DESCRIPTION - DESCRIPTORS: DESCRIPTORS: ACHING

## 2024-02-07 ASSESSMENT — PAIN - FUNCTIONAL ASSESSMENT
PAIN_FUNCTIONAL_ASSESSMENT: ACTIVITIES ARE NOT PREVENTED
PAIN_FUNCTIONAL_ASSESSMENT: ACTIVITIES ARE NOT PREVENTED
PAIN_FUNCTIONAL_ASSESSMENT: PREVENTS OR INTERFERES SOME ACTIVE ACTIVITIES AND ADLS

## 2024-02-07 ASSESSMENT — PAIN DESCRIPTION - ORIENTATION
ORIENTATION: RIGHT;LEFT

## 2024-02-07 ASSESSMENT — PAIN SCALES - WONG BAKER
WONGBAKER_NUMERICALRESPONSE: 0
WONGBAKER_NUMERICALRESPONSE: 2
WONGBAKER_NUMERICALRESPONSE: 0

## 2024-02-07 ASSESSMENT — PAIN SCALES - GENERAL
PAINLEVEL_OUTOF10: 8
PAINLEVEL_OUTOF10: 3
PAINLEVEL_OUTOF10: 7
PAINLEVEL_OUTOF10: 0
PAINLEVEL_OUTOF10: 8
PAINLEVEL_OUTOF10: 3

## 2024-02-07 ASSESSMENT — PAIN DESCRIPTION - LOCATION
LOCATION: HIP
LOCATION: LEG
LOCATION: LEG
LOCATION: HIP

## 2024-02-07 ASSESSMENT — PAIN DESCRIPTION - PAIN TYPE: TYPE: ACUTE PAIN

## 2024-02-07 NOTE — CARE COORDINATION
[facility] received via [facility] Access Portal    Plan Auth ID:    [facility] Auth ID:  [id number]   Service:  SNF  Approval Dates:    02/07/2024-02/09/2024   Next Review Date:  02/09/2024       Electronically signed by CELSA Wheat on 2/7/2024 at 3:31 PM

## 2024-02-07 NOTE — PROGRESS NOTES
Date of Admission: 2/5/2024. Hospital Day: 3     Syncope/Fall:  Unclear etiology, likely 2/2 dehydration.  EKG: NSR with [facility] PACs.  EP consult appreciated: Recommended discharge with 14-day Holter.  Echocardiogram unremarkable.    HTN: Monitor BP on current medications.    Traumatic rhabdomyolysis:  CK levels down trending.  Renal function within normal limits.  Continue IV hydration.      Leukocytosis: Resolved off antibiotics.      Assessment/Plan:    Active Hospital Problems    Diagnosis     Abnormal EKG [R94.31]     Abnormal chest x-ray [R93.89]     Leukocytosis [D72.829]     Elevated CK [R74.8]     Loss of consciousness (HCC) [R40.20]        DVT Prophylaxis: Lovenox  Diet: ADULT DIET; Regular  Code Status: Full Code      Dispo -SNF once medically stable    All follow up labs and imaging personally reviewed      Chief Complaint:   Chief Complaint   Patient presents with    Fall     Pt brought in per [facility] from home, indep living pt was found on the floor in her apt, pt does not recall falling, pt is AAO x 3, pt c/o left hip and knee pain.  Body pain all over.         Interval  History:   Patient seen and examined.   Complaining of chronic bilateral hip pain.  No fever or chills.       Medications:  Reviewed    Infusion Medications    lactated ringers IV soln 100 mL/hr at 02/07/24 1025    sodium chloride       Scheduled Medications    acetaminophen  650 mg Oral 3 times per day    amLODIPine  5 mg Oral Daily    lisinopril  40 mg Oral Daily    propranolol  40 mg Oral BID    sertraline  150 mg Oral Daily    [Held by provider] atorvastatin  20 mg Oral Daily    aspirin  81 mg Oral Daily    sodium chloride flush  5-40 mL IntraVENous 2 times per day    enoxaparin  30 mg SubCUTAneous Daily     PRN Meds: traMADol, perflutren lipid microspheres, sodium chloride flush, sodium chloride, polyethylene glycol, acetaminophen **OR** acetaminophen      Intake/Output Summary (Last 24 hours) at 2/7/2024 1191  Last data  PM    GLUCOSEU Negative 02/05/2024 01:35 PM       Radiology:  CT CERVICAL SPINE WO CONTRAST   Final Result   No acute intracranial abnormality.      Mild to moderate global parenchymal volume loss with chronic microvascular   ischemic disease.      No acute osseous abnormality of the cervical spine.      Degenerative changes of the cervical spine as described above.         CT HEAD WO CONTRAST   Final Result   No acute intracranial abnormality.      Mild to moderate global parenchymal volume loss with chronic microvascular   ischemic disease.      No acute osseous abnormality of the cervical spine.      Degenerative changes of the cervical spine as described above.         XR HIP 2-3 VW W PELVIS LEFT   Final Result   1. No evidence of fracture at the left hip joint or on rest of AP view of the   pelvis.   2. Osteopenia.         XR CHEST PORTABLE   Final Result   Cardiac enlargement and mild vascular prominence.  Findings are consistent   with failure in the correct clinical setting.         XR SHOULDER LEFT (MIN 2 VIEWS)   Final Result   No evidence of fracture or osseous malalignment at the left shoulder.             IP CONSULT TO CARDIOLOGY  IP CONSULT TO PHYSICAL MEDICINE REHAB      Elisabeth Germain MD

## 2024-02-07 NOTE — CARE COORDINATION
Case Management Assessment  Initial Evaluation    Date/Time of Evaluation: 2/7/2024 8:12 AM  Assessment Completed by: CELSA Wheat    If patient is discharged prior to next notation, then this note serves as note for discharge by case management.    Patient Name: Jeannie Linder                   YOB: 1940  Diagnosis: Syncope and collapse [R55]  Elevated CK [R74.8]  Acute UTI [N39.0]  Hypothermia, initial encounter [T68.XXXA]                   Date / Time: 2/5/2024 12:48 PM    Patient Admission Status: Inpatient   Readmission Risk (Low < 19, Mod (19-27), High > 27): Readmission Risk Score: 13.2    Current PCP: Minh Haile, YOSEF - CNP  PCP verified by CM? Yes    Chart Reviewed: Yes      History Provided by: Other (see comment) (The SW spoke with the pt's daughter Quynh via phone.)  Patient Orientation: Other (see comment) (The SW spoke  with the pt's daughter Quynh via phone.)    Patient Cognition: Other (see comment) (The SW spoke with the pt's daughter Quynh via phone.)    Hospitalization in the last 30 days (Readmission):  No    If yes, Readmission Assessment in  Navigator will be completed.    Advance Directives:      Code Status: Full Code   Patient's Primary Decision Maker is: Legal Next of Kin    Primary Decision Maker: Quynh Montalvo - Child - [phone]    Secondary Decision Maker: Jacky Beaversssica - Child - [phone]    Supplemental (Other) Decision Maker: Jamil Montalvo - Other - [phone]    Discharge Planning:    Patient lives with: Alone Type of Home: Independent Living (The pt [facility] at [facility])  Primary Care Giver: Self  Patient Support Systems include: Children, Family Members, Friends/Neighbors   Current Financial resources: Medicaid, Medicare  Current community resources: None  Current services prior to admission: Durable Medical Equipment, Emergency Call  System            Current DME: Walker            Type of Home Care services:  None    ADLS  Prior functional

## 2024-02-07 NOTE — PROGRESS NOTES
Patient is complaining of 9/10 pain in her hips/upper thighs. Messaged Dayana Ocampo NP, she has ordered Robaxin to be given once to see if it helps.    room air

## 2024-02-07 NOTE — PROGRESS NOTES
Daughter called this morning. She said that someone from [facility] will be here to talk about placement for patient. She does live on the independent living and they know she may need a little help. The family is in [place] right now for a graduation.

## 2024-02-07 NOTE — CARE COORDINATION
Currently patient's functional status is depA for bed mobility, transfers, and ambulation. Additionally, patient lacks medical complexity for an ARU admission.   Would recommended a milder rehab course at  SNF. CM and Primary team updated.    Dr. Dorantes updated.    ARU will continue to follow progress and update discharge plan as needed.    MANI HillsN, .[phone]

## 2024-02-07 NOTE — PROGRESS NOTES
[facility]   Electrophysiology Progress Note     Date: 2/7/2024  Admit Date: 2/5/2024     Reason for consultation: Loss of consciousness    Chief Complaint:   Chief Complaint   Patient presents with    Fall     Pt brought in per [facility] from home, indep living pt was found on the floor in her apt, pt does not recall falling, pt is AAO x 3, pt c/o left hip and knee pain.  Body pain all over.       History of Present Illness: History obtained from patient and medical record.     Jeannie Linder is a 83 y.o. female with a past medical history of  hypertension, hyperlipidemia, asthma, reported history of stroke was brought to the emergency room after she was found down on the floor of her room.  She is not sure how long she has been down.  She does not remember falling. 2 days ago she had severe nausea and vomiting but no diarrhea.    Interval Hx: Today, she is being seen for follow up. She is working with therapy this morning. She remains in sinus rhythm with frequent PACs. Her BP is fairly stable. Her echo has not been completed yet.    Patient seen and examined. Clinical notes reviewed. Telemetry reviewed.  No new complaints today. No major events overnight.   Denies having chest pain, palpitations, shortness of breath, orthopnea/PND, cough, or dizziness at the time of this visit.    Allergies:  Allergies   Allergen Reactions    Levofloxacin Anxiety    Doxycycline      Red rash     Penicillins Swelling     Home Meds:  Prior to Visit Medications    Medication Sig Taking? Authorizing Provider   hydroCHLOROthiazide 12.5 MG tablet TAKE 1 TABLET BY MOUTH EVERY DAY  Patient taking differently: Take 1 tablet by mouth daily  Kimberlee Roberto, YOSEF - CNP   simvastatin (ZOCOR) 40 MG tablet Take 1 tablet by mouth nightly  Minh Haile, APRN - CNP   lisinopril (PRINIVIL;ZESTRIL) 40 MG tablet TAKE 1 TABLET BY MOUTH EVERY DAY  Patient taking differently: Take 1 tablet by mouth daily TAKE 1 TABLET BY MOUTH  57 2024 01:35 PM    PROT 7.4 2024 01:35 PM    AGRATIO 1.4 2023 10:27 AM    BILITOT 0.3 2024 01:35 PM     Cardiac Enzymes:   Lab Results   Component Value Date/Time    CKTOTAL 1,071 2024 06:00 AM     Coags: No results found for: \"PROTIME\", \"INR\"    EC24  Unable to differentiate due to artifact    ECHO: 2019   Normal left ventricle size, wall thickness, and systolic function with an   estimated ejection fraction of 55-60%.   No regional wall motion abnormalities are seen.   Mild tricuspid regurgitation, RVSP is estimated at 40 mmHg.     Stress Test: 2019   Normal myocardial perfusion study. Normal LV size and systolic function.    CXR: 24  Cardiac enlargement and mild vascular prominence.  Findings are consistent  with failure in the correct clinical setting.    Problem List:   Patient Active Problem List    Diagnosis Date Noted    Chronic renal disease, stage III ([facility]) [468505] 2022    Abnormal EKG 2024    Abnormal chest x-ray 2024    Leukocytosis 2024    Elevated CK 2024    Current moderate episode of major depressive disorder without prior episode ([facility]) 2020    Lumbar stenosis with neurogenic claudication 2020    Closed displaced fracture of shaft of right clavicle 2019    Syncope, vasovagal 2019    Depression 2019    Loss of consciousness ([facility]) 2019    Urge incontinence 2016    Primary insomnia 12/15/2015    Hyperlipidemia 2014    Rectal bleeding 2013    Osteoporosis 2013    Essential hypertension 2013    Cervical spine degeneration 2013    Essential tremor 2013    Tobacco dependence 2013        Assessment and Plan:     1.LOC ?   - Etiology unknown   - Echo pending   - Will do monitor at d/c (likely 14 day holter as I doubt she can comprehend 30 day event monitor at her age)    2.HTN  - Stable  ~ Goal <140/80  - Continue current medications    3. Abnormal

## 2024-02-07 NOTE — PROGRESS NOTES
Shift assessment completed. VSS, scheduled meds given/held per MAR orders. Pt is A&Ox4. Pt has complaints of pain, she does not have an PRN's and she says they have been giving her tylenol for the pain, giving that. No other needs at this time. Brakes locked, bed in lowest position, bed alarm engaged. All belongings and call light within reach.

## 2024-02-07 NOTE — PLAN OF CARE
Problem: Discharge Planning  Goal: Discharge to home or other facility with appropriate resources  2/6/2024 2221 by Olga Mcbride RN  Outcome: Progressing     Problem: Pain  Goal: Verbalizes/displays adequate comfort level or baseline comfort level  2/6/2024 2221 by Olga Mcbride RN  Outcome: Progressing     Problem: Safety - Adult  Goal: Free from fall injury  2/6/2024 2221 by Olga Mcbride RN  Outcome: Progressing  Flowsheets (Taken 2/6/2024 0843 by Ryan Simon, RN)  Free From Fall Injury:   Instruct family/caregiver on patient safety   Based on caregiver fall risk screen, instruct family/caregiver to ask for assistance with transferring infant if caregiver noted to have fall risk factors     Problem: ABCDS Injury Assessment  Goal: Absence of physical injury  2/6/2024 2221 by Olga Mcbride RN  Outcome: Progressing     Problem: Skin/Tissue Integrity - Adult  Goal: Skin integrity remains intact  2/6/2024 2221 by Olga Mcbride RN  Outcome: Progressing     Problem: Skin/Tissue Integrity - Adult  Goal: Oral mucous membranes remain intact  2/6/2024 2221 by Olga Mcbride RN  Outcome: Progressing     Problem: Gastrointestinal - Adult  Goal: Maintains adequate nutritional intake  2/6/2024 2221 by Olga Mcbride RN  Outcome: Progressing     Problem: Chronic Conditions and Co-morbidities  Goal: Patient's chronic conditions and co-morbidity symptoms are monitored and maintained or improved  Outcome: Progressing

## 2024-02-07 NOTE — PROGRESS NOTES
accurately follows one step commands-- with increased time      Education  Barriers To Learning: cognition and hearing  Patient Education: patient educated on goals, OT role and benefits, plan of care, ADL adaptive strategies, disease specific education, transfer training, discharge recommendations  Learning Assessment:  patient will require reinforcement due to cognitive deficits    Assessment  Activity Tolerance: Fair tolerance  Impairments Requiring Therapeutic Intervention: decreased functional mobility, decreased ADL status, decreased endurance, decreased balance, increased pain  Prognosis: good  Clinical Assessment: Patient is not at baseline level following being found down in apartment for an unknown period of time. Patient was indep with ADLs and walks with 4WW [place]. Patient requiring modA of 2 for transfer with RW and max-total A for LB ADLs. Patient is not safet to return home. Demonstrating improvement in participation this date. Patient would benefit from skilled OT therapy with goal of returning back to [place]  Safety Interventions: patient left in chair, chair alarm in place, call light within reach, patient at risk for falls, and nurse notified    Plan  Frequency: 3-5 x/per week  Current Treatment Recommendations: balance training, functional mobility training, transfer training, endurance training, patient/caregiver education, ADL/self-care training, cognitive/perceptual training, cognitive reorientation, pain management, and equipment evaluation/education    Goals  Patient Goals: get stronger, return to [place]    Short Term Goals:  Time Frame: until discharge  Patient will complete lower body ADL at moderate assistance   Patient will complete toileting at moderate assistance   Patient will complete functional transfers at moderate assistance   Patient will complete functional mobility at moderate assistance   Patient will increase [facility] ADL score = to or > than 14/24    Above  goals reviewed on 2/7/2024.  All goals are ongoing at this time unless indicated above.       Therapy Session Time     Individual Group Co-treatment   Time In    1002   Time Out    1041   Minutes    39        Timed Code Treatment Minutes:  Timed Code Treatment Minutes: 39 Minutes  Total Treatment Minutes:  39 minutes        Electronically Signed By: GINETTE Mazariegos MOT OTR/L [id number]

## 2024-02-07 NOTE — PROGRESS NOTES
[facility] - Inpatient Rehabilitation Department   Phone: [phone]    Physical Therapy    [] Initial Evaluation            [x] Daily Treatment Note         [] Discharge Summary      Patient: Jeannie Linder   : 1940   MRN: [id number]   Date of Service:  2024  Admitting Diagnosis: Elevated CK  Current Admission Summary: per EMR \"Jeannie Linder is a 83 y.o. female who was brought in for EMS as patient was found down on the floor of her apartment lives in independent living unsure how long she was down for patient states 2 days ago was having severe nausea and vomiting no diarrhea at the time fevers or chills patient does not number fall it is unsure how long she was down for is currently awake alert oriented x 3 denies any chest pain shortness breath fever chills nausea vomiting does complain of slight left hip discomfort \"  Update : Aox3 (disoriented to time), short term memory deficit    Past Medical History:  has a past medical history of Asthma, Breast cyst, Essential tremor, HTN (hypertension), Hyperlipidemia, Medical history reviewed with no changes, Ovarian cyst, Pneumonia, and Stroke (HCC).  Past Surgical History:  has a past surgical history that includes  section; Hysterectomy (); Inner ear surgery (); Cataract removal (); Pain management procedure (Right, 2020); Pain management procedure (Right, 2020); Pain management procedure (Bilateral, 2020); Pain management procedure (Bilateral, 2020); and Pain management procedure (Bilateral, 2020).    Discharge Recommendations: Jeannie Linder scored a 8/24 on the AM-PAC short mobility form. Current research shows that an AM-PAC score of 17 or less is typically not associated with a discharge to the patient's home setting. Based on the patient's AM-PAC score and their current functional mobility deficits, it is recommended that the patient have 5-7 sessions per week of Physical Therapy at  maintain balance  Dynamic Sitting Balance: poor (-): requires max (A) to maintain balance  Static Standing Balance: poor (-): requires max (A) to maintain balance  Dynamic Standing Balance: poor (-): requires max (A) to maintain balance  Comments: Sitting balance EOB variable from CGA to max A (loss of balance posteriorly due to pain); pt also with difficulty correcting posterior lean needing max A    Other Therapeutic Interventions  Prior to mobility, PT performed PROM of B hip and knees (into flexion) x 10 reps each.   Seated EOB pt performed HR/TR x 10 and assisted LAQ x 5 B (limited by pain).    Pt sat EOB to wash her face and comb hair. Required back support (leaning onto therapist) briefly to rest and reduce back pain.  After transfer to chair pt brushed her teeth.  Pt able to utilize UEs on arm rest to assist with upright sitting (with mod A).      Functional Outcomes  -PAC Inpatient Mobility Raw Score : 8              Cognition  Overall Cognitive Status: WFL  Arousal/Alertness: delayed responses to stimuli  Following Commands: follows one step commands with repetition, follows one step commands with increased time  Memory: decreased recall of recent events, decreased short term memory  Initiation: requires cues for some        Orientation:    oriented to person, oriented to place, oriented to situation, and disoriented to time --initially was oriented to place but later during session though she was at [place] facility   Command Following:   accurately follows one step commands    Education  Barriers To Learning: cognition  Patient Education: patient educated on goals, PT role and benefits, plan of care, general safety, functional mobility training, proper use of assistive device/equipment, orientation, transfer training, discharge recommendations  Learning Assessment:  patient will require reinforcement due to cognitive deficits    Assessment  Activity Tolerance: Fair  Impairments Requiring Therapeutic

## 2024-02-08 VITALS
SYSTOLIC BLOOD PRESSURE: 124 MMHG | TEMPERATURE: 98.3 F | BODY MASS INDEX: 24.14 KG/M2 | RESPIRATION RATE: 15 BRPM | DIASTOLIC BLOOD PRESSURE: 63 MMHG | HEIGHT: 62 IN | OXYGEN SATURATION: 94 % | HEART RATE: 72 BPM | WEIGHT: 131.2 LBS

## 2024-02-08 DIAGNOSIS — R55 SYNCOPE, VASOVAGAL: Primary | ICD-10-CM

## 2024-02-08 PROBLEM — G93.41 ACUTE METABOLIC ENCEPHALOPATHY: Status: ACTIVE | Noted: 2024-02-08

## 2024-02-08 PROBLEM — E87.6 HYPOKALEMIA: Status: ACTIVE | Noted: 2024-02-08

## 2024-02-08 PROBLEM — E55.9 VITAMIN D DEFICIENCY: Status: ACTIVE | Noted: 2024-02-08

## 2024-02-08 PROBLEM — M25.551 CHRONIC PAIN OF BOTH HIPS: Status: ACTIVE | Noted: 2024-02-08

## 2024-02-08 PROBLEM — E83.42 HYPOMAGNESEMIA: Status: ACTIVE | Noted: 2024-02-08

## 2024-02-08 PROBLEM — M25.552 CHRONIC PAIN OF BOTH HIPS: Status: ACTIVE | Noted: 2024-02-08

## 2024-02-08 PROBLEM — G89.29 CHRONIC PAIN OF BOTH HIPS: Status: ACTIVE | Noted: 2024-02-08

## 2024-02-08 LAB
25(OH)D3 SERPL-MCNC: 10.3 NG/ML
ANION GAP SERPL CALCULATED.3IONS-SCNC: 8 MMOL/L (ref 3–16)
BASOPHILS # BLD: 0 K/UL (ref 0–0.2)
BASOPHILS NFR BLD: 0.4 %
BUN SERPL-MCNC: 20 MG/DL (ref 7–20)
CALCIUM SERPL-MCNC: 8.2 MG/DL (ref 8.3–10.6)
CHLORIDE SERPL-SCNC: 103 MMOL/L (ref 99–110)
CK SERPL-CCNC: 475 U/L (ref 26–192)
CO2 SERPL-SCNC: 25 MMOL/L (ref 21–32)
CREAT SERPL-MCNC: 0.6 MG/DL (ref 0.6–1.2)
DEPRECATED RDW RBC AUTO: 15.4 % (ref 12.4–15.4)
EOSINOPHIL # BLD: 0.3 K/UL (ref 0–0.6)
EOSINOPHIL NFR BLD: 4.6 %
FERRITIN SERPL IA-MCNC: 233.2 NG/ML (ref 15–150)
FOLATE SERPL-MCNC: 5.28 NG/ML (ref 4.78–24.2)
GFR SERPLBLD CREATININE-BSD FMLA CKD-EPI: >60 ML/MIN/{1.73_M2}
GLUCOSE SERPL-MCNC: 100 MG/DL (ref 70–99)
HCT VFR BLD AUTO: 31.6 % (ref 36–48)
HGB BLD-MCNC: 10.6 G/DL (ref 12–16)
IRON SATN MFR SERPL: 25 % (ref 15–50)
IRON SERPL-MCNC: 48 UG/DL (ref 37–145)
LYMPHOCYTES # BLD: 1.6 K/UL (ref 1–5.1)
LYMPHOCYTES NFR BLD: 23.4 %
MAGNESIUM SERPL-MCNC: 1.7 MG/DL (ref 1.8–2.4)
MCH RBC QN AUTO: 31 PG (ref 26–34)
MCHC RBC AUTO-ENTMCNC: 33.6 G/DL (ref 31–36)
MCV RBC AUTO: 92.2 FL (ref 80–100)
MONOCYTES # BLD: 0.7 K/UL (ref 0–1.3)
MONOCYTES NFR BLD: 9.8 %
NEUTROPHILS # BLD: 4.1 K/UL (ref 1.7–7.7)
NEUTROPHILS NFR BLD: 61.8 %
PLATELET # BLD AUTO: 151 K/UL (ref 135–450)
PMV BLD AUTO: 9.2 FL (ref 5–10.5)
POTASSIUM SERPL-SCNC: 2.8 MMOL/L (ref 3.5–5.1)
POTASSIUM SERPL-SCNC: 3.7 MMOL/L (ref 3.5–5.1)
RBC # BLD AUTO: 3.43 M/UL (ref 4–5.2)
SODIUM SERPL-SCNC: 136 MMOL/L (ref 136–145)
TIBC SERPL-MCNC: 189 UG/DL (ref 260–445)
TSH SERPL DL<=0.005 MIU/L-ACNC: 2.34 UIU/ML (ref 0.27–4.2)
VIT B12 SERPL-MCNC: >2000 PG/ML (ref 211–911)
WBC # BLD AUTO: 6.7 K/UL (ref 4–11)

## 2024-02-08 PROCEDURE — 6370000000 HC RX 637 (ALT 250 FOR IP): Performed by: INTERNAL MEDICINE

## 2024-02-08 PROCEDURE — 84443 ASSAY THYROID STIM HORMONE: CPT

## 2024-02-08 PROCEDURE — 6360000002 HC RX W HCPCS: Performed by: INTERNAL MEDICINE

## 2024-02-08 PROCEDURE — 85025 COMPLETE CBC W/AUTO DIFF WBC: CPT

## 2024-02-08 PROCEDURE — 83540 ASSAY OF IRON: CPT

## 2024-02-08 PROCEDURE — 82550 ASSAY OF CK (CPK): CPT

## 2024-02-08 PROCEDURE — 97530 THERAPEUTIC ACTIVITIES: CPT

## 2024-02-08 PROCEDURE — 82306 VITAMIN D 25 HYDROXY: CPT

## 2024-02-08 PROCEDURE — 84132 ASSAY OF SERUM POTASSIUM: CPT

## 2024-02-08 PROCEDURE — 83735 ASSAY OF MAGNESIUM: CPT

## 2024-02-08 PROCEDURE — 97535 SELF CARE MNGMENT TRAINING: CPT

## 2024-02-08 PROCEDURE — 36415 COLL VENOUS BLD VENIPUNCTURE: CPT

## 2024-02-08 PROCEDURE — 82728 ASSAY OF FERRITIN: CPT

## 2024-02-08 PROCEDURE — 83550 IRON BINDING TEST: CPT

## 2024-02-08 PROCEDURE — 82746 ASSAY OF FOLIC ACID SERUM: CPT

## 2024-02-08 PROCEDURE — 80048 BASIC METABOLIC PNL TOTAL CA: CPT

## 2024-02-08 PROCEDURE — 82607 VITAMIN B-12: CPT

## 2024-02-08 RX ORDER — LANOLIN ALCOHOL/MO/W.PET/CERES
400 CREAM (GRAM) TOPICAL 2 TIMES DAILY
Status: DISCONTINUED | OUTPATIENT
Start: 2024-02-08 | End: 2024-02-08 | Stop reason: HOSPADM

## 2024-02-08 RX ORDER — POTASSIUM CHLORIDE 7.45 MG/ML
10 INJECTION INTRAVENOUS PRN
Status: DISCONTINUED | OUTPATIENT
Start: 2024-02-08 | End: 2024-02-08 | Stop reason: HOSPADM

## 2024-02-08 RX ORDER — ERGOCALCIFEROL 1.25 MG/1
50000 CAPSULE ORAL WEEKLY
Status: DISCONTINUED | OUTPATIENT
Start: 2024-02-08 | End: 2024-02-08 | Stop reason: HOSPADM

## 2024-02-08 RX ORDER — POTASSIUM CHLORIDE 20 MEQ/1
40 TABLET, EXTENDED RELEASE ORAL 2 TIMES DAILY WITH MEALS
Status: COMPLETED | OUTPATIENT
Start: 2024-02-08 | End: 2024-02-08

## 2024-02-08 RX ORDER — POTASSIUM CHLORIDE 750 MG/1
10 TABLET, EXTENDED RELEASE ORAL DAILY
Qty: 5 TABLET | Refills: 0
Start: 2024-02-08 | End: 2024-02-13

## 2024-02-08 RX ORDER — MAGNESIUM SULFATE IN WATER 40 MG/ML
2000 INJECTION, SOLUTION INTRAVENOUS ONCE
Status: DISCONTINUED | OUTPATIENT
Start: 2024-02-08 | End: 2024-02-08

## 2024-02-08 RX ORDER — LANOLIN ALCOHOL/MO/W.PET/CERES
400 CREAM (GRAM) TOPICAL DAILY
Qty: 5 TABLET | Refills: 0
Start: 2024-02-08 | End: 2024-02-13

## 2024-02-08 RX ORDER — POTASSIUM CHLORIDE 20 MEQ/1
40 TABLET, EXTENDED RELEASE ORAL PRN
Status: DISCONTINUED | OUTPATIENT
Start: 2024-02-08 | End: 2024-02-08 | Stop reason: HOSPADM

## 2024-02-08 RX ORDER — ERGOCALCIFEROL 1.25 MG/1
50000 CAPSULE ORAL WEEKLY
Qty: 5 CAPSULE | Refills: 0
Start: 2024-02-15 | End: 2024-03-08

## 2024-02-08 RX ORDER — POTASSIUM CHLORIDE 20 MEQ/1
20 TABLET, EXTENDED RELEASE ORAL 2 TIMES DAILY WITH MEALS
Status: DISCONTINUED | OUTPATIENT
Start: 2024-02-08 | End: 2024-02-08

## 2024-02-08 RX ADMIN — ERGOCALCIFEROL 50000 UNITS: 1.25 CAPSULE ORAL at 14:29

## 2024-02-08 RX ADMIN — ENOXAPARIN SODIUM 30 MG: 100 INJECTION SUBCUTANEOUS at 09:30

## 2024-02-08 RX ADMIN — AMLODIPINE BESYLATE 5 MG: 5 TABLET ORAL at 09:30

## 2024-02-08 RX ADMIN — ACETAMINOPHEN 650 MG: 325 TABLET ORAL at 05:13

## 2024-02-08 RX ADMIN — SERTRALINE HYDROCHLORIDE 150 MG: 50 TABLET ORAL at 09:30

## 2024-02-08 RX ADMIN — PROPRANOLOL HYDROCHLORIDE 40 MG: 20 TABLET ORAL at 09:30

## 2024-02-08 RX ADMIN — ASPIRIN 81 MG: 81 TABLET, COATED ORAL at 09:30

## 2024-02-08 RX ADMIN — MAGNESIUM SULFATE HEPTAHYDRATE 2000 MG: 40 INJECTION, SOLUTION INTRAVENOUS at 08:03

## 2024-02-08 RX ADMIN — Medication 400 MG: at 09:30

## 2024-02-08 RX ADMIN — POTASSIUM CHLORIDE 10 MEQ: 7.46 INJECTION, SOLUTION INTRAVENOUS at 06:28

## 2024-02-08 RX ADMIN — POTASSIUM CHLORIDE 40 MEQ: 1500 TABLET, EXTENDED RELEASE ORAL at 09:31

## 2024-02-08 RX ADMIN — ACETAMINOPHEN 650 MG: 325 TABLET ORAL at 14:29

## 2024-02-08 ASSESSMENT — PAIN DESCRIPTION - ORIENTATION: ORIENTATION: RIGHT;LEFT

## 2024-02-08 ASSESSMENT — PAIN SCALES - GENERAL
PAINLEVEL_OUTOF10: 2
PAINLEVEL_OUTOF10: 10

## 2024-02-08 ASSESSMENT — PAIN DESCRIPTION - LOCATION: LOCATION: LEG

## 2024-02-08 NOTE — DISCHARGE SUMMARY
V2.0  Discharge Summary    Name:  Jeannie Linder /Age/Sex: 1940 (83 y.o. female)   Admit Date: 2024  Discharge Date: 24    MRN & CSN:  [id number] & [id number] Encounter Date and Time 24 4:03 PM EST    Attending:  Elisabeth Germain MD Discharging Provider: Elisabeth Germain MD       Hospital Course:     Brief HPI: Jeannie Linder is a 83 y.o. female with PMHx of HTN, HLD, asthma, CVA, bilateral hip osteoarthritis, brought in after she was found down on the floor of her room. She lives in an independent living facility.  Patient did not remember falling and was not sure how long she had been down but   Stated that 2 days prior to presentation, she was having nausea and vomiting without diarrhea, fever or chills.      Suspected syncope/Fall:  Unclear etiology, likely 2/2 dehydration.  CT head without intracranial abnormality.    CT C-spine with no acute abnormality but degenerative changes.  EKG: NSR with freq PACs.  EP consult appreciated: Recommended discharge with 14-day Holter.  Echocardiogram unremarkable.       Chronic bilateral hip pain: Continue pain control with Tylenol.    Acute encephalopathy: Likely due to narcotics.  Unable to confirm underlying dementia.    Percocet discontinued.    HTN: BP stable on propranolol and amlodipine.  Lisinopril and HCTZ discontinued.       Traumatic rhabdomyolysis:  CK levels down trending; 1826---> 4675 with IV hydration.  Renal function within normal limits.  Lipitor discontinued until resolves.    Hypokalemia/hypomagnesemia: Replaced.  Will need monitoring outpatient.    Leukocytosis: Resolved off antibiotics.    The patient expressed appropriate understanding of, and agreement with the discharge recommendations, medications, and plan.     Consults this admission:  IP CONSULT TO CARDIOLOGY  IP CONSULT TO PHYSICAL MEDICINE REHAB    Discharge Diagnosis:   Elevated CK      Discharge Instruction:   Follow up appointments: PCP  Primary care physician:  CONTRAST    Result Date: 2/5/2024  EXAMINATION: CT OF THE HEAD WITHOUT CONTRAST; CT OF THE CERVICAL SPINE WITHOUT CONTRAST 2/5/2024 1:27 pm; 2/5/2024 1:28 pm TECHNIQUE: CT of the head was performed without the administration of intravenous contrast. Automated exposure control, iterative reconstruction, and/or weight based adjustment of the mA/kV was utilized to reduce the radiation dose to as low as reasonably achievable.; CT of the cervical spine was performed without the administration of intravenous contrast. Multiplanar reformatted images are provided for review. Automated exposure control, iterative reconstruction, and/or weight based adjustment of the mA/kV was utilized to reduce the radiation dose to as low as reasonably achievable. COMPARISON: September 26, 2020 HISTORY: ORDERING SYSTEM PROVIDED HISTORY: fall, found down TECHNOLOGIST PROVIDED HISTORY: Reason for exam:->fall, found down Has a \"code stroke\" or \"stroke alert\" been called?->No Decision Support Exception - unselect if not a suspected or confirmed emergency medical condition->Emergency Medical Condition (MA) Reason for Exam: fall, found down; ORDERING SYSTEM PROVIDED HISTORY: fall, found  down TECHNOLOGIST PROVIDED HISTORY: Reason for exam:->fall, found  down Decision Support Exception - unselect if not a suspected or confirmed emergency medical condition->Emergency Medical Condition (MA) Reason for Exam: fall, found down FINDINGS: BRAIN/VENTRICLES: There is no acute intracranial hemorrhage, mass effect or midline shift.  No abnormal extra-axial fluid collection.  The gray-white differentiation is maintained without evidence of an acute infarct.  There is no evidence of hydrocephalus. Mild to moderate global parenchymal volume loss.  Low-attenuation areas in the subcortical and deep white matter of the brain related to chronic microvascular ischemic disease. ORBITS: The visualized portion of the orbits demonstrate no acute abnormality. SINUSES:

## 2024-02-08 NOTE — PROGRESS NOTES
Received a call from lab with a critical K of 2.8. We do not have PRN orderes in place. Messaged Dr. Nevarez at [phone].

## 2024-02-08 NOTE — CARE COORDINATION
Case Management -  Discharge Note      Patient Name: Jeannie Linder                   YOB: 1940            Readmission Risk (Low < 19, Mod (19-27), High > 27): Readmission Risk Score: 15.5    Current PCP: Minh Haile APRN - CNP    ([facility]) Important Message from Medicare:    Date: 2/8/2022    PT AM-PAC: 8 /24  OT AM-PAC: 13 /24    Patient/patient representative has been educated on the benefits of skilled nursing facility  as well as the possible risks of declining recommended services. Patient/patient representative has acknowledged the information provided and decided on the following discharge plan. Patient/ patient representative has been provided freedom of choice regarding service provider, supported by basic dialogue that supports the patient's individualized plan of care/goals.    Patient noted to have a discharge order.  Pt has been medically cleared for transition to Skilled Nursing Rehab Facility    Patient discharged to   [facility]  [address] [place] [address]  Phone: [phone]  Fax: [phone]       HENS Completed:  Yes  Pre-cert required/obtained:  Yes    Transportation scheduled for 6:00pm  Transportation provided by [employer] [phone]   AVS faxed and agency notified:  Yes  The following prescriptions sent with pt:   Family Notified:  Yes    Nurse to call report to facility    Financial    Payor: [facility] MEDICARE / Plan: [facility] MEDICARE COMPLETE / Product Type: *No Product type* /         Electronically signed by CELSA Wheat on 2/8/2024 at 3:34 PM

## 2024-02-08 NOTE — DISCHARGE INSTR - COC
Continuity of Care Form    Patient Name: Jeannie Linder   :  1940  MRN:  [id number]    Admit date:  2024  Discharge date:  24    Code Status Order: Full Code   Advance Directives:     Admitting Physician:  Luis Antonio Leal MD  PCP: Minh Haile, APRN - CNP    Discharging Nurse: DILLON Souht  Discharging Hospital Unit/Room#: 3AN-3301/3301-01  Discharging Unit Phone Number: [phone]    Emergency Contact:   Extended Emergency Contact Information  Primary Emergency Contact: Quynh Montalvo   [facility]  Home Phone: [phone]  Work Phone: [phone]  Relation: Child  Secondary Emergency Contact: Cynthia Beavers  Home Phone: [phone]  Relation: Child    Past Surgical History:  Past Surgical History:   Procedure Laterality Date    CATARACT REMOVAL       SECTION      HYSTERECTOMY (CERVIX STATUS UNKNOWN)  1981    INNER EAR SURGERY      PAIN MANAGEMENT PROCEDURE Right 2020    RIGHT L1 AND L2 TRANSFORAMINAL EPIDURAL STEROID INJECTION WITH FLUOROSCOPY (93922, 63337) performed by Angelika Knott MD at [facility]    PAIN MANAGEMENT PROCEDURE Right 2020    RIGHT L1 AND L2 TRANSFORAMINAL EPIDURAL STEROID INJECTION WITH FLUOROSCOPY (88425, 50226) performed by Angelika Knott MD at [facility]    PAIN MANAGEMENT PROCEDURE Bilateral 2020    BILATERAL L1, L2, L3 MEDIAL BRANCH BLOCK WITH FLUOROSCOPY (20897, 48881)  #1 performed by Angelika Knott MD at [facility]    PAIN MANAGEMENT PROCEDURE Bilateral 2020    BILATERAL L1 ,L2,L3 MEDIAL BRANCH BLOCKS WITH FLUOROSCOPY performed by Angelika Knott MD at [facility]    PAIN MANAGEMENT PROCEDURE Bilateral 2020    BILATERAL L1, L2, L3 RADIOFREQUENCY ABLATION WITH FLUOROSCOPY performed by Angelika Knott MD at [facility]       Immunization History:   Immunization History   Administered Date(s) Administered    COVID-19, MODERNA BLUE border, Primary or Immunocompromised, (age 12y+), IM, 100 mcg/0.5mL 2021, 2021

## 2024-02-08 NOTE — PROGRESS NOTES
[facility] - Inpatient Rehabilitation Department   Phone: [phone]    Occupational Therapy    [] Initial Evaluation            [x] Daily Treatment Note         [] Discharge Summary      Patient: Jeannie Linder   : 1940   MRN: [id number]   Date of Service:  2024    Admitting Diagnosis:  Elevated CK  Current Admission Summary:   Jeannie Linder is a 83 y.o. female who was brought in for EMS as patient was found down on the floor of her apartment lives in independent living unsure how long she was down for patient states 2 days ago was having severe nausea and vomiting no diarrhea at the time fevers or chills patient does not number fall it is unsure how long she was down for is currently awake alert oriented x 3 denies any chest pain shortness breath fever chills nausea vomiting does complain of slight left hip discomfort  Past Medical History:  has a past medical history of Asthma, Breast cyst, Essential tremor, HTN (hypertension), Hyperlipidemia, Medical history reviewed with no changes, Ovarian cyst, Pneumonia, and Stroke (HCC).  Past Surgical History:  has a past surgical history that includes  section; Hysterectomy (); Inner ear surgery (); Cataract removal (); Pain management procedure (Right, 2020); Pain management procedure (Right, 2020); Pain management procedure (Bilateral, 2020); Pain management procedure (Bilateral, 2020); and Pain management procedure (Bilateral, 2020).    Discharge Recommendations: Jeannie Linder scored a 13/24 on the AM-PAC ADL Inpatient form. Current research shows that an AM-PAC score of 17 or less is typically not associated with a discharge to the patient's home setting. Based on the patient's AM-PAC score and their current ADL deficits, it is recommended that the patient have 5-7 sessions per week of Occupational Therapy at d/c to increase the patient's independence.  At this time, this patient demonstrates

## 2024-02-08 NOTE — CARE COORDINATION
02/08/24 1600   IMM Letter   IMM Letter given to Patient/Family/Significant other/Guardian/POA/by: IMM given to the patient. The patient and family are agreeable to discharge.   IMM Letter date given: 02/08/24   IMM Letter time given: 1600

## 2024-02-08 NOTE — PROGRESS NOTES
Shift assessment completed. VSS, scheduled meds given/held per MAR orders. Pt is A&Ox4 but confused at times. Pt has complaints of pain in her hips only at a 3-4 right now. No other needs at this time. Brakes locked, bed in lowest position, bed alarm engaged. All belongings and call light within reach.

## 2024-02-08 NOTE — PROGRESS NOTES
[facility] - Inpatient Rehabilitation Department   Phone: [phone]    Physical Therapy    [] Initial Evaluation            [x] Daily Treatment Note         [] Discharge Summary      Patient: Jeannie Linder   : 1940   MRN: [id number]   Date of Service:  2024  Admitting Diagnosis: Elevated CK  Current Admission Summary: per EMR \"Jeannie Linder is a 83 y.o. female who was brought in for EMS as patient was found down on the floor of her apartment lives in independent living unsure how long she was down for patient states 2 days ago was having severe nausea and vomiting no diarrhea at the time fevers or chills patient does not number fall it is unsure how long she was down for is currently awake alert oriented x 3 denies any chest pain shortness breath fever chills nausea vomiting does complain of slight left hip discomfort \"  Update : Aox3 (disoriented to time), short term memory deficit    Past Medical History:  has a past medical history of Asthma, Breast cyst, Essential tremor, HTN (hypertension), Hyperlipidemia, Medical history reviewed with no changes, Ovarian cyst, Pneumonia, and Stroke (HCC).  Past Surgical History:  has a past surgical history that includes  section; Hysterectomy (); Inner ear surgery (); Cataract removal (); Pain management procedure (Right, 2020); Pain management procedure (Right, 2020); Pain management procedure (Bilateral, 2020); Pain management procedure (Bilateral, 2020); and Pain management procedure (Bilateral, 2020).    Discharge Recommendations: Jeannie Linder scored a 8/24 on the AM-PAC short mobility form. Current research shows that an AM-PAC score of 17 or less is typically not associated with a discharge to the patient's home setting. Based on the patient's AM-PAC score and their current functional mobility deficits, it is recommended that the patient have 5-7 sessions per week of Physical Therapy at

## 2024-02-08 NOTE — PLAN OF CARE
Problem: Discharge Planning  Goal: Discharge to home or other facility with appropriate resources  Outcome: Progressing     Problem: Pain  Goal: Verbalizes/displays adequate comfort level or baseline comfort level  Outcome: Progressing     Problem: Skin/Tissue Integrity  Goal: Absence of new skin breakdown  Description: 1.  Monitor for areas of redness and/or skin breakdown  2.  Assess vascular access sites hourly  3.  Every 4-6 hours minimum:  Change oxygen saturation probe site  4.  Every 4-6 hours:  If on nasal continuous positive airway pressure, respiratory therapy assess nares and determine need for appliance change or resting period.  Outcome: Progressing     Problem: Safety - Adult  Goal: Free from fall injury  Outcome: Progressing     Problem: ABCDS Injury Assessment  Goal: Absence of physical injury  Outcome: Progressing     Problem: Hematologic - Adult  Goal: Maintains hematologic stability  Outcome: Progressing

## 2024-02-09 ENCOUNTER — TELEPHONE (OUTPATIENT)
Dept: INTERNAL MEDICINE CLINIC | Age: 84
End: 2024-02-09

## 2024-02-09 LAB
BACTERIA BLD CULT ORG #2: NORMAL
BACTERIA BLD CULT: NORMAL

## 2024-02-09 NOTE — TELEPHONE ENCOUNTER
Care Transitions Initial Follow Up Call    Outreach made within 2 business days of discharge: Yes    Patient: Jeannie Linder Patient : 1940   MRN: [id number]  Reason for Admission: There are no discharge diagnoses documented for the most recent discharge.  Discharge Date: 24       Spoke with: daughter    Discharge department/facility: [facility]    TCM Interactive Patient Contact:  Was patient able to fill all prescriptions: No: Patient discharged to Rehab facility  Was patient instructed to bring all medications to the follow-up visit: No: Patient discharged to Rehab Facility  Is patient taking all medications as directed in the discharge summary? Patient discharged to [facility]  Does patient understand their discharge instructions: No: Patient discharged to Rehab facility  Does patient have questions or concerns that need addressed prior to 7-14 day follow up office visit: no. Patient discharged to Rehab Facility    Scheduled appointment with PCP within 7-14 days    Call to schedule Hospital follow up appointment, spoke with daughter. She stated that she could hardly walk so the patient was discharged to [facility] [place].     Follow Up  Future Appointments   Date Time Provider Department Center   2024 11:00 AM SCHEDULE, [facility]  [facility]   2024  1:00 PM Minh Haile, APRN - CNP [facility]  Magdalena Mederos MA

## 2024-03-05 ENCOUNTER — TELEPHONE (OUTPATIENT)
Dept: CARDIOLOGY CLINIC | Age: 84
End: 2024-03-05

## 2024-03-05 NOTE — TELEPHONE ENCOUNTER
Called and spoke with patient's daughter Quynh and informed her of message below. Appointment scheduled for May 2nd at 14:00

## 2024-03-05 NOTE — TELEPHONE ENCOUNTER
----- Message from YOSEF Colon CNP sent at 3/5/2024  2:35 PM EST -----  Please arrange follow up with Dr. Hernandez or myself in a few months to discuss monitor

## 2024-03-06 ENCOUNTER — TELEPHONE (OUTPATIENT)
Dept: INTERNAL MEDICINE CLINIC | Age: 84
End: 2024-03-06

## 2024-03-06 NOTE — TELEPHONE ENCOUNTER
Tasia Epperson from [facility] is calling---did cyndi molina for swallowing---she recommends upgrading pt to regular consistency diet and thin liquids---not recommending any additional speech therapy at this time----will be sending over report on this to be signed. Any questions she can be reached aat [phone].  Thanks.

## 2024-05-01 PROBLEM — I49.1 PAC (PREMATURE ATRIAL CONTRACTION): Status: ACTIVE | Noted: 2024-05-01

## 2024-05-01 PROBLEM — I47.19 PAT (PAROXYSMAL ATRIAL TACHYCARDIA) (HCC): Status: ACTIVE | Noted: 2024-05-01

## 2024-05-01 NOTE — PROGRESS NOTES
[facility]   Electrophysiology follow up  Date: 5/2/2024    CC: PAC   HPI: Jeannie Linder is a 83 y.o. patient with a past medical history significant for hypertension, hyperlipidemia, LOC, and asthma.     She was admitted 2/8/24 after being found on the floor in her room at her independent living facility. Her acute work up was unremarkable and evaluated by cardiology who recommended a 14 day monitor and echocardiogram. Her echo 2/7/24 showed normal LVEF 55-60% with no significant valvular abnormalities.     2/12/2024 She wore a monitor that revealed NSR, symptoms with PAC, frequent short PAT, and 25% PAC burden.     Presented to [facility] 4/8/2024 with Pre-syncope. Improved symptoms with IV fluids    Interval History: Jeannie Linder presents today to discuss her monitor results and hospital follow up. She continues to have dizziness and episodes are generally resolved with fluids. She has no other cardiac complaints.     Assessment and plan:   PAC's/PAT   -ECG today shows Ectopic atrial rhythm    -2 Week holter, 2/12/2024, showed frequent short PAT and 25% PAC burden    -TSH 2.530 4/9/2024  Frequent episodes of short runs of SVT.  Patient is asymptomatic.  Given the history of bradycardia and hypotension, I do not think there is need for beta-blocker or calcium channel blocker at this point.  We can continue to monitor clinically.  LOC/dizziness   -Symptoms improved with IV fluids    -Has ear issues which are also contributing to her symptoms as she has increased dizziness when she is lying in bed   -Dizziness does not appear to be cardiac related   -Recommend she see ENT  Symptoms of loss of consciousness likely due to dehydration and hypotension.  Adequate hydration and nutrition is recommended.    Dizziness is clearly related to other causes since it happens with her movement even when lying in bed.  Evaluation by ENT and neurology is recommended.      HTN  -Controlled 130/58  -BP goal <130/80  -Home 
  Neurological: Alert and oriented. Cranial nerve appears intact, No Gross deficit   Skin: Skin is warm and dry. No rash noted.   Psychiatric: Has a normal behavior       Review of System:  [x] Full ROS obtained and negative except as mentioned in HPI    Prior to Admission medications    Medication Sig Start Date End Date Taking? Authorizing Provider   magnesium oxide (MAG-OX) 400 (240 Mg) MG tablet Take 1 tablet by mouth daily for 5 days 24  Elisabeth Germain MD   Ergocalciferol (VITAMIN D) 68709 units CAPS Take 50,000 Units by mouth once a week for 4 doses 2/15/24 3/8/24  Elisabeth Germain MD   potassium chloride (KLOR-CON M) 10 MEQ extended release tablet Take 1 tablet by mouth daily for 5 days 24  Elisabeth Germain MD   amLODIPine (NORVASC) 5 MG tablet TAKE 1 TABLET BY MOUTH EVERY DAY  Patient taking differently: Take 1 tablet by mouth daily 23   Minh Haile APRN - CNP   fexofenadine (ALLEGRA) 180 MG tablet Take 1 tablet by mouth daily as needed (Seasonal allergies.) As needed    ProviderTaylor MD   sertraline (ZOLOFT) 100 MG tablet TAKE 1.5 TABLETS BY MOUTH EVERY DAY  Patient taking differently: Take 1.5 tablets by mouth daily 23   Minh Haile APRN - CNP   propranolol (INDERAL) 40 MG tablet Take 1 tablet by mouth 2 times daily 23   Minh Haile APRN - CNP   aspirin 81 MG EC tablet Take 1 tablet by mouth daily    Provider, MD Taylor       Past Medical History:   Diagnosis Date    Asthma     Breast cyst     Essential tremor     HTN (hypertension)     Hyperlipidemia     Medical history reviewed with no changes     Ovarian cyst     Pneumonia     Stroke (HCC)     mini        Past Surgical History:   Procedure Laterality Date    CATARACT REMOVAL  2012     SECTION      HYSTERECTOMY (CERVIX STATUS UNKNOWN)  1981    INNER EAR SURGERY  2007    PAIN MANAGEMENT PROCEDURE Right 2020    RIGHT L1 AND L2 TRANSFORAMINAL EPIDURAL STEROID 
(0) Absent

## 2024-05-02 ENCOUNTER — OFFICE VISIT (OUTPATIENT)
Dept: CARDIOLOGY CLINIC | Age: 84
End: 2024-05-02
Payer: MEDICARE

## 2024-05-02 VITALS
SYSTOLIC BLOOD PRESSURE: 130 MMHG | HEART RATE: 78 BPM | WEIGHT: 122.5 LBS | DIASTOLIC BLOOD PRESSURE: 58 MMHG | BODY MASS INDEX: 22.54 KG/M2 | OXYGEN SATURATION: 92 % | HEIGHT: 62 IN

## 2024-05-02 DIAGNOSIS — I49.1 PAC (PREMATURE ATRIAL CONTRACTION): ICD-10-CM

## 2024-05-02 DIAGNOSIS — R55 SYNCOPE, VASOVAGAL: ICD-10-CM

## 2024-05-02 DIAGNOSIS — I10 ESSENTIAL HYPERTENSION: ICD-10-CM

## 2024-05-02 DIAGNOSIS — I47.19 PAT (PAROXYSMAL ATRIAL TACHYCARDIA) (HCC): Primary | ICD-10-CM

## 2024-05-02 PROCEDURE — 1090F PRES/ABSN URINE INCON ASSESS: CPT | Performed by: INTERNAL MEDICINE

## 2024-05-02 PROCEDURE — G8399 PT W/DXA RESULTS DOCUMENT: HCPCS | Performed by: INTERNAL MEDICINE

## 2024-05-02 PROCEDURE — 3078F DIAST BP <80 MM HG: CPT | Performed by: INTERNAL MEDICINE

## 2024-05-02 PROCEDURE — 3075F SYST BP GE 130 - 139MM HG: CPT | Performed by: INTERNAL MEDICINE

## 2024-05-02 PROCEDURE — 99214 OFFICE O/P EST MOD 30 MIN: CPT | Performed by: INTERNAL MEDICINE

## 2024-05-02 PROCEDURE — G8428 CUR MEDS NOT DOCUMENT: HCPCS | Performed by: INTERNAL MEDICINE

## 2024-05-02 PROCEDURE — G8420 CALC BMI NORM PARAMETERS: HCPCS | Performed by: INTERNAL MEDICINE

## 2024-05-02 PROCEDURE — 1123F ACP DISCUSS/DSCN MKR DOCD: CPT | Performed by: INTERNAL MEDICINE

## 2024-05-02 PROCEDURE — 4004F PT TOBACCO SCREEN RCVD TLK: CPT | Performed by: INTERNAL MEDICINE

## 2024-05-02 PROCEDURE — 93000 ELECTROCARDIOGRAM COMPLETE: CPT | Performed by: INTERNAL MEDICINE

## 2024-05-02 RX ORDER — ATORVASTATIN CALCIUM 20 MG/1
20 TABLET, FILM COATED ORAL DAILY
COMMUNITY
Start: 2024-03-02

## 2024-05-14 ENCOUNTER — OFFICE VISIT (OUTPATIENT)
Dept: ENT CLINIC | Age: 84
End: 2024-05-14
Payer: MEDICARE

## 2024-05-14 VITALS
SYSTOLIC BLOOD PRESSURE: 180 MMHG | OXYGEN SATURATION: 96 % | BODY MASS INDEX: 22.45 KG/M2 | HEIGHT: 62 IN | HEART RATE: 62 BPM | TEMPERATURE: 97.3 F | WEIGHT: 122 LBS | DIASTOLIC BLOOD PRESSURE: 84 MMHG

## 2024-05-14 DIAGNOSIS — H90.5 CONGENITAL HEARING LOSS OF RIGHT EAR: ICD-10-CM

## 2024-05-14 DIAGNOSIS — H81.12 BENIGN PAROXYSMAL POSITIONAL VERTIGO OF LEFT EAR: Primary | ICD-10-CM

## 2024-05-14 DIAGNOSIS — H91.92 HEARING LOSS OF LEFT EAR, UNSPECIFIED HEARING LOSS TYPE: ICD-10-CM

## 2024-05-14 PROCEDURE — 1090F PRES/ABSN URINE INCON ASSESS: CPT | Performed by: STUDENT IN AN ORGANIZED HEALTH CARE EDUCATION/TRAINING PROGRAM

## 2024-05-14 PROCEDURE — 1123F ACP DISCUSS/DSCN MKR DOCD: CPT | Performed by: STUDENT IN AN ORGANIZED HEALTH CARE EDUCATION/TRAINING PROGRAM

## 2024-05-14 PROCEDURE — G8427 DOCREV CUR MEDS BY ELIG CLIN: HCPCS | Performed by: STUDENT IN AN ORGANIZED HEALTH CARE EDUCATION/TRAINING PROGRAM

## 2024-05-14 PROCEDURE — 4004F PT TOBACCO SCREEN RCVD TLK: CPT | Performed by: STUDENT IN AN ORGANIZED HEALTH CARE EDUCATION/TRAINING PROGRAM

## 2024-05-14 PROCEDURE — 3077F SYST BP >= 140 MM HG: CPT | Performed by: STUDENT IN AN ORGANIZED HEALTH CARE EDUCATION/TRAINING PROGRAM

## 2024-05-14 PROCEDURE — G8399 PT W/DXA RESULTS DOCUMENT: HCPCS | Performed by: STUDENT IN AN ORGANIZED HEALTH CARE EDUCATION/TRAINING PROGRAM

## 2024-05-14 PROCEDURE — 3078F DIAST BP <80 MM HG: CPT | Performed by: STUDENT IN AN ORGANIZED HEALTH CARE EDUCATION/TRAINING PROGRAM

## 2024-05-14 PROCEDURE — G8420 CALC BMI NORM PARAMETERS: HCPCS | Performed by: STUDENT IN AN ORGANIZED HEALTH CARE EDUCATION/TRAINING PROGRAM

## 2024-05-14 PROCEDURE — 99213 OFFICE O/P EST LOW 20 MIN: CPT | Performed by: STUDENT IN AN ORGANIZED HEALTH CARE EDUCATION/TRAINING PROGRAM

## 2024-05-14 RX ORDER — OMEPRAZOLE 20 MG/1
20 CAPSULE, DELAYED RELEASE ORAL DAILY
COMMUNITY

## 2024-05-14 NOTE — PROGRESS NOTES
[facility]  DIVISION OF OTOLARYNGOLOGY- HEAD & NECK SURGERY  NEW PATIENT HISTORY AND PHYSICAL NOTE      Patient Name: Jeannie Linder  Medical Record Number:  [id number]  Primary Care Physician:  Minh Haile, YOSEF - CNP    ChiefComplaint     Chief Complaint   Patient presents with    Dizziness     dizziness       History of Present Illness     Jeannie Linder is an 83 y.o. female presenting with dizziness provoked by turning over in bed and getting up from lying or seated position.  Oftentimes will have to lie back down and her symptoms will get better.  No history of similar issues in the past.  This started a few months ago.  She falls frequently, approximately 3 times a week, but denies any recent significant head trauma.  No associated nausea or vomiting.  No recent hearing changes.  Denies tinnitus.  She has a history of congenital right-sided deafness, had right ear surgery 15 years ago where they \"removed her inner ear\" but is unsure exactly why they did this.  She wears a left-sided hearing aid.      Past Medical History     Past Medical History:   Diagnosis Date    Asthma     Breast cyst     Essential tremor     HTN (hypertension)     Hyperlipidemia     Medical history reviewed with no changes     Ovarian cyst     Pneumonia     Stroke (HCC)     mini       Past Surgical History     Past Surgical History:   Procedure Laterality Date    CATARACT REMOVAL       SECTION      HYSTERECTOMY (CERVIX STATUS UNKNOWN)  1981    INNER EAR SURGERY      PAIN MANAGEMENT PROCEDURE Right 2020    RIGHT L1 AND L2 TRANSFORAMINAL EPIDURAL STEROID INJECTION WITH FLUOROSCOPY (97875, 42490) performed by Angelika Knott MD at [facility]    PAIN MANAGEMENT PROCEDURE Right 2020    RIGHT L1 AND L2 TRANSFORAMINAL EPIDURAL STEROID INJECTION WITH FLUOROSCOPY (46348, 40014) performed by Angelika Knott MD at [facility]    PAIN MANAGEMENT PROCEDURE Bilateral 2020    BILATERAL L1, L2, L3 MEDIAL

## 2024-06-07 ENCOUNTER — HOSPITAL ENCOUNTER (OUTPATIENT)
Dept: PHYSICAL THERAPY | Age: 84
Setting detail: THERAPIES SERIES
Discharge: HOME | End: 2024-06-07
Payer: MEDICARE

## 2024-06-07 DIAGNOSIS — H81.12 BPPV (BENIGN PAROXYSMAL POSITIONAL VERTIGO), LEFT: ICD-10-CM

## 2024-06-07 DIAGNOSIS — Z74.09 IMPAIRED FUNCTIONAL MOBILITY, BALANCE, GAIT, AND ENDURANCE: Primary | ICD-10-CM

## 2024-06-07 PROCEDURE — 97535 SELF CARE MNGMENT TRAINING: CPT

## 2024-06-07 PROCEDURE — 97162 PT EVAL MOD COMPLEX 30 MIN: CPT

## 2024-06-07 PROCEDURE — 95992 CANALITH REPOSITIONING PROC: CPT

## 2024-06-07 NOTE — PLAN OF CARE
[facility] - Outpatient Rehabilitation and Therapy [address], [place] [address] office: [phone] fax: [phone]     Physical Therapy Initial Evaluation Certification      Dear Joon Buckley DO,    We had the pleasure of evaluating the following patient for physical therapy services at [facility] Outpatient Physical Therapy.  A summary of our findings can be found in the initial assessment below.  This includes our plan of care.  If you have any questions or concerns regarding these findings, please do not hesitate to contact me at the office phone number listed above.  Thank you for the referral.     Physician Signature:_______________________________Date:__________________  By signing above (or electronic signature), therapist’s plan is approved by physician       Physical Therapy: TREATMENT/PROGRESS NOTE   Patient: Jeannie Linder (83 y.o. female)   Examination Date: 2024   :  1940 MRN: [id number]   Visit #: 1   Insurance Allowable Auth Needed   % covered []Yes    [x]No    Insurance: Payor: [facility] MEDICARE / Plan: [facility] MEDICARE COMPLETE / Product Type: *No Product type* /   Insurance ID: [id number] - (Medicare Managed)  Secondary Insurance (if applicable):    Treatment Diagnosis:     ICD-10-CM    1. Impaired functional mobility, balance, gait, and endurance  Z74.09       2. BPPV (benign paroxysmal positional vertigo), left  H81.12          Medical Diagnosis:  Benign paroxysmal positional vertigo of left ear [H81.12]   Referring Physician: Joon Buckley DO  PCP: Minh Haile, APRN - CNP       Plan of care signed (Y/N): N    Date of Patient follow up with Physician:      Progress Report/POC: EVAL today  POC update due: (10 visits /OR AUTH LIMITS, whichever is less)  2024                                             Precautions/ Contra-indications:           Latex allergy:  NO  Pacemaker:    NO  Contraindications for Manipulation: NA  Date of

## 2024-06-20 ENCOUNTER — APPOINTMENT (OUTPATIENT)
Dept: PHYSICAL THERAPY | Age: 84
End: 2024-06-20
Payer: MEDICARE

## 2024-06-24 ENCOUNTER — APPOINTMENT (OUTPATIENT)
Dept: PHYSICAL THERAPY | Age: 84
End: 2024-06-24
Payer: MEDICARE

## 2024-06-27 ENCOUNTER — APPOINTMENT (OUTPATIENT)
Dept: PHYSICAL THERAPY | Age: 84
End: 2024-06-27
Payer: MEDICARE

## (undated) DEVICE — 3M™ TEGADERM™ +PAD FILM DRESSING WITH NON-ADHERENT PAD, 3588, 6 IN X 6 IN (15 CM X 15 CM), 25/CAR, 4 CAR/CS: Brand: 3M™ TEGADERM™

## (undated) DEVICE — CHLORAPREP 26ML ORANGE

## (undated) DEVICE — UNIVERSAL BLOCK TRAY: Brand: AVANOS*

## (undated) DEVICE — PEN: MARKING STD 100/CS: Brand: MEDICAL ACTION INDUSTRIES

## (undated) DEVICE — STERILE POLYISOPRENE POWDER-FREE SURGICAL GLOVES: Brand: PROTEXIS

## (undated) DEVICE — MEDIA CONTRAST RX ISOVUE-300 61% 30ML VIALS

## (undated) DEVICE — STANDARD HYPODERMIC NEEDLE,POLYPROPYLENE HUB: Brand: MONOJECT

## (undated) DEVICE — NEEDLE SPNL 22GA L3.5IN BLK HUB S STL REG WALL FIT STYL W/

## (undated) DEVICE — DISPOSABLE OR TOWEL: Brand: CARDINAL HEALTH

## (undated) DEVICE — SYRINGE MED 10ML LUERLOCK TIP W/O SFTY DISP

## (undated) DEVICE — TOWEL,OR,DSP,ST,BLUE,STD,4/PK,20PK/CS: Brand: MEDLINE

## (undated) DEVICE — HYPODERMIC SAFETY NEEDLE: Brand: MAGELLAN

## (undated) DEVICE — STERILE DISPOSABLE EQUIPMENT COVER - DOME COVER 22" DEPTH: Brand: PREFERRED MEDICAL PRODUCTS, LLC

## (undated) DEVICE — PORT VLV 2 W NDL FREE SMRTSITE

## (undated) DEVICE — SET EXTN L7IN PRIMING VOL 0.5ML PRSS RATE STD BOR 1 REM

## (undated) DEVICE — SYRINGE MED 3ML CLR PLAS STD N CTRL LUERLOCK TIP DISP

## (undated) DEVICE — Z CONVERTED USE 2275871 SPONGE GZ W4XL4IN WHT 8 PLY CURITY

## (undated) DEVICE — SHEET,DRAPE,53X77,STERILE: Brand: MEDLINE

## (undated) DEVICE — Device: Brand: JELCO

## (undated) DEVICE — NEEDLE RF 20GA L10CM TIP L10MM CVD ACT TIP SL

## (undated) DEVICE — PAD GRND NEUT ELECTRD AD DISP